# Patient Record
Sex: MALE | Race: WHITE | NOT HISPANIC OR LATINO
[De-identification: names, ages, dates, MRNs, and addresses within clinical notes are randomized per-mention and may not be internally consistent; named-entity substitution may affect disease eponyms.]

---

## 2022-01-01 ENCOUNTER — RESULT REVIEW (OUTPATIENT)
Age: 87
End: 2022-01-01

## 2022-01-01 ENCOUNTER — APPOINTMENT (OUTPATIENT)
Dept: HEMATOLOGY ONCOLOGY | Facility: CLINIC | Age: 87
End: 2022-01-01

## 2022-01-01 ENCOUNTER — OUTPATIENT (OUTPATIENT)
Dept: OUTPATIENT SERVICES | Facility: HOSPITAL | Age: 87
LOS: 1 days | Discharge: HOME | End: 2022-01-01
Payer: MEDICARE

## 2022-01-01 ENCOUNTER — TRANSCRIPTION ENCOUNTER (OUTPATIENT)
Age: 87
End: 2022-01-01

## 2022-01-01 ENCOUNTER — NON-APPOINTMENT (OUTPATIENT)
Age: 87
End: 2022-01-01

## 2022-01-01 ENCOUNTER — APPOINTMENT (OUTPATIENT)
Dept: CARDIOLOGY | Facility: CLINIC | Age: 87
End: 2022-01-01

## 2022-01-01 ENCOUNTER — APPOINTMENT (OUTPATIENT)
Age: 87
End: 2022-01-01
Payer: MEDICARE

## 2022-01-01 ENCOUNTER — APPOINTMENT (OUTPATIENT)
Age: 87
End: 2022-01-01

## 2022-01-01 ENCOUNTER — INPATIENT (INPATIENT)
Facility: HOSPITAL | Age: 87
LOS: 8 days | End: 2022-10-01
Attending: INTERNAL MEDICINE | Admitting: INTERNAL MEDICINE

## 2022-01-01 ENCOUNTER — APPOINTMENT (OUTPATIENT)
Dept: VASCULAR SURGERY | Facility: CLINIC | Age: 87
End: 2022-01-01
Payer: MEDICARE

## 2022-01-01 ENCOUNTER — OUTPATIENT (OUTPATIENT)
Dept: OUTPATIENT SERVICES | Facility: HOSPITAL | Age: 87
LOS: 1 days | Discharge: HOME | End: 2022-01-01

## 2022-01-01 ENCOUNTER — LABORATORY RESULT (OUTPATIENT)
Age: 87
End: 2022-01-01

## 2022-01-01 ENCOUNTER — APPOINTMENT (OUTPATIENT)
Dept: RADIATION ONCOLOGY | Facility: HOSPITAL | Age: 87
End: 2022-01-01

## 2022-01-01 ENCOUNTER — INPATIENT (INPATIENT)
Facility: HOSPITAL | Age: 87
LOS: 4 days | Discharge: HOME HEALTH | End: 2022-05-07
Attending: INTERNAL MEDICINE | Admitting: INTERNAL MEDICINE
Payer: MEDICARE

## 2022-01-01 ENCOUNTER — INPATIENT (INPATIENT)
Facility: HOSPITAL | Age: 87
LOS: 2 days | Discharge: HOME | End: 2022-06-23
Attending: STUDENT IN AN ORGANIZED HEALTH CARE EDUCATION/TRAINING PROGRAM | Admitting: STUDENT IN AN ORGANIZED HEALTH CARE EDUCATION/TRAINING PROGRAM
Payer: MEDICARE

## 2022-01-01 ENCOUNTER — OUTPATIENT (OUTPATIENT)
Dept: OUTPATIENT SERVICES | Facility: HOSPITAL | Age: 87
LOS: 1 days | End: 2022-01-01

## 2022-01-01 ENCOUNTER — EMERGENCY (EMERGENCY)
Facility: HOSPITAL | Age: 87
LOS: 0 days | Discharge: HOME | End: 2022-06-14
Attending: EMERGENCY MEDICINE | Admitting: EMERGENCY MEDICINE
Payer: MEDICARE

## 2022-01-01 VITALS — SYSTOLIC BLOOD PRESSURE: 88 MMHG | DIASTOLIC BLOOD PRESSURE: 54 MMHG

## 2022-01-01 VITALS
DIASTOLIC BLOOD PRESSURE: 57 MMHG | RESPIRATION RATE: 26 BRPM | HEIGHT: 67 IN | HEART RATE: 127 BPM | OXYGEN SATURATION: 97 % | TEMPERATURE: 98 F | SYSTOLIC BLOOD PRESSURE: 101 MMHG

## 2022-01-01 VITALS
RESPIRATION RATE: 16 BRPM | SYSTOLIC BLOOD PRESSURE: 97 MMHG | HEART RATE: 110 BPM | WEIGHT: 161.38 LBS | OXYGEN SATURATION: 94 % | DIASTOLIC BLOOD PRESSURE: 69 MMHG | TEMPERATURE: 97.3 F | BODY MASS INDEX: 25.28 KG/M2

## 2022-01-01 VITALS
HEART RATE: 96 BPM | RESPIRATION RATE: 20 BRPM | OXYGEN SATURATION: 98 % | SYSTOLIC BLOOD PRESSURE: 127 MMHG | DIASTOLIC BLOOD PRESSURE: 53 MMHG

## 2022-01-01 VITALS
HEART RATE: 107 BPM | SYSTOLIC BLOOD PRESSURE: 120 MMHG | WEIGHT: 155.21 LBS | DIASTOLIC BLOOD PRESSURE: 53 MMHG | TEMPERATURE: 96 F

## 2022-01-01 VITALS
RESPIRATION RATE: 14 BRPM | BODY MASS INDEX: 25.11 KG/M2 | SYSTOLIC BLOOD PRESSURE: 116 MMHG | WEIGHT: 160 LBS | HEIGHT: 67 IN | HEART RATE: 64 BPM | DIASTOLIC BLOOD PRESSURE: 80 MMHG | OXYGEN SATURATION: 92 %

## 2022-01-01 VITALS
OXYGEN SATURATION: 97 % | SYSTOLIC BLOOD PRESSURE: 120 MMHG | HEIGHT: 67 IN | HEART RATE: 80 BPM | BODY MASS INDEX: 26.21 KG/M2 | DIASTOLIC BLOOD PRESSURE: 80 MMHG | WEIGHT: 167 LBS | RESPIRATION RATE: 14 BRPM

## 2022-01-01 VITALS
HEART RATE: 85 BPM | WEIGHT: 160.06 LBS | TEMPERATURE: 100 F | RESPIRATION RATE: 20 BRPM | DIASTOLIC BLOOD PRESSURE: 75 MMHG | SYSTOLIC BLOOD PRESSURE: 158 MMHG

## 2022-01-01 VITALS
WEIGHT: 168 LBS | BODY MASS INDEX: 32.98 KG/M2 | HEART RATE: 88 BPM | DIASTOLIC BLOOD PRESSURE: 62 MMHG | SYSTOLIC BLOOD PRESSURE: 102 MMHG | RESPIRATION RATE: 14 BRPM | HEIGHT: 60 IN | OXYGEN SATURATION: 98 %

## 2022-01-01 VITALS
HEART RATE: 72 BPM | BODY MASS INDEX: 27.31 KG/M2 | HEIGHT: 67 IN | SYSTOLIC BLOOD PRESSURE: 118 MMHG | WEIGHT: 174 LBS | TEMPERATURE: 97.6 F | DIASTOLIC BLOOD PRESSURE: 68 MMHG

## 2022-01-01 VITALS
RESPIRATION RATE: 12 BRPM | DIASTOLIC BLOOD PRESSURE: 60 MMHG | SYSTOLIC BLOOD PRESSURE: 100 MMHG | WEIGHT: 170 LBS | OXYGEN SATURATION: 97 % | HEART RATE: 79 BPM

## 2022-01-01 VITALS
DIASTOLIC BLOOD PRESSURE: 67 MMHG | OXYGEN SATURATION: 97 % | TEMPERATURE: 98 F | HEART RATE: 89 BPM | SYSTOLIC BLOOD PRESSURE: 105 MMHG | WEIGHT: 164.91 LBS | HEIGHT: 67 IN | RESPIRATION RATE: 15 BRPM

## 2022-01-01 VITALS
HEART RATE: 107 BPM | WEIGHT: 164 LBS | BODY MASS INDEX: 25.69 KG/M2 | SYSTOLIC BLOOD PRESSURE: 99 MMHG | RESPIRATION RATE: 16 BRPM | OXYGEN SATURATION: 95 % | DIASTOLIC BLOOD PRESSURE: 55 MMHG | TEMPERATURE: 97 F

## 2022-01-01 VITALS
WEIGHT: 161 LBS | HEART RATE: 100 BPM | OXYGEN SATURATION: 95 % | DIASTOLIC BLOOD PRESSURE: 56 MMHG | TEMPERATURE: 96.6 F | RESPIRATION RATE: 16 BRPM | SYSTOLIC BLOOD PRESSURE: 116 MMHG | BODY MASS INDEX: 25.22 KG/M2

## 2022-01-01 VITALS
HEART RATE: 101 BPM | RESPIRATION RATE: 20 BRPM | SYSTOLIC BLOOD PRESSURE: 104 MMHG | DIASTOLIC BLOOD PRESSURE: 63 MMHG | WEIGHT: 160.06 LBS | TEMPERATURE: 97 F | HEIGHT: 67 IN

## 2022-01-01 VITALS
RESPIRATION RATE: 14 BRPM | HEIGHT: 67 IN | WEIGHT: 161 LBS | DIASTOLIC BLOOD PRESSURE: 80 MMHG | HEART RATE: 85 BPM | OXYGEN SATURATION: 90 % | BODY MASS INDEX: 25.27 KG/M2 | SYSTOLIC BLOOD PRESSURE: 130 MMHG

## 2022-01-01 VITALS
RESPIRATION RATE: 20 BRPM | HEART RATE: 78 BPM | SYSTOLIC BLOOD PRESSURE: 104 MMHG | DIASTOLIC BLOOD PRESSURE: 53 MMHG | OXYGEN SATURATION: 98 % | TEMPERATURE: 97 F | HEIGHT: 67 IN

## 2022-01-01 VITALS
TEMPERATURE: 98 F | RESPIRATION RATE: 18 BRPM | HEART RATE: 93 BPM | DIASTOLIC BLOOD PRESSURE: 65 MMHG | SYSTOLIC BLOOD PRESSURE: 146 MMHG | OXYGEN SATURATION: 99 %

## 2022-01-01 VITALS
DIASTOLIC BLOOD PRESSURE: 60 MMHG | BODY MASS INDEX: 27.31 KG/M2 | OXYGEN SATURATION: 95 % | SYSTOLIC BLOOD PRESSURE: 90 MMHG | WEIGHT: 174 LBS | HEART RATE: 78 BPM | TEMPERATURE: 98.2 F | HEIGHT: 67 IN

## 2022-01-01 VITALS — HEART RATE: 115 BPM

## 2022-01-01 VITALS — TEMPERATURE: 98 F

## 2022-01-01 VITALS — OXYGEN SATURATION: 98 %

## 2022-01-01 VITALS — BODY MASS INDEX: 26.68 KG/M2 | WEIGHT: 170 LBS | HEIGHT: 67 IN

## 2022-01-01 VITALS — DIASTOLIC BLOOD PRESSURE: 55 MMHG | SYSTOLIC BLOOD PRESSURE: 82 MMHG

## 2022-01-01 DIAGNOSIS — Z02.9 ENCOUNTER FOR ADMINISTRATIVE EXAMINATIONS, UNSPECIFIED: ICD-10-CM

## 2022-01-01 DIAGNOSIS — I73.9 PERIPHERAL VASCULAR DISEASE, UNSPECIFIED: ICD-10-CM

## 2022-01-01 DIAGNOSIS — Z87.891 PERSONAL HISTORY OF NICOTINE DEPENDENCE: ICD-10-CM

## 2022-01-01 DIAGNOSIS — Z80.1 FAMILY HISTORY OF MALIGNANT NEOPLASM OF TRACHEA, BRONCHUS AND LUNG: ICD-10-CM

## 2022-01-01 DIAGNOSIS — E78.00 PURE HYPERCHOLESTEROLEMIA, UNSPECIFIED: ICD-10-CM

## 2022-01-01 DIAGNOSIS — Z98.890 OTHER SPECIFIED POSTPROCEDURAL STATES: Chronic | ICD-10-CM

## 2022-01-01 DIAGNOSIS — Z98.49 CATARACT EXTRACTION STATUS, UNSPECIFIED EYE: Chronic | ICD-10-CM

## 2022-01-01 DIAGNOSIS — Z09 ENCOUNTER FOR FOLLOW-UP EXAMINATION AFTER COMPLETED TREATMENT FOR CONDITIONS OTHER THAN MALIGNANT NEOPLASM: ICD-10-CM

## 2022-01-01 DIAGNOSIS — R73.03 PREDIABETES: ICD-10-CM

## 2022-01-01 DIAGNOSIS — R91.8 OTHER NONSPECIFIC ABNORMAL FINDING OF LUNG FIELD: ICD-10-CM

## 2022-01-01 DIAGNOSIS — E78.5 HYPERLIPIDEMIA, UNSPECIFIED: ICD-10-CM

## 2022-01-01 DIAGNOSIS — I10 ESSENTIAL (PRIMARY) HYPERTENSION: ICD-10-CM

## 2022-01-01 DIAGNOSIS — R42 DIZZINESS AND GIDDINESS: ICD-10-CM

## 2022-01-01 DIAGNOSIS — I48.91 UNSPECIFIED ATRIAL FIBRILLATION: ICD-10-CM

## 2022-01-01 DIAGNOSIS — I48.92 UNSPECIFIED ATRIAL FLUTTER: ICD-10-CM

## 2022-01-01 DIAGNOSIS — C34.92 MALIGNANT NEOPLASM OF UNSPECIFIED PART OF LEFT BRONCHUS OR LUNG: ICD-10-CM

## 2022-01-01 DIAGNOSIS — J98.11 ATELECTASIS: ICD-10-CM

## 2022-01-01 DIAGNOSIS — Z79.82 LONG TERM (CURRENT) USE OF ASPIRIN: ICD-10-CM

## 2022-01-01 DIAGNOSIS — Z01.818 ENCOUNTER FOR OTHER PREPROCEDURAL EXAMINATION: ICD-10-CM

## 2022-01-01 DIAGNOSIS — J98.4 OTHER DISORDERS OF LUNG: ICD-10-CM

## 2022-01-01 DIAGNOSIS — Z79.01 LONG TERM (CURRENT) USE OF ANTICOAGULANTS: ICD-10-CM

## 2022-01-01 DIAGNOSIS — I48.0 PAROXYSMAL ATRIAL FIBRILLATION: ICD-10-CM

## 2022-01-01 DIAGNOSIS — E44.0 MODERATE PROTEIN-CALORIE MALNUTRITION: ICD-10-CM

## 2022-01-01 DIAGNOSIS — J90 PLEURAL EFFUSION, NOT ELSEWHERE CLASSIFIED: ICD-10-CM

## 2022-01-01 DIAGNOSIS — I65.23 OCCLUSION AND STENOSIS OF BILATERAL CAROTID ARTERIES: ICD-10-CM

## 2022-01-01 DIAGNOSIS — R94.31 ABNORMAL ELECTROCARDIOGRAM [ECG] [EKG]: ICD-10-CM

## 2022-01-01 DIAGNOSIS — J96.01 ACUTE RESPIRATORY FAILURE WITH HYPOXIA: ICD-10-CM

## 2022-01-01 DIAGNOSIS — E87.70 FLUID OVERLOAD, UNSPECIFIED: ICD-10-CM

## 2022-01-01 DIAGNOSIS — Z80.2 FAMILY HISTORY OF MALIGNANT NEOPLASM OF OTHER RESPIRATORY AND INTRATHORACIC ORGANS: ICD-10-CM

## 2022-01-01 DIAGNOSIS — E87.1 HYPO-OSMOLALITY AND HYPONATREMIA: ICD-10-CM

## 2022-01-01 DIAGNOSIS — M79.89 OTHER SPECIFIED SOFT TISSUE DISORDERS: ICD-10-CM

## 2022-01-01 LAB
A1C WITH ESTIMATED AVERAGE GLUCOSE RESULT: 5.6 % — SIGNIFICANT CHANGE UP (ref 4–5.6)
A1C WITH ESTIMATED AVERAGE GLUCOSE RESULT: 5.7 % — HIGH (ref 4–5.6)
ALBUMIN FLD-MCNC: 2 G/DL — SIGNIFICANT CHANGE UP
ALBUMIN SERPL ELPH-MCNC: 2.2 G/DL — LOW (ref 3.5–5.2)
ALBUMIN SERPL ELPH-MCNC: 2.4 G/DL — LOW (ref 3.5–5.2)
ALBUMIN SERPL ELPH-MCNC: 2.5 G/DL — LOW (ref 3.5–5.2)
ALBUMIN SERPL ELPH-MCNC: 2.6 G/DL — LOW (ref 3.5–5.2)
ALBUMIN SERPL ELPH-MCNC: 2.7 G/DL — LOW (ref 3.5–5.2)
ALBUMIN SERPL ELPH-MCNC: 2.9 G/DL — LOW (ref 3.5–5.2)
ALBUMIN SERPL ELPH-MCNC: 3.2 G/DL — LOW (ref 3.5–5.2)
ALBUMIN SERPL ELPH-MCNC: 3.4 G/DL — LOW (ref 3.5–5.2)
ALBUMIN SERPL ELPH-MCNC: 3.6 G/DL — SIGNIFICANT CHANGE UP (ref 3.5–5.2)
ALP SERPL-CCNC: 102 U/L — SIGNIFICANT CHANGE UP (ref 30–115)
ALP SERPL-CCNC: 104 U/L — SIGNIFICANT CHANGE UP (ref 30–115)
ALP SERPL-CCNC: 75 U/L — SIGNIFICANT CHANGE UP (ref 30–115)
ALP SERPL-CCNC: 76 U/L — SIGNIFICANT CHANGE UP (ref 30–115)
ALP SERPL-CCNC: 80 U/L — SIGNIFICANT CHANGE UP (ref 30–115)
ALP SERPL-CCNC: 80 U/L — SIGNIFICANT CHANGE UP (ref 30–115)
ALP SERPL-CCNC: 83 U/L — SIGNIFICANT CHANGE UP (ref 30–115)
ALP SERPL-CCNC: 84 U/L — SIGNIFICANT CHANGE UP (ref 30–115)
ALP SERPL-CCNC: 84 U/L — SIGNIFICANT CHANGE UP (ref 30–115)
ALP SERPL-CCNC: 85 U/L — SIGNIFICANT CHANGE UP (ref 30–115)
ALP SERPL-CCNC: 85 U/L — SIGNIFICANT CHANGE UP (ref 30–115)
ALP SERPL-CCNC: 86 U/L — SIGNIFICANT CHANGE UP (ref 30–115)
ALP SERPL-CCNC: 87 U/L — SIGNIFICANT CHANGE UP (ref 30–115)
ALP SERPL-CCNC: 90 U/L — SIGNIFICANT CHANGE UP (ref 30–115)
ALP SERPL-CCNC: 94 U/L — SIGNIFICANT CHANGE UP (ref 30–115)
ALP SERPL-CCNC: 99 U/L — SIGNIFICANT CHANGE UP (ref 30–115)
ALT FLD-CCNC: 13 U/L — SIGNIFICANT CHANGE UP (ref 0–41)
ALT FLD-CCNC: 15 U/L — SIGNIFICANT CHANGE UP (ref 0–41)
ALT FLD-CCNC: 15 U/L — SIGNIFICANT CHANGE UP (ref 0–41)
ALT FLD-CCNC: 16 U/L — SIGNIFICANT CHANGE UP (ref 0–41)
ALT FLD-CCNC: 17 U/L — SIGNIFICANT CHANGE UP (ref 0–41)
ALT FLD-CCNC: 27 U/L — SIGNIFICANT CHANGE UP (ref 0–41)
ALT FLD-CCNC: 27 U/L — SIGNIFICANT CHANGE UP (ref 0–41)
ALT FLD-CCNC: 29 U/L — SIGNIFICANT CHANGE UP (ref 0–41)
ALT FLD-CCNC: 31 U/L — SIGNIFICANT CHANGE UP (ref 0–41)
ALT FLD-CCNC: 32 U/L — SIGNIFICANT CHANGE UP (ref 0–41)
ALT FLD-CCNC: 33 U/L — SIGNIFICANT CHANGE UP (ref 0–41)
ALT FLD-CCNC: 35 U/L — SIGNIFICANT CHANGE UP (ref 0–41)
ALT FLD-CCNC: 36 U/L — SIGNIFICANT CHANGE UP (ref 0–41)
ALT FLD-CCNC: 52 U/L — HIGH (ref 0–41)
ANION GAP SERPL CALC-SCNC: 10 MMOL/L — SIGNIFICANT CHANGE UP (ref 7–14)
ANION GAP SERPL CALC-SCNC: 11 MMOL/L — SIGNIFICANT CHANGE UP (ref 7–14)
ANION GAP SERPL CALC-SCNC: 12 MMOL/L — SIGNIFICANT CHANGE UP (ref 7–14)
ANION GAP SERPL CALC-SCNC: 14 MMOL/L — SIGNIFICANT CHANGE UP (ref 7–14)
ANION GAP SERPL CALC-SCNC: 15 MMOL/L — HIGH (ref 7–14)
ANION GAP SERPL CALC-SCNC: 18 MMOL/L — HIGH (ref 7–14)
ANION GAP SERPL CALC-SCNC: 2 MMOL/L — LOW (ref 7–14)
ANION GAP SERPL CALC-SCNC: 4 MMOL/L — LOW (ref 7–14)
ANION GAP SERPL CALC-SCNC: 6 MMOL/L — LOW (ref 7–14)
ANION GAP SERPL CALC-SCNC: 6 MMOL/L — LOW (ref 7–14)
ANION GAP SERPL CALC-SCNC: 7 MMOL/L — SIGNIFICANT CHANGE UP (ref 7–14)
ANION GAP SERPL CALC-SCNC: 8 MMOL/L — SIGNIFICANT CHANGE UP (ref 7–14)
ANION GAP SERPL CALC-SCNC: 9 MMOL/L — SIGNIFICANT CHANGE UP (ref 7–14)
ANION GAP SERPL CALC-SCNC: 9 MMOL/L — SIGNIFICANT CHANGE UP (ref 7–14)
APTT BLD: 32.1 SEC — SIGNIFICANT CHANGE UP (ref 27–39.2)
APTT BLD: 44.6 SEC — HIGH (ref 27–39.2)
AST SERPL-CCNC: 16 U/L — SIGNIFICANT CHANGE UP (ref 0–41)
AST SERPL-CCNC: 20 U/L — SIGNIFICANT CHANGE UP (ref 0–41)
AST SERPL-CCNC: 20 U/L — SIGNIFICANT CHANGE UP (ref 0–41)
AST SERPL-CCNC: 21 U/L — SIGNIFICANT CHANGE UP (ref 0–41)
AST SERPL-CCNC: 22 U/L — SIGNIFICANT CHANGE UP (ref 0–41)
AST SERPL-CCNC: 24 U/L — SIGNIFICANT CHANGE UP (ref 0–41)
AST SERPL-CCNC: 33 U/L — SIGNIFICANT CHANGE UP (ref 0–41)
AST SERPL-CCNC: 33 U/L — SIGNIFICANT CHANGE UP (ref 0–41)
AST SERPL-CCNC: 38 U/L — SIGNIFICANT CHANGE UP (ref 0–41)
AST SERPL-CCNC: 38 U/L — SIGNIFICANT CHANGE UP (ref 0–41)
AST SERPL-CCNC: 41 U/L — SIGNIFICANT CHANGE UP (ref 0–41)
AST SERPL-CCNC: 42 U/L — HIGH (ref 0–41)
AST SERPL-CCNC: 43 U/L — HIGH (ref 0–41)
AST SERPL-CCNC: 49 U/L — HIGH (ref 0–41)
AST SERPL-CCNC: 51 U/L — HIGH (ref 0–41)
AST SERPL-CCNC: 56 U/L — HIGH (ref 0–41)
B PERT IGG+IGM PNL SER: ABNORMAL
BASE EXCESS BLDA CALC-SCNC: 24.5 MMOL/L — HIGH (ref -2–3)
BASE EXCESS BLDA CALC-SCNC: 24.9 MMOL/L — HIGH (ref -2–3)
BASE EXCESS BLDV CALC-SCNC: 29.9 MMOL/L — HIGH (ref -2–3)
BASOPHILS # BLD AUTO: 0 K/UL — SIGNIFICANT CHANGE UP (ref 0–0.2)
BASOPHILS # BLD AUTO: 0.01 K/UL — SIGNIFICANT CHANGE UP (ref 0–0.2)
BASOPHILS # BLD AUTO: 0.02 K/UL — SIGNIFICANT CHANGE UP (ref 0–0.2)
BASOPHILS # BLD AUTO: 0.02 K/UL — SIGNIFICANT CHANGE UP (ref 0–0.2)
BASOPHILS # BLD AUTO: 0.03 K/UL — SIGNIFICANT CHANGE UP (ref 0–0.2)
BASOPHILS # BLD AUTO: 0.03 K/UL — SIGNIFICANT CHANGE UP (ref 0–0.2)
BASOPHILS # BLD AUTO: 0.04 K/UL — SIGNIFICANT CHANGE UP (ref 0–0.2)
BASOPHILS # BLD AUTO: 0.06 K/UL — SIGNIFICANT CHANGE UP (ref 0–0.2)
BASOPHILS # BLD AUTO: 0.06 K/UL — SIGNIFICANT CHANGE UP (ref 0–0.2)
BASOPHILS NFR BLD AUTO: 0 % — SIGNIFICANT CHANGE UP (ref 0–1)
BASOPHILS NFR BLD AUTO: 0.1 % — SIGNIFICANT CHANGE UP (ref 0–1)
BASOPHILS NFR BLD AUTO: 0.2 % — SIGNIFICANT CHANGE UP (ref 0–1)
BASOPHILS NFR BLD AUTO: 0.4 % — SIGNIFICANT CHANGE UP (ref 0–1)
BASOPHILS NFR BLD AUTO: 0.4 % — SIGNIFICANT CHANGE UP (ref 0–1)
BASOPHILS NFR BLD AUTO: 0.5 % — SIGNIFICANT CHANGE UP (ref 0–1)
BASOPHILS NFR BLD AUTO: 0.5 % — SIGNIFICANT CHANGE UP (ref 0–1)
BASOPHILS NFR BLD AUTO: 0.6 % — SIGNIFICANT CHANGE UP (ref 0–1)
BASOPHILS NFR BLD AUTO: 0.8 % — SIGNIFICANT CHANGE UP (ref 0–1)
BASOPHILS NFR BLD AUTO: 1 % — SIGNIFICANT CHANGE UP (ref 0–1)
BILIRUB SERPL-MCNC: 0.3 MG/DL — SIGNIFICANT CHANGE UP (ref 0.2–1.2)
BILIRUB SERPL-MCNC: 0.4 MG/DL — SIGNIFICANT CHANGE UP (ref 0.2–1.2)
BILIRUB SERPL-MCNC: 0.5 MG/DL — SIGNIFICANT CHANGE UP (ref 0.2–1.2)
BILIRUB SERPL-MCNC: 0.6 MG/DL — SIGNIFICANT CHANGE UP (ref 0.2–1.2)
BILIRUB SERPL-MCNC: 0.8 MG/DL — SIGNIFICANT CHANGE UP (ref 0.2–1.2)
BLD GP AB SCN SERPL QL: SIGNIFICANT CHANGE UP
BUN SERPL-MCNC: 10 MG/DL — SIGNIFICANT CHANGE UP (ref 10–20)
BUN SERPL-MCNC: 12 MG/DL — SIGNIFICANT CHANGE UP (ref 10–20)
BUN SERPL-MCNC: 12 MG/DL — SIGNIFICANT CHANGE UP (ref 10–20)
BUN SERPL-MCNC: 13 MG/DL — SIGNIFICANT CHANGE UP (ref 10–20)
BUN SERPL-MCNC: 13 MG/DL — SIGNIFICANT CHANGE UP (ref 10–20)
BUN SERPL-MCNC: 14 MG/DL — SIGNIFICANT CHANGE UP (ref 10–20)
BUN SERPL-MCNC: 16 MG/DL — SIGNIFICANT CHANGE UP (ref 10–20)
BUN SERPL-MCNC: 17 MG/DL — SIGNIFICANT CHANGE UP (ref 10–20)
BUN SERPL-MCNC: 19 MG/DL — SIGNIFICANT CHANGE UP (ref 10–20)
BUN SERPL-MCNC: 20 MG/DL — SIGNIFICANT CHANGE UP (ref 10–20)
BUN SERPL-MCNC: 21 MG/DL — HIGH (ref 10–20)
BUN SERPL-MCNC: 23 MG/DL — HIGH (ref 10–20)
BUN SERPL-MCNC: 25 MG/DL — HIGH (ref 10–20)
BUN SERPL-MCNC: 26 MG/DL — HIGH (ref 10–20)
BUN SERPL-MCNC: 35 MG/DL — HIGH (ref 10–20)
BUN SERPL-MCNC: 42 MG/DL — HIGH (ref 10–20)
BUN SERPL-MCNC: 43 MG/DL — HIGH (ref 10–20)
BUN SERPL-MCNC: 44 MG/DL — HIGH (ref 10–20)
BUN SERPL-MCNC: 50 MG/DL — HIGH (ref 10–20)
BUN SERPL-MCNC: 8 MG/DL — LOW (ref 10–20)
BUN SERPL-MCNC: 9 MG/DL — LOW (ref 10–20)
CA-I SERPL-SCNC: 1.09 MMOL/L — LOW (ref 1.15–1.33)
CALCIUM SERPL-MCNC: 8 MG/DL — LOW (ref 8.4–10.5)
CALCIUM SERPL-MCNC: 8.3 MG/DL — LOW (ref 8.4–10.5)
CALCIUM SERPL-MCNC: 8.4 MG/DL — LOW (ref 8.5–10.1)
CALCIUM SERPL-MCNC: 8.4 MG/DL — SIGNIFICANT CHANGE UP (ref 8.4–10.5)
CALCIUM SERPL-MCNC: 8.6 MG/DL — SIGNIFICANT CHANGE UP (ref 8.4–10.5)
CALCIUM SERPL-MCNC: 8.6 MG/DL — SIGNIFICANT CHANGE UP (ref 8.5–10.1)
CALCIUM SERPL-MCNC: 8.7 MG/DL — SIGNIFICANT CHANGE UP (ref 8.4–10.5)
CALCIUM SERPL-MCNC: 8.7 MG/DL — SIGNIFICANT CHANGE UP (ref 8.5–10.1)
CALCIUM SERPL-MCNC: 8.8 MG/DL — SIGNIFICANT CHANGE UP (ref 8.5–10.1)
CALCIUM SERPL-MCNC: 8.9 MG/DL — SIGNIFICANT CHANGE UP (ref 8.5–10.1)
CALCIUM SERPL-MCNC: 8.9 MG/DL — SIGNIFICANT CHANGE UP (ref 8.5–10.1)
CALCIUM SERPL-MCNC: 9.3 MG/DL — SIGNIFICANT CHANGE UP (ref 8.5–10.1)
CALCIUM SERPL-MCNC: 9.4 MG/DL — SIGNIFICANT CHANGE UP (ref 8.5–10.1)
CALCIUM SERPL-MCNC: 9.4 MG/DL — SIGNIFICANT CHANGE UP (ref 8.5–10.1)
CHLORIDE SERPL-SCNC: 100 MMOL/L — SIGNIFICANT CHANGE UP (ref 98–110)
CHLORIDE SERPL-SCNC: 101 MMOL/L — SIGNIFICANT CHANGE UP (ref 98–110)
CHLORIDE SERPL-SCNC: 104 MMOL/L — SIGNIFICANT CHANGE UP (ref 98–110)
CHLORIDE SERPL-SCNC: 82 MMOL/L — LOW (ref 98–110)
CHLORIDE SERPL-SCNC: 85 MMOL/L — LOW (ref 98–110)
CHLORIDE SERPL-SCNC: 86 MMOL/L — LOW (ref 98–110)
CHLORIDE SERPL-SCNC: 86 MMOL/L — LOW (ref 98–110)
CHLORIDE SERPL-SCNC: 87 MMOL/L — LOW (ref 98–110)
CHLORIDE SERPL-SCNC: 88 MMOL/L — LOW (ref 98–110)
CHLORIDE SERPL-SCNC: 90 MMOL/L — LOW (ref 98–110)
CHLORIDE SERPL-SCNC: 90 MMOL/L — LOW (ref 98–110)
CHLORIDE SERPL-SCNC: 91 MMOL/L — LOW (ref 98–110)
CHLORIDE SERPL-SCNC: 94 MMOL/L — LOW (ref 98–110)
CHLORIDE SERPL-SCNC: 96 MMOL/L — LOW (ref 98–110)
CHLORIDE SERPL-SCNC: 97 MMOL/L — LOW (ref 98–110)
CHLORIDE SERPL-SCNC: 98 MMOL/L — SIGNIFICANT CHANGE UP (ref 98–110)
CHLORIDE SERPL-SCNC: 99 MMOL/L — SIGNIFICANT CHANGE UP (ref 98–110)
CHLORIDE SERPL-SCNC: 99 MMOL/L — SIGNIFICANT CHANGE UP (ref 98–110)
CHOLEST SERPL-MCNC: 108 MG/DL — SIGNIFICANT CHANGE UP
CHOLEST SERPL-MCNC: 120 MG/DL — SIGNIFICANT CHANGE UP
CHOLEST SERPL-MCNC: 126 MG/DL — SIGNIFICANT CHANGE UP
CO2 SERPL-SCNC: 18 MMOL/L — SIGNIFICANT CHANGE UP (ref 17–32)
CO2 SERPL-SCNC: 19 MMOL/L — SIGNIFICANT CHANGE UP (ref 17–32)
CO2 SERPL-SCNC: 24 MMOL/L — SIGNIFICANT CHANGE UP (ref 17–32)
CO2 SERPL-SCNC: 25 MMOL/L — SIGNIFICANT CHANGE UP (ref 17–32)
CO2 SERPL-SCNC: 26 MMOL/L — SIGNIFICANT CHANGE UP (ref 17–32)
CO2 SERPL-SCNC: 26 MMOL/L — SIGNIFICANT CHANGE UP (ref 17–32)
CO2 SERPL-SCNC: 27 MMOL/L — SIGNIFICANT CHANGE UP (ref 17–32)
CO2 SERPL-SCNC: 28 MMOL/L — SIGNIFICANT CHANGE UP (ref 17–32)
CO2 SERPL-SCNC: 28 MMOL/L — SIGNIFICANT CHANGE UP (ref 17–32)
CO2 SERPL-SCNC: 37 MMOL/L — HIGH (ref 17–32)
CO2 SERPL-SCNC: 40 MMOL/L — HIGH (ref 17–32)
CO2 SERPL-SCNC: 43 MMOL/L — CRITICAL HIGH (ref 17–32)
CO2 SERPL-SCNC: 44 MMOL/L — CRITICAL HIGH (ref 17–32)
CO2 SERPL-SCNC: 45 MMOL/L — CRITICAL HIGH (ref 17–32)
CO2 SERPL-SCNC: 45 MMOL/L — CRITICAL HIGH (ref 17–32)
CO2 SERPL-SCNC: 47 MMOL/L — CRITICAL HIGH (ref 17–32)
CO2 SERPL-SCNC: 49 MMOL/L — CRITICAL HIGH (ref 17–32)
CO2 SERPL-SCNC: 50 MMOL/L — CRITICAL HIGH (ref 17–32)
COLOR FLD: SIGNIFICANT CHANGE UP
CREAT SERPL-MCNC: 0.6 MG/DL — LOW (ref 0.7–1.5)
CREAT SERPL-MCNC: 0.7 MG/DL — SIGNIFICANT CHANGE UP (ref 0.7–1.5)
CREAT SERPL-MCNC: 0.8 MG/DL — SIGNIFICANT CHANGE UP (ref 0.7–1.5)
CREAT SERPL-MCNC: 0.9 MG/DL — SIGNIFICANT CHANGE UP (ref 0.7–1.5)
CREAT SERPL-MCNC: 1 MG/DL — SIGNIFICANT CHANGE UP (ref 0.7–1.5)
CREAT SERPL-MCNC: 1 MG/DL — SIGNIFICANT CHANGE UP (ref 0.7–1.5)
CRP SERPL-MCNC: 90.8 MG/L — HIGH
CULTURE RESULTS: SIGNIFICANT CHANGE UP
D DIMER BLD IA.RAPID-MCNC: 318 NG/ML DDU — HIGH (ref 0–230)
D DIMER BLD IA.RAPID-MCNC: 452 NG/ML DDU — HIGH (ref 0–230)
D DIMER BLD IA.RAPID-MCNC: 622 NG/ML DDU — HIGH (ref 0–230)
DIGOXIN SERPL-MCNC: 1 NG/ML — SIGNIFICANT CHANGE UP (ref 0.8–2)
EGFR: 73 ML/MIN/1.73M2 — SIGNIFICANT CHANGE UP
EGFR: 73 ML/MIN/1.73M2 — SIGNIFICANT CHANGE UP
EGFR: 83 ML/MIN/1.73M2 — SIGNIFICANT CHANGE UP
EGFR: 86 ML/MIN/1.73M2 — SIGNIFICANT CHANGE UP
EGFR: 89 ML/MIN/1.73M2 — SIGNIFICANT CHANGE UP
EGFR: 90 ML/MIN/1.73M2 — SIGNIFICANT CHANGE UP
EGFR: 93 ML/MIN/1.73M2 — SIGNIFICANT CHANGE UP
EOSINOPHIL # BLD AUTO: 0 K/UL — SIGNIFICANT CHANGE UP (ref 0–0.7)
EOSINOPHIL # BLD AUTO: 0.02 K/UL — SIGNIFICANT CHANGE UP (ref 0–0.7)
EOSINOPHIL # BLD AUTO: 0.05 K/UL — SIGNIFICANT CHANGE UP (ref 0–0.7)
EOSINOPHIL # BLD AUTO: 0.05 K/UL — SIGNIFICANT CHANGE UP (ref 0–0.7)
EOSINOPHIL # BLD AUTO: 0.06 K/UL — SIGNIFICANT CHANGE UP (ref 0–0.7)
EOSINOPHIL # BLD AUTO: 0.08 K/UL — SIGNIFICANT CHANGE UP (ref 0–0.7)
EOSINOPHIL # BLD AUTO: 0.09 K/UL — SIGNIFICANT CHANGE UP (ref 0–0.7)
EOSINOPHIL # BLD AUTO: 0.09 K/UL — SIGNIFICANT CHANGE UP (ref 0–0.7)
EOSINOPHIL # BLD AUTO: 0.12 K/UL — SIGNIFICANT CHANGE UP (ref 0–0.7)
EOSINOPHIL # BLD AUTO: 0.12 K/UL — SIGNIFICANT CHANGE UP (ref 0–0.7)
EOSINOPHIL # BLD AUTO: 0.14 K/UL — SIGNIFICANT CHANGE UP (ref 0–0.7)
EOSINOPHIL NFR BLD AUTO: 0 % — SIGNIFICANT CHANGE UP (ref 0–8)
EOSINOPHIL NFR BLD AUTO: 0.2 % — SIGNIFICANT CHANGE UP (ref 0–8)
EOSINOPHIL NFR BLD AUTO: 0.7 % — SIGNIFICANT CHANGE UP (ref 0–8)
EOSINOPHIL NFR BLD AUTO: 0.8 % — SIGNIFICANT CHANGE UP (ref 0–8)
EOSINOPHIL NFR BLD AUTO: 0.8 % — SIGNIFICANT CHANGE UP (ref 0–8)
EOSINOPHIL NFR BLD AUTO: 1 % — SIGNIFICANT CHANGE UP (ref 0–8)
EOSINOPHIL NFR BLD AUTO: 1.2 % — SIGNIFICANT CHANGE UP (ref 0–8)
EOSINOPHIL NFR BLD AUTO: 1.3 % — SIGNIFICANT CHANGE UP (ref 0–8)
EOSINOPHIL NFR BLD AUTO: 1.8 % — SIGNIFICANT CHANGE UP (ref 0–8)
EOSINOPHIL NFR BLD AUTO: 1.9 % — SIGNIFICANT CHANGE UP (ref 0–8)
EOSINOPHIL NFR BLD AUTO: 2.3 % — SIGNIFICANT CHANGE UP (ref 0–8)
ERYTHROCYTE [SEDIMENTATION RATE] IN BLOOD: 95 MM/HR — HIGH (ref 0–10)
ESTIMATED AVERAGE GLUCOSE: 114 MG/DL — SIGNIFICANT CHANGE UP (ref 68–114)
ESTIMATED AVERAGE GLUCOSE: 117 MG/DL — HIGH (ref 68–114)
FERRITIN SERPL-MCNC: 145 NG/ML — SIGNIFICANT CHANGE UP (ref 30–400)
FERRITIN SERPL-MCNC: 627 NG/ML — HIGH (ref 30–400)
FIBRINOGEN AG PPP IA-MCNC: 690 MG/DL — HIGH (ref 233–496)
FLUAV AG NPH QL: SIGNIFICANT CHANGE UP
FLUBV AG NPH QL: SIGNIFICANT CHANGE UP
FLUID INTAKE SUBSTANCE CLASS: SIGNIFICANT CHANGE UP
GAS PNL BLDA: SIGNIFICANT CHANGE UP
GAS PNL BLDA: SIGNIFICANT CHANGE UP
GAS PNL BLDV: 131 MMOL/L — LOW (ref 136–145)
GAS PNL BLDV: SIGNIFICANT CHANGE UP
GLUCOSE BLDC GLUCOMTR-MCNC: 109 MG/DL — HIGH (ref 70–99)
GLUCOSE BLDC GLUCOMTR-MCNC: 109 MG/DL — HIGH (ref 70–99)
GLUCOSE BLDC GLUCOMTR-MCNC: 112 MG/DL — HIGH (ref 70–99)
GLUCOSE BLDC GLUCOMTR-MCNC: 161 MG/DL — HIGH (ref 70–99)
GLUCOSE BLDC GLUCOMTR-MCNC: 77 MG/DL — SIGNIFICANT CHANGE UP (ref 70–99)
GLUCOSE BLDC GLUCOMTR-MCNC: 95 MG/DL — SIGNIFICANT CHANGE UP (ref 70–99)
GLUCOSE FLD-MCNC: 104 MG/DL — SIGNIFICANT CHANGE UP
GLUCOSE SERPL-MCNC: 100 MG/DL — HIGH (ref 70–99)
GLUCOSE SERPL-MCNC: 100 MG/DL — HIGH (ref 70–99)
GLUCOSE SERPL-MCNC: 102 MG/DL — HIGH (ref 70–99)
GLUCOSE SERPL-MCNC: 102 MG/DL — HIGH (ref 70–99)
GLUCOSE SERPL-MCNC: 103 MG/DL — HIGH (ref 70–99)
GLUCOSE SERPL-MCNC: 105 MG/DL — HIGH (ref 70–99)
GLUCOSE SERPL-MCNC: 112 MG/DL — HIGH (ref 70–99)
GLUCOSE SERPL-MCNC: 113 MG/DL — HIGH (ref 70–99)
GLUCOSE SERPL-MCNC: 116 MG/DL — HIGH (ref 70–99)
GLUCOSE SERPL-MCNC: 133 MG/DL — HIGH (ref 70–99)
GLUCOSE SERPL-MCNC: 139 MG/DL — HIGH (ref 70–99)
GLUCOSE SERPL-MCNC: 142 MG/DL — HIGH (ref 70–99)
GLUCOSE SERPL-MCNC: 143 MG/DL — HIGH (ref 70–99)
GLUCOSE SERPL-MCNC: 92 MG/DL — SIGNIFICANT CHANGE UP (ref 70–99)
GLUCOSE SERPL-MCNC: 94 MG/DL — SIGNIFICANT CHANGE UP (ref 70–99)
GLUCOSE SERPL-MCNC: 95 MG/DL — SIGNIFICANT CHANGE UP (ref 70–99)
GLUCOSE SERPL-MCNC: 95 MG/DL — SIGNIFICANT CHANGE UP (ref 70–99)
GLUCOSE SERPL-MCNC: 96 MG/DL — SIGNIFICANT CHANGE UP (ref 70–99)
GLUCOSE SERPL-MCNC: 97 MG/DL — SIGNIFICANT CHANGE UP (ref 70–99)
GLUCOSE SERPL-MCNC: 97 MG/DL — SIGNIFICANT CHANGE UP (ref 70–99)
GLUCOSE SERPL-MCNC: 99 MG/DL — SIGNIFICANT CHANGE UP (ref 70–99)
GRAM STN FLD: SIGNIFICANT CHANGE UP
HCO3 BLDA-SCNC: 51 MMOL/L — CRITICAL HIGH (ref 21–28)
HCO3 BLDA-SCNC: 52 MMOL/L — CRITICAL HIGH (ref 21–28)
HCO3 BLDV-SCNC: 60 MMOL/L — CRITICAL HIGH (ref 22–29)
HCT VFR BLD CALC: 28.7 % — LOW (ref 42–52)
HCT VFR BLD CALC: 29.6 % — LOW (ref 42–52)
HCT VFR BLD CALC: 30.1 % — LOW (ref 42–52)
HCT VFR BLD CALC: 32.1 % — LOW (ref 42–52)
HCT VFR BLD CALC: 32.6 % — LOW (ref 42–52)
HCT VFR BLD CALC: 32.8 % — LOW (ref 42–52)
HCT VFR BLD CALC: 33.1 % — LOW (ref 42–52)
HCT VFR BLD CALC: 33.9 % — LOW (ref 42–52)
HCT VFR BLD CALC: 34.3 % — LOW (ref 42–52)
HCT VFR BLD CALC: 34.4 % — LOW (ref 42–52)
HCT VFR BLD CALC: 34.5 % — LOW (ref 42–52)
HCT VFR BLD CALC: 34.9 % — LOW (ref 42–52)
HCT VFR BLD CALC: 35.2 % — LOW (ref 42–52)
HCT VFR BLD CALC: 35.4 % — LOW (ref 42–52)
HCT VFR BLD CALC: 35.4 % — LOW (ref 42–52)
HCT VFR BLD CALC: 35.6 % — LOW (ref 42–52)
HCT VFR BLD CALC: 35.9 % — LOW (ref 42–52)
HCT VFR BLD CALC: 36 % — LOW (ref 42–52)
HCT VFR BLD CALC: 36.1 % — LOW (ref 42–52)
HCT VFR BLD CALC: 37.8 % — LOW (ref 42–52)
HCT VFR BLD CALC: 38.3 % — LOW (ref 42–52)
HCT VFR BLD CALC: 40.6 % — LOW (ref 42–52)
HCT VFR BLDA CALC: 36 % — LOW (ref 39–51)
HDLC SERPL-MCNC: 52 MG/DL — SIGNIFICANT CHANGE UP
HDLC SERPL-MCNC: 55 MG/DL — SIGNIFICANT CHANGE UP
HDLC SERPL-MCNC: 70 MG/DL — SIGNIFICANT CHANGE UP
HGB BLD CALC-MCNC: 12 G/DL — LOW (ref 12.6–17.4)
HGB BLD-MCNC: 10.2 G/DL — LOW (ref 14–18)
HGB BLD-MCNC: 10.2 G/DL — LOW (ref 14–18)
HGB BLD-MCNC: 10.3 G/DL — LOW (ref 14–18)
HGB BLD-MCNC: 10.5 G/DL — LOW (ref 14–18)
HGB BLD-MCNC: 11.2 G/DL — LOW (ref 14–18)
HGB BLD-MCNC: 11.4 G/DL — LOW (ref 14–18)
HGB BLD-MCNC: 11.4 G/DL — LOW (ref 14–18)
HGB BLD-MCNC: 11.7 G/DL — LOW (ref 14–18)
HGB BLD-MCNC: 11.8 G/DL — LOW (ref 14–18)
HGB BLD-MCNC: 11.9 G/DL — LOW (ref 14–18)
HGB BLD-MCNC: 12 G/DL — LOW (ref 14–18)
HGB BLD-MCNC: 12 G/DL — LOW (ref 14–18)
HGB BLD-MCNC: 12.1 G/DL — LOW (ref 14–18)
HGB BLD-MCNC: 12.1 G/DL — LOW (ref 14–18)
HGB BLD-MCNC: 12.5 G/DL — LOW (ref 14–18)
HGB BLD-MCNC: 12.8 G/DL — LOW (ref 14–18)
HGB BLD-MCNC: 13 G/DL — LOW (ref 14–18)
HGB BLD-MCNC: 9.2 G/DL — LOW (ref 14–18)
HGB BLD-MCNC: 9.6 G/DL — LOW (ref 14–18)
HGB BLD-MCNC: 9.6 G/DL — LOW (ref 14–18)
IMM GRANULOCYTES NFR BLD AUTO: 0.3 % — SIGNIFICANT CHANGE UP (ref 0.1–0.3)
IMM GRANULOCYTES NFR BLD AUTO: 0.4 % — HIGH (ref 0.1–0.3)
IMM GRANULOCYTES NFR BLD AUTO: 0.4 % — HIGH (ref 0.1–0.3)
IMM GRANULOCYTES NFR BLD AUTO: 0.5 % — HIGH (ref 0.1–0.3)
IMM GRANULOCYTES NFR BLD AUTO: 0.6 % — HIGH (ref 0.1–0.3)
IMM GRANULOCYTES NFR BLD AUTO: 0.7 % — HIGH (ref 0.1–0.3)
INR BLD: 0.95 RATIO — SIGNIFICANT CHANGE UP (ref 0.65–1.3)
INR BLD: 1.07 RATIO — SIGNIFICANT CHANGE UP (ref 0.65–1.3)
INR BLD: 1.11 RATIO — SIGNIFICANT CHANGE UP (ref 0.65–1.3)
LACTATE BLDV-MCNC: 1.9 MMOL/L — SIGNIFICANT CHANGE UP (ref 0.5–2)
LACTATE SERPL-SCNC: 2.1 MMOL/L — HIGH (ref 0.7–2)
LDH SERPL L TO P-CCNC: 115 U/L — SIGNIFICANT CHANGE UP
LDH SERPL L TO P-CCNC: 214 U/L — SIGNIFICANT CHANGE UP (ref 50–242)
LIPID PNL WITH DIRECT LDL SERPL: 39 MG/DL — SIGNIFICANT CHANGE UP
LIPID PNL WITH DIRECT LDL SERPL: 44 MG/DL — SIGNIFICANT CHANGE UP
LIPID PNL WITH DIRECT LDL SERPL: 53 MG/DL — SIGNIFICANT CHANGE UP
LYMPHOCYTES # BLD AUTO: 0.27 K/UL — LOW (ref 1.2–3.4)
LYMPHOCYTES # BLD AUTO: 0.29 K/UL — LOW (ref 1.2–3.4)
LYMPHOCYTES # BLD AUTO: 0.31 K/UL — LOW (ref 1.2–3.4)
LYMPHOCYTES # BLD AUTO: 0.33 K/UL — LOW (ref 1.2–3.4)
LYMPHOCYTES # BLD AUTO: 0.54 K/UL — LOW (ref 1.2–3.4)
LYMPHOCYTES # BLD AUTO: 0.57 K/UL — LOW (ref 1.2–3.4)
LYMPHOCYTES # BLD AUTO: 0.92 K/UL — LOW (ref 1.2–3.4)
LYMPHOCYTES # BLD AUTO: 1.2 K/UL — SIGNIFICANT CHANGE UP (ref 1.2–3.4)
LYMPHOCYTES # BLD AUTO: 1.25 K/UL — SIGNIFICANT CHANGE UP (ref 1.2–3.4)
LYMPHOCYTES # BLD AUTO: 1.25 K/UL — SIGNIFICANT CHANGE UP (ref 1.2–3.4)
LYMPHOCYTES # BLD AUTO: 1.27 K/UL — SIGNIFICANT CHANGE UP (ref 1.2–3.4)
LYMPHOCYTES # BLD AUTO: 1.27 K/UL — SIGNIFICANT CHANGE UP (ref 1.2–3.4)
LYMPHOCYTES # BLD AUTO: 1.47 K/UL — SIGNIFICANT CHANGE UP (ref 1.2–3.4)
LYMPHOCYTES # BLD AUTO: 1.5 K/UL — SIGNIFICANT CHANGE UP (ref 1.2–3.4)
LYMPHOCYTES # BLD AUTO: 1.52 K/UL — SIGNIFICANT CHANGE UP (ref 1.2–3.4)
LYMPHOCYTES # BLD AUTO: 1.97 K/UL — SIGNIFICANT CHANGE UP (ref 1.2–3.4)
LYMPHOCYTES # BLD AUTO: 14.4 % — LOW (ref 20.5–51.1)
LYMPHOCYTES # BLD AUTO: 17.8 % — LOW (ref 20.5–51.1)
LYMPHOCYTES # BLD AUTO: 18.4 % — LOW (ref 20.5–51.1)
LYMPHOCYTES # BLD AUTO: 18.5 % — LOW (ref 20.5–51.1)
LYMPHOCYTES # BLD AUTO: 19.9 % — LOW (ref 20.5–51.1)
LYMPHOCYTES # BLD AUTO: 2.3 % — LOW (ref 20.5–51.1)
LYMPHOCYTES # BLD AUTO: 2.4 % — LOW (ref 20.5–51.1)
LYMPHOCYTES # BLD AUTO: 2.4 % — LOW (ref 20.5–51.1)
LYMPHOCYTES # BLD AUTO: 22.1 % — SIGNIFICANT CHANGE UP (ref 20.5–51.1)
LYMPHOCYTES # BLD AUTO: 23.2 % — SIGNIFICANT CHANGE UP (ref 20.5–51.1)
LYMPHOCYTES # BLD AUTO: 23.9 % — SIGNIFICANT CHANGE UP (ref 20.5–51.1)
LYMPHOCYTES # BLD AUTO: 25.4 % — SIGNIFICANT CHANGE UP (ref 20.5–51.1)
LYMPHOCYTES # BLD AUTO: 3.7 % — LOW (ref 20.5–51.1)
LYMPHOCYTES # BLD AUTO: 3.9 % — LOW (ref 20.5–51.1)
LYMPHOCYTES # BLD AUTO: 5.6 % — LOW (ref 20.5–51.1)
LYMPHOCYTES # BLD AUTO: 5.8 % — LOW (ref 20.5–51.1)
LYMPHOCYTES # FLD: 81 — SIGNIFICANT CHANGE UP
MAGNESIUM SERPL-MCNC: 1.8 MG/DL — SIGNIFICANT CHANGE UP (ref 1.8–2.4)
MAGNESIUM SERPL-MCNC: 1.9 MG/DL — SIGNIFICANT CHANGE UP (ref 1.8–2.4)
MAGNESIUM SERPL-MCNC: 2 MG/DL — SIGNIFICANT CHANGE UP (ref 1.8–2.4)
MAGNESIUM SERPL-MCNC: 2.1 MG/DL — SIGNIFICANT CHANGE UP (ref 1.8–2.4)
MAGNESIUM SERPL-MCNC: 2.1 MG/DL — SIGNIFICANT CHANGE UP (ref 1.8–2.4)
MAGNESIUM SERPL-MCNC: 2.3 MG/DL — SIGNIFICANT CHANGE UP (ref 1.8–2.4)
MCHC RBC-ENTMCNC: 30 PG — SIGNIFICANT CHANGE UP (ref 27–31)
MCHC RBC-ENTMCNC: 30.1 PG — SIGNIFICANT CHANGE UP (ref 27–31)
MCHC RBC-ENTMCNC: 30.2 PG — SIGNIFICANT CHANGE UP (ref 27–31)
MCHC RBC-ENTMCNC: 30.2 PG — SIGNIFICANT CHANGE UP (ref 27–31)
MCHC RBC-ENTMCNC: 30.3 PG — SIGNIFICANT CHANGE UP (ref 27–31)
MCHC RBC-ENTMCNC: 30.4 PG — SIGNIFICANT CHANGE UP (ref 27–31)
MCHC RBC-ENTMCNC: 30.5 PG — SIGNIFICANT CHANGE UP (ref 27–31)
MCHC RBC-ENTMCNC: 30.5 PG — SIGNIFICANT CHANGE UP (ref 27–31)
MCHC RBC-ENTMCNC: 30.7 PG — SIGNIFICANT CHANGE UP (ref 27–31)
MCHC RBC-ENTMCNC: 30.7 PG — SIGNIFICANT CHANGE UP (ref 27–31)
MCHC RBC-ENTMCNC: 30.8 PG — SIGNIFICANT CHANGE UP (ref 27–31)
MCHC RBC-ENTMCNC: 30.8 PG — SIGNIFICANT CHANGE UP (ref 27–31)
MCHC RBC-ENTMCNC: 30.9 PG — SIGNIFICANT CHANGE UP (ref 27–31)
MCHC RBC-ENTMCNC: 30.9 PG — SIGNIFICANT CHANGE UP (ref 27–31)
MCHC RBC-ENTMCNC: 31.1 G/DL — LOW (ref 32–37)
MCHC RBC-ENTMCNC: 31.1 PG — HIGH (ref 27–31)
MCHC RBC-ENTMCNC: 31.2 PG — HIGH (ref 27–31)
MCHC RBC-ENTMCNC: 31.2 PG — HIGH (ref 27–31)
MCHC RBC-ENTMCNC: 31.4 PG — HIGH (ref 27–31)
MCHC RBC-ENTMCNC: 31.5 G/DL — LOW (ref 32–37)
MCHC RBC-ENTMCNC: 31.5 G/DL — LOW (ref 32–37)
MCHC RBC-ENTMCNC: 31.5 PG — HIGH (ref 27–31)
MCHC RBC-ENTMCNC: 31.6 G/DL — LOW (ref 32–37)
MCHC RBC-ENTMCNC: 31.6 PG — HIGH (ref 27–31)
MCHC RBC-ENTMCNC: 31.7 G/DL — LOW (ref 32–37)
MCHC RBC-ENTMCNC: 31.8 G/DL — LOW (ref 32–37)
MCHC RBC-ENTMCNC: 31.9 G/DL — LOW (ref 32–37)
MCHC RBC-ENTMCNC: 32.1 G/DL — SIGNIFICANT CHANGE UP (ref 32–37)
MCHC RBC-ENTMCNC: 32.1 G/DL — SIGNIFICANT CHANGE UP (ref 32–37)
MCHC RBC-ENTMCNC: 32.4 G/DL — SIGNIFICANT CHANGE UP (ref 32–37)
MCHC RBC-ENTMCNC: 32.5 G/DL — SIGNIFICANT CHANGE UP (ref 32–37)
MCHC RBC-ENTMCNC: 33.1 G/DL — SIGNIFICANT CHANGE UP (ref 32–37)
MCHC RBC-ENTMCNC: 33.2 G/DL — SIGNIFICANT CHANGE UP (ref 32–37)
MCHC RBC-ENTMCNC: 33.6 G/DL — SIGNIFICANT CHANGE UP (ref 32–37)
MCHC RBC-ENTMCNC: 33.7 G/DL — SIGNIFICANT CHANGE UP (ref 32–37)
MCHC RBC-ENTMCNC: 33.9 G/DL — SIGNIFICANT CHANGE UP (ref 32–37)
MCHC RBC-ENTMCNC: 33.9 G/DL — SIGNIFICANT CHANGE UP (ref 32–37)
MCHC RBC-ENTMCNC: 34.3 G/DL — SIGNIFICANT CHANGE UP (ref 32–37)
MCV RBC AUTO: 91.7 FL — SIGNIFICANT CHANGE UP (ref 80–94)
MCV RBC AUTO: 91.8 FL — SIGNIFICANT CHANGE UP (ref 80–94)
MCV RBC AUTO: 91.9 FL — SIGNIFICANT CHANGE UP (ref 80–94)
MCV RBC AUTO: 92.2 FL — SIGNIFICANT CHANGE UP (ref 80–94)
MCV RBC AUTO: 92.5 FL — SIGNIFICANT CHANGE UP (ref 80–94)
MCV RBC AUTO: 92.6 FL — SIGNIFICANT CHANGE UP (ref 80–94)
MCV RBC AUTO: 92.7 FL — SIGNIFICANT CHANGE UP (ref 80–94)
MCV RBC AUTO: 92.9 FL — SIGNIFICANT CHANGE UP (ref 80–94)
MCV RBC AUTO: 93.7 FL — SIGNIFICANT CHANGE UP (ref 80–94)
MCV RBC AUTO: 94.1 FL — HIGH (ref 80–94)
MCV RBC AUTO: 94.4 FL — HIGH (ref 80–94)
MCV RBC AUTO: 94.5 FL — HIGH (ref 80–94)
MCV RBC AUTO: 94.5 FL — HIGH (ref 80–94)
MCV RBC AUTO: 94.8 FL — HIGH (ref 80–94)
MCV RBC AUTO: 94.9 FL — HIGH (ref 80–94)
MCV RBC AUTO: 95 FL — HIGH (ref 80–94)
MCV RBC AUTO: 95.6 FL — HIGH (ref 80–94)
MCV RBC AUTO: 95.9 FL — HIGH (ref 80–94)
MCV RBC AUTO: 96.5 FL — HIGH (ref 80–94)
MCV RBC AUTO: 97 FL — HIGH (ref 80–94)
MCV RBC AUTO: 97.3 FL — HIGH (ref 80–94)
MCV RBC AUTO: 97.4 FL — HIGH (ref 80–94)
MESOTHL CELL # FLD: 1 % — SIGNIFICANT CHANGE UP
MONOCYTES # BLD AUTO: 0.34 K/UL — SIGNIFICANT CHANGE UP (ref 0.1–0.6)
MONOCYTES # BLD AUTO: 0.39 K/UL — SIGNIFICANT CHANGE UP (ref 0.1–0.6)
MONOCYTES # BLD AUTO: 0.56 K/UL — SIGNIFICANT CHANGE UP (ref 0.1–0.6)
MONOCYTES # BLD AUTO: 0.56 K/UL — SIGNIFICANT CHANGE UP (ref 0.1–0.6)
MONOCYTES # BLD AUTO: 0.58 K/UL — SIGNIFICANT CHANGE UP (ref 0.1–0.6)
MONOCYTES # BLD AUTO: 0.58 K/UL — SIGNIFICANT CHANGE UP (ref 0.1–0.6)
MONOCYTES # BLD AUTO: 0.61 K/UL — HIGH (ref 0.1–0.6)
MONOCYTES # BLD AUTO: 0.61 K/UL — HIGH (ref 0.1–0.6)
MONOCYTES # BLD AUTO: 0.62 K/UL — HIGH (ref 0.1–0.6)
MONOCYTES # BLD AUTO: 0.63 K/UL — HIGH (ref 0.1–0.6)
MONOCYTES # BLD AUTO: 0.7 K/UL — HIGH (ref 0.1–0.6)
MONOCYTES # BLD AUTO: 0.73 K/UL — HIGH (ref 0.1–0.6)
MONOCYTES # BLD AUTO: 0.76 K/UL — HIGH (ref 0.1–0.6)
MONOCYTES # BLD AUTO: 0.9 K/UL — HIGH (ref 0.1–0.6)
MONOCYTES # BLD AUTO: 0.95 K/UL — HIGH (ref 0.1–0.6)
MONOCYTES # BLD AUTO: 1.16 K/UL — HIGH (ref 0.1–0.6)
MONOCYTES NFR BLD AUTO: 10.2 % — HIGH (ref 1.7–9.3)
MONOCYTES NFR BLD AUTO: 11.6 % — HIGH (ref 1.7–9.3)
MONOCYTES NFR BLD AUTO: 11.7 % — HIGH (ref 1.7–9.3)
MONOCYTES NFR BLD AUTO: 3.5 % — SIGNIFICANT CHANGE UP (ref 1.7–9.3)
MONOCYTES NFR BLD AUTO: 3.8 % — SIGNIFICANT CHANGE UP (ref 1.7–9.3)
MONOCYTES NFR BLD AUTO: 4.2 % — SIGNIFICANT CHANGE UP (ref 1.7–9.3)
MONOCYTES NFR BLD AUTO: 4.7 % — SIGNIFICANT CHANGE UP (ref 1.7–9.3)
MONOCYTES NFR BLD AUTO: 6.6 % — SIGNIFICANT CHANGE UP (ref 1.7–9.3)
MONOCYTES NFR BLD AUTO: 6.6 % — SIGNIFICANT CHANGE UP (ref 1.7–9.3)
MONOCYTES NFR BLD AUTO: 7.3 % — SIGNIFICANT CHANGE UP (ref 1.7–9.3)
MONOCYTES NFR BLD AUTO: 7.6 % — SIGNIFICANT CHANGE UP (ref 1.7–9.3)
MONOCYTES NFR BLD AUTO: 8.5 % — SIGNIFICANT CHANGE UP (ref 1.7–9.3)
MONOCYTES NFR BLD AUTO: 8.9 % — SIGNIFICANT CHANGE UP (ref 1.7–9.3)
MONOCYTES NFR BLD AUTO: 9.5 % — HIGH (ref 1.7–9.3)
MONOCYTES NFR BLD AUTO: 9.7 % — HIGH (ref 1.7–9.3)
MONOCYTES NFR BLD AUTO: 9.8 % — HIGH (ref 1.7–9.3)
MONOS+MACROS # FLD: 17 % — SIGNIFICANT CHANGE UP
MRSA PCR RESULT.: NEGATIVE — SIGNIFICANT CHANGE UP
NEUTROPHILS # BLD AUTO: 10.49 K/UL — HIGH (ref 1.4–6.5)
NEUTROPHILS # BLD AUTO: 11.1 K/UL — HIGH (ref 1.4–6.5)
NEUTROPHILS # BLD AUTO: 12.37 K/UL — HIGH (ref 1.4–6.5)
NEUTROPHILS # BLD AUTO: 12.86 K/UL — HIGH (ref 1.4–6.5)
NEUTROPHILS # BLD AUTO: 13.71 K/UL — HIGH (ref 1.4–6.5)
NEUTROPHILS # BLD AUTO: 3.85 K/UL — SIGNIFICANT CHANGE UP (ref 1.4–6.5)
NEUTROPHILS # BLD AUTO: 4.09 K/UL — SIGNIFICANT CHANGE UP (ref 1.4–6.5)
NEUTROPHILS # BLD AUTO: 4.19 K/UL — SIGNIFICANT CHANGE UP (ref 1.4–6.5)
NEUTROPHILS # BLD AUTO: 4.52 K/UL — SIGNIFICANT CHANGE UP (ref 1.4–6.5)
NEUTROPHILS # BLD AUTO: 4.6 K/UL — SIGNIFICANT CHANGE UP (ref 1.4–6.5)
NEUTROPHILS # BLD AUTO: 4.73 K/UL — SIGNIFICANT CHANGE UP (ref 1.4–6.5)
NEUTROPHILS # BLD AUTO: 4.81 K/UL — SIGNIFICANT CHANGE UP (ref 1.4–6.5)
NEUTROPHILS # BLD AUTO: 5.06 K/UL — SIGNIFICANT CHANGE UP (ref 1.4–6.5)
NEUTROPHILS # BLD AUTO: 6.77 K/UL — HIGH (ref 1.4–6.5)
NEUTROPHILS # BLD AUTO: 8.1 K/UL — HIGH (ref 1.4–6.5)
NEUTROPHILS # BLD AUTO: 8.24 K/UL — HIGH (ref 1.4–6.5)
NEUTROPHILS NFR BLD AUTO: 60.9 % — SIGNIFICANT CHANGE UP (ref 42.2–75.2)
NEUTROPHILS NFR BLD AUTO: 62.6 % — SIGNIFICANT CHANGE UP (ref 42.2–75.2)
NEUTROPHILS NFR BLD AUTO: 63.2 % — SIGNIFICANT CHANGE UP (ref 42.2–75.2)
NEUTROPHILS NFR BLD AUTO: 66.8 % — SIGNIFICANT CHANGE UP (ref 42.2–75.2)
NEUTROPHILS NFR BLD AUTO: 66.9 % — SIGNIFICANT CHANGE UP (ref 42.2–75.2)
NEUTROPHILS NFR BLD AUTO: 69.6 % — SIGNIFICANT CHANGE UP (ref 42.2–75.2)
NEUTROPHILS NFR BLD AUTO: 70.6 % — SIGNIFICANT CHANGE UP (ref 42.2–75.2)
NEUTROPHILS NFR BLD AUTO: 70.7 % — SIGNIFICANT CHANGE UP (ref 42.2–75.2)
NEUTROPHILS NFR BLD AUTO: 77 % — HIGH (ref 42.2–75.2)
NEUTROPHILS NFR BLD AUTO: 85.9 % — HIGH (ref 42.2–75.2)
NEUTROPHILS NFR BLD AUTO: 86.2 % — HIGH (ref 42.2–75.2)
NEUTROPHILS NFR BLD AUTO: 88.7 % — HIGH (ref 42.2–75.2)
NEUTROPHILS NFR BLD AUTO: 91.7 % — HIGH (ref 42.2–75.2)
NEUTROPHILS NFR BLD AUTO: 92.3 % — HIGH (ref 42.2–75.2)
NEUTROPHILS NFR BLD AUTO: 92.9 % — HIGH (ref 42.2–75.2)
NEUTROPHILS NFR BLD AUTO: 93.7 % — HIGH (ref 42.2–75.2)
NEUTROPHILS-BODY FLUID: 1 % — SIGNIFICANT CHANGE UP
NON HDL CHOLESTEROL: 56 MG/DL — SIGNIFICANT CHANGE UP
NON HDL CHOLESTEROL: 56 MG/DL — SIGNIFICANT CHANGE UP
NON HDL CHOLESTEROL: 65 MG/DL — SIGNIFICANT CHANGE UP
NON-GYNECOLOGICAL CYTOLOGY STUDY: SIGNIFICANT CHANGE UP
NON-GYNECOLOGICAL CYTOLOGY STUDY: SIGNIFICANT CHANGE UP
NRBC # BLD: 0 /100 WBCS — SIGNIFICANT CHANGE UP (ref 0–0)
NT-PROBNP SERPL-SCNC: 2981 PG/ML — HIGH (ref 0–300)
NT-PROBNP SERPL-SCNC: 344 PG/ML — HIGH (ref 0–300)
NT-PROBNP SERPL-SCNC: 4131 PG/ML — HIGH (ref 0–300)
PCO2 BLDA: 60 MMHG — HIGH (ref 35–48)
PCO2 BLDA: 64 MMHG — HIGH (ref 35–48)
PCO2 BLDV: 93 MMHG — HIGH (ref 42–55)
PH BLDA: 7.52 — HIGH (ref 7.35–7.45)
PH BLDA: 7.54 — HIGH (ref 7.35–7.45)
PH BLDV: 7.42 — SIGNIFICANT CHANGE UP (ref 7.32–7.43)
PH FLD: 8.2 — SIGNIFICANT CHANGE UP
PHOSPHATE SERPL-MCNC: 2.5 MG/DL — SIGNIFICANT CHANGE UP (ref 2.1–4.9)
PHOSPHATE SERPL-MCNC: 3.5 MG/DL — SIGNIFICANT CHANGE UP (ref 2.1–4.9)
PLATELET # BLD AUTO: 189 K/UL — SIGNIFICANT CHANGE UP (ref 130–400)
PLATELET # BLD AUTO: 198 K/UL — SIGNIFICANT CHANGE UP (ref 130–400)
PLATELET # BLD AUTO: 201 K/UL — SIGNIFICANT CHANGE UP (ref 130–400)
PLATELET # BLD AUTO: 201 K/UL — SIGNIFICANT CHANGE UP (ref 130–400)
PLATELET # BLD AUTO: 207 K/UL — SIGNIFICANT CHANGE UP (ref 130–400)
PLATELET # BLD AUTO: 215 K/UL — SIGNIFICANT CHANGE UP (ref 130–400)
PLATELET # BLD AUTO: 215 K/UL — SIGNIFICANT CHANGE UP (ref 130–400)
PLATELET # BLD AUTO: 229 K/UL — SIGNIFICANT CHANGE UP (ref 130–400)
PLATELET # BLD AUTO: 233 K/UL — SIGNIFICANT CHANGE UP (ref 130–400)
PLATELET # BLD AUTO: 233 K/UL — SIGNIFICANT CHANGE UP (ref 130–400)
PLATELET # BLD AUTO: 234 K/UL — SIGNIFICANT CHANGE UP (ref 130–400)
PLATELET # BLD AUTO: 238 K/UL — SIGNIFICANT CHANGE UP (ref 130–400)
PLATELET # BLD AUTO: 242 K/UL — SIGNIFICANT CHANGE UP (ref 130–400)
PLATELET # BLD AUTO: 243 K/UL — SIGNIFICANT CHANGE UP (ref 130–400)
PLATELET # BLD AUTO: 246 K/UL — SIGNIFICANT CHANGE UP (ref 130–400)
PLATELET # BLD AUTO: 254 K/UL — SIGNIFICANT CHANGE UP (ref 130–400)
PLATELET # BLD AUTO: 275 K/UL — SIGNIFICANT CHANGE UP (ref 130–400)
PLATELET # BLD AUTO: 277 K/UL — SIGNIFICANT CHANGE UP (ref 130–400)
PLATELET # BLD AUTO: 289 K/UL — SIGNIFICANT CHANGE UP (ref 130–400)
PLATELET # BLD AUTO: 295 K/UL — SIGNIFICANT CHANGE UP (ref 130–400)
PLATELET # BLD AUTO: 298 K/UL — SIGNIFICANT CHANGE UP (ref 130–400)
PLATELET # BLD AUTO: 309 K/UL — SIGNIFICANT CHANGE UP (ref 130–400)
PO2 BLDA: 178 MMHG — HIGH (ref 83–108)
PO2 BLDA: 86 MMHG — SIGNIFICANT CHANGE UP (ref 83–108)
PO2 BLDV: 21 MMHG — SIGNIFICANT CHANGE UP
POTASSIUM BLDV-SCNC: 3.7 MMOL/L — SIGNIFICANT CHANGE UP (ref 3.5–5.1)
POTASSIUM SERPL-MCNC: 3.8 MMOL/L — SIGNIFICANT CHANGE UP (ref 3.5–5)
POTASSIUM SERPL-MCNC: 3.9 MMOL/L — SIGNIFICANT CHANGE UP (ref 3.5–5)
POTASSIUM SERPL-MCNC: 4.2 MMOL/L — SIGNIFICANT CHANGE UP (ref 3.5–5)
POTASSIUM SERPL-MCNC: 4.2 MMOL/L — SIGNIFICANT CHANGE UP (ref 3.5–5)
POTASSIUM SERPL-MCNC: 4.3 MMOL/L — SIGNIFICANT CHANGE UP (ref 3.5–5)
POTASSIUM SERPL-MCNC: 4.4 MMOL/L — SIGNIFICANT CHANGE UP (ref 3.5–5)
POTASSIUM SERPL-MCNC: 4.5 MMOL/L — SIGNIFICANT CHANGE UP (ref 3.5–5)
POTASSIUM SERPL-MCNC: 4.5 MMOL/L — SIGNIFICANT CHANGE UP (ref 3.5–5)
POTASSIUM SERPL-MCNC: 4.6 MMOL/L — SIGNIFICANT CHANGE UP (ref 3.5–5)
POTASSIUM SERPL-MCNC: 4.7 MMOL/L — SIGNIFICANT CHANGE UP (ref 3.5–5)
POTASSIUM SERPL-MCNC: 4.8 MMOL/L — SIGNIFICANT CHANGE UP (ref 3.5–5)
POTASSIUM SERPL-MCNC: 4.9 MMOL/L — SIGNIFICANT CHANGE UP (ref 3.5–5)
POTASSIUM SERPL-MCNC: 5.4 MMOL/L — HIGH (ref 3.5–5)
POTASSIUM SERPL-SCNC: 3.8 MMOL/L — SIGNIFICANT CHANGE UP (ref 3.5–5)
POTASSIUM SERPL-SCNC: 3.9 MMOL/L — SIGNIFICANT CHANGE UP (ref 3.5–5)
POTASSIUM SERPL-SCNC: 4.2 MMOL/L — SIGNIFICANT CHANGE UP (ref 3.5–5)
POTASSIUM SERPL-SCNC: 4.2 MMOL/L — SIGNIFICANT CHANGE UP (ref 3.5–5)
POTASSIUM SERPL-SCNC: 4.3 MMOL/L — SIGNIFICANT CHANGE UP (ref 3.5–5)
POTASSIUM SERPL-SCNC: 4.4 MMOL/L — SIGNIFICANT CHANGE UP (ref 3.5–5)
POTASSIUM SERPL-SCNC: 4.5 MMOL/L — SIGNIFICANT CHANGE UP (ref 3.5–5)
POTASSIUM SERPL-SCNC: 4.5 MMOL/L — SIGNIFICANT CHANGE UP (ref 3.5–5)
POTASSIUM SERPL-SCNC: 4.6 MMOL/L — SIGNIFICANT CHANGE UP (ref 3.5–5)
POTASSIUM SERPL-SCNC: 4.7 MMOL/L — SIGNIFICANT CHANGE UP (ref 3.5–5)
POTASSIUM SERPL-SCNC: 4.8 MMOL/L — SIGNIFICANT CHANGE UP (ref 3.5–5)
POTASSIUM SERPL-SCNC: 4.9 MMOL/L — SIGNIFICANT CHANGE UP (ref 3.5–5)
POTASSIUM SERPL-SCNC: 5.4 MMOL/L — HIGH (ref 3.5–5)
PROCALCITONIN SERPL-MCNC: 0.06 NG/ML — SIGNIFICANT CHANGE UP (ref 0.02–0.1)
PROT FLD-MCNC: 3.7 G/DL — SIGNIFICANT CHANGE UP
PROT SERPL-MCNC: 5 G/DL — LOW (ref 6–8)
PROT SERPL-MCNC: 5.4 G/DL — LOW (ref 6–8)
PROT SERPL-MCNC: 5.5 G/DL — LOW (ref 6–8)
PROT SERPL-MCNC: 5.5 G/DL — LOW (ref 6–8)
PROT SERPL-MCNC: 5.6 G/DL — LOW (ref 6–8)
PROT SERPL-MCNC: 5.6 G/DL — LOW (ref 6–8)
PROT SERPL-MCNC: 5.8 G/DL — LOW (ref 6–8)
PROT SERPL-MCNC: 5.9 G/DL — LOW (ref 6–8)
PROT SERPL-MCNC: 6 G/DL — SIGNIFICANT CHANGE UP (ref 6–8)
PROT SERPL-MCNC: 6.3 G/DL — SIGNIFICANT CHANGE UP (ref 6–8)
PROT SERPL-MCNC: 6.4 G/DL — SIGNIFICANT CHANGE UP (ref 6–8)
PROT SERPL-MCNC: 6.6 G/DL — SIGNIFICANT CHANGE UP (ref 6–8)
PROT SERPL-MCNC: 6.6 G/DL — SIGNIFICANT CHANGE UP (ref 6–8)
PROT SERPL-MCNC: 7 G/DL — SIGNIFICANT CHANGE UP (ref 6–8)
PROTHROM AB SERPL-ACNC: 10.9 SEC — SIGNIFICANT CHANGE UP (ref 9.95–12.87)
PROTHROM AB SERPL-ACNC: 12.3 SEC — SIGNIFICANT CHANGE UP (ref 9.95–12.87)
PROTHROM AB SERPL-ACNC: 12.7 SEC — SIGNIFICANT CHANGE UP (ref 9.95–12.87)
RAPID RVP RESULT: SIGNIFICANT CHANGE UP
RBC # BLD: 3.02 M/UL — LOW (ref 4.7–6.1)
RBC # BLD: 3.12 M/UL — LOW (ref 4.7–6.1)
RBC # BLD: 3.19 M/UL — LOW (ref 4.7–6.1)
RBC # BLD: 3.3 M/UL — LOW (ref 4.7–6.1)
RBC # BLD: 3.4 M/UL — LOW (ref 4.7–6.1)
RBC # BLD: 3.41 M/UL — LOW (ref 4.7–6.1)
RBC # BLD: 3.45 M/UL — LOW (ref 4.7–6.1)
RBC # BLD: 3.63 M/UL — LOW (ref 4.7–6.1)
RBC # BLD: 3.65 M/UL — LOW (ref 4.7–6.1)
RBC # BLD: 3.65 M/UL — LOW (ref 4.7–6.1)
RBC # BLD: 3.67 M/UL — LOW (ref 4.7–6.1)
RBC # BLD: 3.71 M/UL — LOW (ref 4.7–6.1)
RBC # BLD: 3.73 M/UL — LOW (ref 4.7–6.1)
RBC # BLD: 3.78 M/UL — LOW (ref 4.7–6.1)
RBC # BLD: 3.8 M/UL — LOW (ref 4.7–6.1)
RBC # BLD: 3.81 M/UL — LOW (ref 4.7–6.1)
RBC # BLD: 3.85 M/UL — LOW (ref 4.7–6.1)
RBC # BLD: 3.89 M/UL — LOW (ref 4.7–6.1)
RBC # BLD: 3.91 M/UL — LOW (ref 4.7–6.1)
RBC # BLD: 4.12 M/UL — LOW (ref 4.7–6.1)
RBC # BLD: 4.13 M/UL — LOW (ref 4.7–6.1)
RBC # BLD: 4.17 M/UL — LOW (ref 4.7–6.1)
RBC # FLD: 14.1 % — SIGNIFICANT CHANGE UP (ref 11.5–14.5)
RBC # FLD: 14.2 % — SIGNIFICANT CHANGE UP (ref 11.5–14.5)
RBC # FLD: 14.4 % — SIGNIFICANT CHANGE UP (ref 11.5–14.5)
RBC # FLD: 14.5 % — SIGNIFICANT CHANGE UP (ref 11.5–14.5)
RBC # FLD: 14.6 % — HIGH (ref 11.5–14.5)
RBC # FLD: 16.3 % — HIGH (ref 11.5–14.5)
RBC # FLD: 16.4 % — HIGH (ref 11.5–14.5)
RBC # FLD: 16.5 % — HIGH (ref 11.5–14.5)
RBC # FLD: 16.6 % — HIGH (ref 11.5–14.5)
RBC # FLD: 16.6 % — HIGH (ref 11.5–14.5)
RBC # FLD: 16.7 % — HIGH (ref 11.5–14.5)
RBC # FLD: 16.7 % — HIGH (ref 11.5–14.5)
RBC # FLD: 16.8 % — HIGH (ref 11.5–14.5)
RBC # FLD: 16.9 % — HIGH (ref 11.5–14.5)
RBC # FLD: 16.9 % — HIGH (ref 11.5–14.5)
RCV VOL RI: HIGH /UL (ref 0–0)
RSV RNA NPH QL NAA+NON-PROBE: SIGNIFICANT CHANGE UP
SAO2 % BLDA: 98 % — SIGNIFICANT CHANGE UP (ref 94–98)
SAO2 % BLDA: 99.8 % — HIGH (ref 94–98)
SAO2 % BLDV: 24.4 % — SIGNIFICANT CHANGE UP
SARS-COV-2 RNA SPEC QL NAA+PROBE: SIGNIFICANT CHANGE UP
SODIUM SERPL-SCNC: 131 MMOL/L — LOW (ref 135–146)
SODIUM SERPL-SCNC: 132 MMOL/L — LOW (ref 135–146)
SODIUM SERPL-SCNC: 133 MMOL/L — LOW (ref 135–146)
SODIUM SERPL-SCNC: 135 MMOL/L — SIGNIFICANT CHANGE UP (ref 135–146)
SODIUM SERPL-SCNC: 136 MMOL/L — SIGNIFICANT CHANGE UP (ref 135–146)
SODIUM SERPL-SCNC: 136 MMOL/L — SIGNIFICANT CHANGE UP (ref 135–146)
SODIUM SERPL-SCNC: 137 MMOL/L — SIGNIFICANT CHANGE UP (ref 135–146)
SODIUM SERPL-SCNC: 138 MMOL/L — SIGNIFICANT CHANGE UP (ref 135–146)
SODIUM SERPL-SCNC: 139 MMOL/L — SIGNIFICANT CHANGE UP (ref 135–146)
SODIUM SERPL-SCNC: 140 MMOL/L — SIGNIFICANT CHANGE UP (ref 135–146)
SPECIMEN SOURCE: SIGNIFICANT CHANGE UP
T3 SERPL-MCNC: 88 NG/DL — SIGNIFICANT CHANGE UP (ref 80–200)
T4 FREE SERPL-MCNC: 1.2 NG/DL — SIGNIFICANT CHANGE UP (ref 0.9–1.8)
TOTAL NUCLEATED CELL COUNT, BODY FLUID: 496 /UL — SIGNIFICANT CHANGE UP
TRIGL SERPL-MCNC: 60 MG/DL — SIGNIFICANT CHANGE UP
TRIGL SERPL-MCNC: 65 MG/DL — SIGNIFICANT CHANGE UP
TRIGL SERPL-MCNC: 86 MG/DL — SIGNIFICANT CHANGE UP
TROPONIN T SERPL-MCNC: 0.01 NG/ML — SIGNIFICANT CHANGE UP
TROPONIN T SERPL-MCNC: 0.02 NG/ML — HIGH
TROPONIN T SERPL-MCNC: 0.03 NG/ML — CRITICAL HIGH
TROPONIN T SERPL-MCNC: 0.03 NG/ML — CRITICAL HIGH
TROPONIN T SERPL-MCNC: <0.01 NG/ML — SIGNIFICANT CHANGE UP
TSH SERPL-MCNC: 3.67 UIU/ML — SIGNIFICANT CHANGE UP (ref 0.27–4.2)
TSH SERPL-MCNC: 4.45 UIU/ML — HIGH (ref 0.27–4.2)
TSH SERPL-MCNC: 5.01 UIU/ML — HIGH (ref 0.27–4.2)
TUBE TYPE: SIGNIFICANT CHANGE UP
WBC # BLD: 10.22 K/UL — SIGNIFICANT CHANGE UP (ref 4.8–10.8)
WBC # BLD: 11.1 K/UL — HIGH (ref 4.8–10.8)
WBC # BLD: 11.19 K/UL — HIGH (ref 4.8–10.8)
WBC # BLD: 12.02 K/UL — HIGH (ref 4.8–10.8)
WBC # BLD: 13.31 K/UL — HIGH (ref 4.8–10.8)
WBC # BLD: 14.49 K/UL — HIGH (ref 4.8–10.8)
WBC # BLD: 15.91 K/UL — HIGH (ref 4.8–10.8)
WBC # BLD: 6.15 K/UL — SIGNIFICANT CHANGE UP (ref 4.8–10.8)
WBC # BLD: 6.27 K/UL — SIGNIFICANT CHANGE UP (ref 4.8–10.8)
WBC # BLD: 6.47 K/UL — SIGNIFICANT CHANGE UP (ref 4.8–10.8)
WBC # BLD: 6.5 K/UL — SIGNIFICANT CHANGE UP (ref 4.8–10.8)
WBC # BLD: 6.81 K/UL — SIGNIFICANT CHANGE UP (ref 4.8–10.8)
WBC # BLD: 6.88 K/UL — SIGNIFICANT CHANGE UP (ref 4.8–10.8)
WBC # BLD: 7.12 K/UL — SIGNIFICANT CHANGE UP (ref 4.8–10.8)
WBC # BLD: 7.15 K/UL — SIGNIFICANT CHANGE UP (ref 4.8–10.8)
WBC # BLD: 7.76 K/UL — SIGNIFICANT CHANGE UP (ref 4.8–10.8)
WBC # BLD: 8.14 K/UL — SIGNIFICANT CHANGE UP (ref 4.8–10.8)
WBC # BLD: 8.41 K/UL — SIGNIFICANT CHANGE UP (ref 4.8–10.8)
WBC # BLD: 8.57 K/UL — SIGNIFICANT CHANGE UP (ref 4.8–10.8)
WBC # BLD: 8.79 K/UL — SIGNIFICANT CHANGE UP (ref 4.8–10.8)
WBC # BLD: 8.84 K/UL — SIGNIFICANT CHANGE UP (ref 4.8–10.8)
WBC # BLD: 9.6 K/UL — SIGNIFICANT CHANGE UP (ref 4.8–10.8)
WBC # FLD AUTO: 10.22 K/UL — SIGNIFICANT CHANGE UP (ref 4.8–10.8)
WBC # FLD AUTO: 11.1 K/UL — HIGH (ref 4.8–10.8)
WBC # FLD AUTO: 11.19 K/UL — HIGH (ref 4.8–10.8)
WBC # FLD AUTO: 12.02 K/UL — HIGH (ref 4.8–10.8)
WBC # FLD AUTO: 13.31 K/UL — HIGH (ref 4.8–10.8)
WBC # FLD AUTO: 14.49 K/UL — HIGH (ref 4.8–10.8)
WBC # FLD AUTO: 15.91 K/UL — HIGH (ref 4.8–10.8)
WBC # FLD AUTO: 6.15 K/UL — SIGNIFICANT CHANGE UP (ref 4.8–10.8)
WBC # FLD AUTO: 6.27 K/UL — SIGNIFICANT CHANGE UP (ref 4.8–10.8)
WBC # FLD AUTO: 6.47 K/UL — SIGNIFICANT CHANGE UP (ref 4.8–10.8)
WBC # FLD AUTO: 6.5 K/UL — SIGNIFICANT CHANGE UP (ref 4.8–10.8)
WBC # FLD AUTO: 6.81 K/UL — SIGNIFICANT CHANGE UP (ref 4.8–10.8)
WBC # FLD AUTO: 6.88 K/UL — SIGNIFICANT CHANGE UP (ref 4.8–10.8)
WBC # FLD AUTO: 7.12 K/UL — SIGNIFICANT CHANGE UP (ref 4.8–10.8)
WBC # FLD AUTO: 7.15 K/UL — SIGNIFICANT CHANGE UP (ref 4.8–10.8)
WBC # FLD AUTO: 7.76 K/UL — SIGNIFICANT CHANGE UP (ref 4.8–10.8)
WBC # FLD AUTO: 8.14 K/UL — SIGNIFICANT CHANGE UP (ref 4.8–10.8)
WBC # FLD AUTO: 8.41 K/UL — SIGNIFICANT CHANGE UP (ref 4.8–10.8)
WBC # FLD AUTO: 8.57 K/UL — SIGNIFICANT CHANGE UP (ref 4.8–10.8)
WBC # FLD AUTO: 8.79 K/UL — SIGNIFICANT CHANGE UP (ref 4.8–10.8)
WBC # FLD AUTO: 8.84 K/UL — SIGNIFICANT CHANGE UP (ref 4.8–10.8)
WBC # FLD AUTO: 9.6 K/UL — SIGNIFICANT CHANGE UP (ref 4.8–10.8)

## 2022-01-01 PROCEDURE — 99239 HOSP IP/OBS DSCHRG MGMT >30: CPT

## 2022-01-01 PROCEDURE — 93970 EXTREMITY STUDY: CPT | Mod: 26

## 2022-01-01 PROCEDURE — 71045 X-RAY EXAM CHEST 1 VIEW: CPT | Mod: 26

## 2022-01-01 PROCEDURE — 93010 ELECTROCARDIOGRAM REPORT: CPT

## 2022-01-01 PROCEDURE — 99205 OFFICE O/P NEW HI 60 MIN: CPT

## 2022-01-01 PROCEDURE — 93000 ELECTROCARDIOGRAM COMPLETE: CPT

## 2022-01-01 PROCEDURE — 88172 CYTP DX EVAL FNA 1ST EA SITE: CPT | Mod: 26

## 2022-01-01 PROCEDURE — 70498 CT ANGIOGRAPHY NECK: CPT | Mod: 26

## 2022-01-01 PROCEDURE — 93306 TTE W/DOPPLER COMPLETE: CPT | Mod: 26

## 2022-01-01 PROCEDURE — 93010 ELECTROCARDIOGRAM REPORT: CPT | Mod: 76

## 2022-01-01 PROCEDURE — 88112 CYTOPATH CELL ENHANCE TECH: CPT | Mod: 26

## 2022-01-01 PROCEDURE — 99223 1ST HOSP IP/OBS HIGH 75: CPT

## 2022-01-01 PROCEDURE — 99232 SBSQ HOSP IP/OBS MODERATE 35: CPT

## 2022-01-01 PROCEDURE — 99291 CRITICAL CARE FIRST HOUR: CPT | Mod: 25

## 2022-01-01 PROCEDURE — 99233 SBSQ HOSP IP/OBS HIGH 50: CPT

## 2022-01-01 PROCEDURE — 99213 OFFICE O/P EST LOW 20 MIN: CPT

## 2022-01-01 PROCEDURE — 99285 EMERGENCY DEPT VISIT HI MDM: CPT | Mod: FS

## 2022-01-01 PROCEDURE — 88342 IMHCHEM/IMCYTCHM 1ST ANTB: CPT | Mod: 26

## 2022-01-01 PROCEDURE — 99221 1ST HOSP IP/OBS SF/LOW 40: CPT

## 2022-01-01 PROCEDURE — 71275 CT ANGIOGRAPHY CHEST: CPT | Mod: 26,MA

## 2022-01-01 PROCEDURE — 32408 CORE NDL BX LNG/MED PERQ: CPT

## 2022-01-01 PROCEDURE — 70450 CT HEAD/BRAIN W/O DYE: CPT | Mod: 26

## 2022-01-01 PROCEDURE — 93010 ELECTROCARDIOGRAM REPORT: CPT | Mod: 77

## 2022-01-01 PROCEDURE — 77263 THER RADIOLOGY TX PLNG CPLX: CPT

## 2022-01-01 PROCEDURE — 71260 CT THORAX DX C+: CPT | Mod: 26

## 2022-01-01 PROCEDURE — 77334 RADIATION TREATMENT AID(S): CPT | Mod: 26

## 2022-01-01 PROCEDURE — 99497 ADVNCD CARE PLAN 30 MIN: CPT | Mod: 25

## 2022-01-01 PROCEDURE — 99222 1ST HOSP IP/OBS MODERATE 55: CPT

## 2022-01-01 PROCEDURE — 99285 EMERGENCY DEPT VISIT HI MDM: CPT | Mod: FS,CS

## 2022-01-01 PROCEDURE — 32554 ASPIRATE PLEURA W/O IMAGING: CPT

## 2022-01-01 PROCEDURE — 88305 TISSUE EXAM BY PATHOLOGIST: CPT | Mod: 26,59

## 2022-01-01 PROCEDURE — 31652 BRONCH EBUS SAMPLNG 1/2 NODE: CPT

## 2022-01-01 PROCEDURE — 88173 CYTOPATH EVAL FNA REPORT: CPT | Mod: 26

## 2022-01-01 PROCEDURE — 99448 NTRPROF PH1/NTRNET/EHR 21-30: CPT

## 2022-01-01 PROCEDURE — 71045 X-RAY EXAM CHEST 1 VIEW: CPT | Mod: 26,77

## 2022-01-01 PROCEDURE — 99291 CRITICAL CARE FIRST HOUR: CPT | Mod: FT,25,GC

## 2022-01-01 PROCEDURE — 99291 CRITICAL CARE FIRST HOUR: CPT

## 2022-01-01 PROCEDURE — 77427 RADIATION TX MANAGEMENT X5: CPT

## 2022-01-01 PROCEDURE — 99214 OFFICE O/P EST MOD 30 MIN: CPT

## 2022-01-01 PROCEDURE — 88305 TISSUE EXAM BY PATHOLOGIST: CPT | Mod: 26

## 2022-01-01 PROCEDURE — 99214 OFFICE O/P EST MOD 30 MIN: CPT | Mod: 25

## 2022-01-01 PROCEDURE — 77295 3-D RADIOTHERAPY PLAN: CPT | Mod: 26

## 2022-01-01 PROCEDURE — 93880 EXTRACRANIAL BILAT STUDY: CPT | Mod: 26

## 2022-01-01 PROCEDURE — 78815 PET IMAGE W/CT SKULL-THIGH: CPT | Mod: 26,PI,MH

## 2022-01-01 PROCEDURE — 77300 RADIATION THERAPY DOSE PLAN: CPT | Mod: 26

## 2022-01-01 PROCEDURE — 93308 TTE F-UP OR LMTD: CPT | Mod: 26,GC

## 2022-01-01 PROCEDURE — 70553 MRI BRAIN STEM W/O & W/DYE: CPT | Mod: 26

## 2022-01-01 PROCEDURE — 88173 CYTOPATH EVAL FNA REPORT: CPT | Mod: 26,59

## 2022-01-01 PROCEDURE — 70450 CT HEAD/BRAIN W/O DYE: CPT | Mod: 26,MA

## 2022-01-01 PROCEDURE — 99205 OFFICE O/P NEW HI 60 MIN: CPT | Mod: 25

## 2022-01-01 PROCEDURE — 88341 IMHCHEM/IMCYTCHM EA ADD ANTB: CPT | Mod: 26

## 2022-01-01 PROCEDURE — 32550 INSERT PLEURAL CATH: CPT

## 2022-01-01 PROCEDURE — 88312 SPECIAL STAINS GROUP 1: CPT | Mod: 26

## 2022-01-01 PROCEDURE — 71046 X-RAY EXAM CHEST 2 VIEWS: CPT

## 2022-01-01 PROCEDURE — 77280 THER RAD SIMULAJ FIELD SMPL: CPT | Mod: 26

## 2022-01-01 RX ORDER — DILTIAZEM HCL 120 MG
10 CAPSULE, EXT RELEASE 24 HR ORAL ONCE
Refills: 0 | Status: COMPLETED | OUTPATIENT
Start: 2022-01-01 | End: 2022-01-01

## 2022-01-01 RX ORDER — AZITHROMYCIN 500 MG/1
500 TABLET, FILM COATED ORAL EVERY 24 HOURS
Refills: 0 | Status: DISCONTINUED | OUTPATIENT
Start: 2022-01-01 | End: 2022-01-01

## 2022-01-01 RX ORDER — METOPROLOL TARTRATE 50 MG
100 TABLET ORAL
Refills: 0 | Status: DISCONTINUED | OUTPATIENT
Start: 2022-01-01 | End: 2022-01-01

## 2022-01-01 RX ORDER — RAMIPRIL 5 MG
1 CAPSULE ORAL
Qty: 0 | Refills: 0 | DISCHARGE

## 2022-01-01 RX ORDER — LISINOPRIL 2.5 MG/1
10 TABLET ORAL ONCE
Refills: 0 | Status: COMPLETED | OUTPATIENT
Start: 2022-01-01 | End: 2022-01-01

## 2022-01-01 RX ORDER — LANOLIN ALCOHOL/MO/W.PET/CERES
3 CREAM (GRAM) TOPICAL AT BEDTIME
Refills: 0 | Status: DISCONTINUED | OUTPATIENT
Start: 2022-01-01 | End: 2022-01-01

## 2022-01-01 RX ORDER — ENOXAPARIN SODIUM 100 MG/ML
70 INJECTION SUBCUTANEOUS EVERY 12 HOURS
Refills: 0 | Status: DISCONTINUED | OUTPATIENT
Start: 2022-01-01 | End: 2022-01-01

## 2022-01-01 RX ORDER — METOPROLOL TARTRATE 50 MG
100 TABLET ORAL DAILY
Refills: 0 | Status: DISCONTINUED | OUTPATIENT
Start: 2022-01-01 | End: 2022-01-01

## 2022-01-01 RX ORDER — METOPROLOL TARTRATE 50 MG
5 TABLET ORAL ONCE
Refills: 0 | Status: COMPLETED | OUTPATIENT
Start: 2022-01-01 | End: 2022-01-01

## 2022-01-01 RX ORDER — LISINOPRIL 2.5 MG/1
10 TABLET ORAL DAILY
Refills: 0 | Status: DISCONTINUED | OUTPATIENT
Start: 2022-01-01 | End: 2022-01-01

## 2022-01-01 RX ORDER — FUROSEMIDE 40 MG/1
40 TABLET ORAL DAILY
Qty: 30 | Refills: 2 | Status: ACTIVE | COMMUNITY
Start: 1900-01-01 | End: 1900-01-01

## 2022-01-01 RX ORDER — ATORVASTATIN CALCIUM 80 MG/1
1 TABLET, FILM COATED ORAL
Qty: 0 | Refills: 0 | DISCHARGE

## 2022-01-01 RX ORDER — MECLIZINE HCL 12.5 MG
50 TABLET ORAL ONCE
Refills: 0 | Status: COMPLETED | OUTPATIENT
Start: 2022-01-01 | End: 2022-01-01

## 2022-01-01 RX ORDER — CHLORHEXIDINE GLUCONATE 213 G/1000ML
1 SOLUTION TOPICAL
Refills: 0 | Status: DISCONTINUED | OUTPATIENT
Start: 2022-01-01 | End: 2022-01-01

## 2022-01-01 RX ORDER — ACETAMINOPHEN 500 MG
2 TABLET ORAL
Qty: 0 | Refills: 0 | DISCHARGE
Start: 2022-01-01

## 2022-01-01 RX ORDER — AMIODARONE HYDROCHLORIDE 400 MG/1
1 TABLET ORAL
Qty: 30 | Refills: 0
Start: 2022-01-01 | End: 2022-10-29

## 2022-01-01 RX ORDER — AMIODARONE HYDROCHLORIDE 400 MG/1
0.5 TABLET ORAL
Qty: 900 | Refills: 0 | Status: DISCONTINUED | OUTPATIENT
Start: 2022-01-01 | End: 2022-01-01

## 2022-01-01 RX ORDER — ACETAMINOPHEN 500 MG
650 TABLET ORAL EVERY 6 HOURS
Refills: 0 | Status: DISCONTINUED | OUTPATIENT
Start: 2022-01-01 | End: 2022-01-01

## 2022-01-01 RX ORDER — ENOXAPARIN SODIUM 100 MG/ML
60 INJECTION SUBCUTANEOUS EVERY 12 HOURS
Refills: 0 | Status: DISCONTINUED | OUTPATIENT
Start: 2022-01-01 | End: 2022-01-01

## 2022-01-01 RX ORDER — METOPROLOL TARTRATE 50 MG
1 TABLET ORAL
Qty: 30 | Refills: 0
Start: 2022-01-01 | End: 2022-01-01

## 2022-01-01 RX ORDER — ATORVASTATIN CALCIUM 80 MG/1
80 TABLET, FILM COATED ORAL DAILY
Refills: 0 | Status: DISCONTINUED | OUTPATIENT
Start: 2022-01-01 | End: 2022-01-01

## 2022-01-01 RX ORDER — FUROSEMIDE 40 MG
40 TABLET ORAL ONCE
Refills: 0 | Status: COMPLETED | OUTPATIENT
Start: 2022-01-01 | End: 2022-01-01

## 2022-01-01 RX ORDER — METOPROLOL TARTRATE 50 MG
50 TABLET ORAL ONCE
Refills: 0 | Status: COMPLETED | OUTPATIENT
Start: 2022-01-01 | End: 2022-01-01

## 2022-01-01 RX ORDER — DILTIAZEM HCL 120 MG
5 CAPSULE, EXT RELEASE 24 HR ORAL
Qty: 125 | Refills: 0 | Status: DISCONTINUED | OUTPATIENT
Start: 2022-01-01 | End: 2022-01-01

## 2022-01-01 RX ORDER — DILTIAZEM HCL 120 MG
30 CAPSULE, EXT RELEASE 24 HR ORAL EVERY 6 HOURS
Refills: 0 | Status: DISCONTINUED | OUTPATIENT
Start: 2022-01-01 | End: 2022-01-01

## 2022-01-01 RX ORDER — ATORVASTATIN CALCIUM 80 MG/1
80 TABLET, FILM COATED ORAL AT BEDTIME
Refills: 0 | Status: DISCONTINUED | OUTPATIENT
Start: 2022-01-01 | End: 2022-01-01

## 2022-01-01 RX ORDER — ALPRAZOLAM 0.25 MG
0.25 TABLET ORAL ONCE
Refills: 0 | Status: DISCONTINUED | OUTPATIENT
Start: 2022-01-01 | End: 2022-01-01

## 2022-01-01 RX ORDER — FUROSEMIDE 40 MG
1 TABLET ORAL
Qty: 0 | Refills: 0 | DISCHARGE

## 2022-01-01 RX ORDER — AMIODARONE HYDROCHLORIDE 400 MG/1
400 TABLET ORAL EVERY 12 HOURS
Refills: 0 | Status: DISCONTINUED | OUTPATIENT
Start: 2022-01-01 | End: 2022-01-01

## 2022-01-01 RX ORDER — DIGOXIN 250 MCG
1 TABLET ORAL
Qty: 30 | Refills: 0
Start: 2022-01-01 | End: 2022-01-01

## 2022-01-01 RX ORDER — SODIUM CHLORIDE 9 MG/ML
4 INJECTION INTRAMUSCULAR; INTRAVENOUS; SUBCUTANEOUS EVERY 12 HOURS
Refills: 0 | Status: DISCONTINUED | OUTPATIENT
Start: 2022-01-01 | End: 2022-01-01

## 2022-01-01 RX ORDER — ACETYLCYSTEINE 200 MG/ML
4 VIAL (ML) MISCELLANEOUS DAILY
Refills: 0 | Status: DISCONTINUED | OUTPATIENT
Start: 2022-01-01 | End: 2022-01-01

## 2022-01-01 RX ORDER — ATORVASTATIN CALCIUM 80 MG/1
80 TABLET, FILM COATED ORAL
Refills: 0 | Status: ACTIVE | COMMUNITY

## 2022-01-01 RX ORDER — IPRATROPIUM/ALBUTEROL SULFATE 18-103MCG
3 AEROSOL WITH ADAPTER (GRAM) INHALATION ONCE
Refills: 0 | Status: DISCONTINUED | OUTPATIENT
Start: 2022-01-01 | End: 2022-01-01

## 2022-01-01 RX ORDER — METOPROLOL TARTRATE 50 MG
50 TABLET ORAL EVERY 6 HOURS
Refills: 0 | Status: DISCONTINUED | OUTPATIENT
Start: 2022-01-01 | End: 2022-01-01

## 2022-01-01 RX ORDER — ALBUTEROL 90 UG/1
2.5 AEROSOL, METERED ORAL EVERY 6 HOURS
Refills: 0 | Status: DISCONTINUED | OUTPATIENT
Start: 2022-01-01 | End: 2022-01-01

## 2022-01-01 RX ORDER — SODIUM CHLORIDE 9 MG/ML
1000 INJECTION, SOLUTION INTRAVENOUS ONCE
Refills: 0 | Status: COMPLETED | OUTPATIENT
Start: 2022-01-01 | End: 2022-01-01

## 2022-01-01 RX ORDER — DILTIAZEM HCL 120 MG
120 CAPSULE, EXT RELEASE 24 HR ORAL ONCE
Refills: 0 | Status: COMPLETED | OUTPATIENT
Start: 2022-01-01 | End: 2022-01-01

## 2022-01-01 RX ORDER — RAMIPRIL 2.5 MG/1
2.5 CAPSULE ORAL
Refills: 0 | Status: DISCONTINUED | COMMUNITY
End: 2022-01-01

## 2022-01-01 RX ORDER — METOPROLOL TARTRATE 50 MG
1 TABLET ORAL
Qty: 0 | Refills: 0 | DISCHARGE
Start: 2022-01-01

## 2022-01-01 RX ORDER — ENOXAPARIN SODIUM 100 MG/ML
40 INJECTION SUBCUTANEOUS EVERY 24 HOURS
Refills: 0 | Status: DISCONTINUED | OUTPATIENT
Start: 2022-01-01 | End: 2022-01-01

## 2022-01-01 RX ORDER — ASPIRIN ENTERIC COATED TABLETS 81 MG 81 MG/1
81 TABLET, DELAYED RELEASE ORAL
Refills: 0 | Status: ACTIVE | COMMUNITY

## 2022-01-01 RX ORDER — METOPROLOL TARTRATE 50 MG
50 TABLET ORAL DAILY
Refills: 0 | Status: DISCONTINUED | OUTPATIENT
Start: 2022-01-01 | End: 2022-01-01

## 2022-01-01 RX ORDER — APIXABAN 2.5 MG/1
5 TABLET, FILM COATED ORAL EVERY 12 HOURS
Refills: 0 | Status: DISCONTINUED | OUTPATIENT
Start: 2022-01-01 | End: 2022-01-01

## 2022-01-01 RX ORDER — IPRATROPIUM/ALBUTEROL SULFATE 18-103MCG
3 AEROSOL WITH ADAPTER (GRAM) INHALATION EVERY 6 HOURS
Refills: 0 | Status: DISCONTINUED | OUTPATIENT
Start: 2022-01-01 | End: 2022-01-01

## 2022-01-01 RX ORDER — DIGOXIN 250 MCG
250 TABLET ORAL ONCE
Refills: 0 | Status: COMPLETED | OUTPATIENT
Start: 2022-01-01 | End: 2022-01-01

## 2022-01-01 RX ORDER — APIXABAN 2.5 MG/1
1 TABLET, FILM COATED ORAL
Qty: 30 | Refills: 0
Start: 2022-01-01 | End: 2022-01-01

## 2022-01-01 RX ORDER — DIGOXIN 250 MCG
125 TABLET ORAL DAILY
Refills: 0 | Status: DISCONTINUED | OUTPATIENT
Start: 2022-01-01 | End: 2022-01-01

## 2022-01-01 RX ORDER — SODIUM CHLORIDE 9 MG/ML
250 INJECTION INTRAMUSCULAR; INTRAVENOUS; SUBCUTANEOUS ONCE
Refills: 0 | Status: COMPLETED | OUTPATIENT
Start: 2022-01-01 | End: 2022-01-01

## 2022-01-01 RX ORDER — AMIODARONE HYDROCHLORIDE 400 MG/1
1 TABLET ORAL
Qty: 900 | Refills: 0 | Status: DISCONTINUED | OUTPATIENT
Start: 2022-01-01 | End: 2022-01-01

## 2022-01-01 RX ORDER — SODIUM CHLORIDE 9 MG/ML
500 INJECTION, SOLUTION INTRAVENOUS ONCE
Refills: 0 | Status: COMPLETED | OUTPATIENT
Start: 2022-01-01 | End: 2022-01-01

## 2022-01-01 RX ORDER — METOPROLOL TARTRATE 50 MG
100 TABLET ORAL ONCE
Refills: 0 | Status: DISCONTINUED | OUTPATIENT
Start: 2022-01-01 | End: 2022-01-01

## 2022-01-01 RX ORDER — ASPIRIN/CALCIUM CARB/MAGNESIUM 324 MG
1 TABLET ORAL
Qty: 30 | Refills: 0
Start: 2022-01-01 | End: 2022-01-01

## 2022-01-01 RX ORDER — SODIUM CHLORIDE 9 MG/ML
1000 INJECTION, SOLUTION INTRAVENOUS
Refills: 0 | Status: DISCONTINUED | OUTPATIENT
Start: 2022-01-01 | End: 2022-01-01

## 2022-01-01 RX ORDER — FUROSEMIDE 20 MG/1
20 TABLET ORAL DAILY
Qty: 90 | Refills: 3 | Status: DISCONTINUED | COMMUNITY
Start: 2022-01-01 | End: 2022-01-01

## 2022-01-01 RX ORDER — PANTOPRAZOLE SODIUM 20 MG/1
40 TABLET, DELAYED RELEASE ORAL
Refills: 0 | Status: DISCONTINUED | OUTPATIENT
Start: 2022-01-01 | End: 2022-01-01

## 2022-01-01 RX ORDER — FUROSEMIDE 40 MG
40 TABLET ORAL DAILY
Refills: 0 | Status: DISCONTINUED | OUTPATIENT
Start: 2022-01-01 | End: 2022-01-01

## 2022-01-01 RX ORDER — MECLIZINE HCL 12.5 MG
25 TABLET ORAL THREE TIMES A DAY
Refills: 0 | Status: DISCONTINUED | OUTPATIENT
Start: 2022-01-01 | End: 2022-01-01

## 2022-01-01 RX ORDER — AMIODARONE HYDROCHLORIDE 400 MG/1
150 TABLET ORAL ONCE
Refills: 0 | Status: COMPLETED | OUTPATIENT
Start: 2022-01-01 | End: 2022-01-01

## 2022-01-01 RX ORDER — CEFTRIAXONE 500 MG/1
1000 INJECTION, POWDER, FOR SOLUTION INTRAMUSCULAR; INTRAVENOUS EVERY 24 HOURS
Refills: 0 | Status: DISCONTINUED | OUTPATIENT
Start: 2022-01-01 | End: 2022-01-01

## 2022-01-01 RX ORDER — NOREPINEPHRINE BITARTRATE/D5W 8 MG/250ML
0.03 PLASTIC BAG, INJECTION (ML) INTRAVENOUS
Qty: 8 | Refills: 0 | Status: DISCONTINUED | OUTPATIENT
Start: 2022-01-01 | End: 2022-01-01

## 2022-01-01 RX ORDER — DIGOXIN 125 UG/1
125 TABLET ORAL DAILY
Qty: 90 | Refills: 3 | Status: ACTIVE | COMMUNITY
Start: 2022-01-01 | End: 1900-01-01

## 2022-01-01 RX ORDER — CEFTRIAXONE 500 MG/1
1000 INJECTION, POWDER, FOR SOLUTION INTRAMUSCULAR; INTRAVENOUS ONCE
Refills: 0 | Status: COMPLETED | OUTPATIENT
Start: 2022-01-01 | End: 2022-01-01

## 2022-01-01 RX ORDER — APIXABAN 5 MG/1
5 TABLET, FILM COATED ORAL
Qty: 180 | Refills: 3 | Status: ACTIVE | COMMUNITY
Start: 2022-01-01 | End: 1900-01-01

## 2022-01-01 RX ORDER — POTASSIUM CHLORIDE 20 MEQ
20 PACKET (EA) ORAL ONCE
Refills: 0 | Status: COMPLETED | OUTPATIENT
Start: 2022-01-01 | End: 2022-01-01

## 2022-01-01 RX ORDER — MECLIZINE HCL 12.5 MG
25 TABLET ORAL EVERY 8 HOURS
Refills: 0 | Status: DISCONTINUED | OUTPATIENT
Start: 2022-01-01 | End: 2022-01-01

## 2022-01-01 RX ORDER — AMIODARONE HYDROCHLORIDE 400 MG/1
200 TABLET ORAL DAILY
Refills: 0 | Status: DISCONTINUED | OUTPATIENT
Start: 2022-01-01 | End: 2022-01-01

## 2022-01-01 RX ORDER — ALBUTEROL 90 UG/1
2 AEROSOL, METERED ORAL EVERY 6 HOURS
Refills: 0 | Status: DISCONTINUED | OUTPATIENT
Start: 2022-01-01 | End: 2022-01-01

## 2022-01-01 RX ORDER — METOPROLOL SUCCINATE 50 MG/1
50 TABLET, EXTENDED RELEASE ORAL DAILY
Qty: 90 | Refills: 3 | Status: ACTIVE | COMMUNITY
Start: 2022-01-01 | End: 1900-01-01

## 2022-01-01 RX ORDER — ACETAMINOPHEN 500 MG
650 TABLET ORAL ONCE
Refills: 0 | Status: COMPLETED | OUTPATIENT
Start: 2022-01-01 | End: 2022-01-01

## 2022-01-01 RX ORDER — AZITHROMYCIN 500 MG/1
500 TABLET, FILM COATED ORAL ONCE
Refills: 0 | Status: COMPLETED | OUTPATIENT
Start: 2022-01-01 | End: 2022-01-01

## 2022-01-01 RX ORDER — DILTIAZEM HCL 120 MG
60 CAPSULE, EXT RELEASE 24 HR ORAL ONCE
Refills: 0 | Status: COMPLETED | OUTPATIENT
Start: 2022-01-01 | End: 2022-01-01

## 2022-01-01 RX ORDER — METOPROLOL TARTRATE 50 MG
50 TABLET ORAL
Refills: 0 | Status: DISCONTINUED | OUTPATIENT
Start: 2022-01-01 | End: 2022-01-01

## 2022-01-01 RX ORDER — ASPIRIN/CALCIUM CARB/MAGNESIUM 324 MG
81 TABLET ORAL DAILY
Refills: 0 | Status: DISCONTINUED | OUTPATIENT
Start: 2022-01-01 | End: 2022-01-01

## 2022-01-01 RX ORDER — PANTOPRAZOLE SODIUM 20 MG/1
1 TABLET, DELAYED RELEASE ORAL
Qty: 14 | Refills: 0
Start: 2022-01-01 | End: 2022-10-13

## 2022-01-01 RX ORDER — MECLIZINE HCL 12.5 MG
25 TABLET ORAL DAILY
Refills: 0 | Status: DISCONTINUED | OUTPATIENT
Start: 2022-01-01 | End: 2022-01-01

## 2022-01-01 RX ORDER — MECLIZINE HCL 12.5 MG
1 TABLET ORAL
Qty: 0 | Refills: 0 | DISCHARGE

## 2022-01-01 RX ORDER — DILTIAZEM HCL 120 MG
5 CAPSULE, EXT RELEASE 24 HR ORAL ONCE
Refills: 0 | Status: COMPLETED | OUTPATIENT
Start: 2022-01-01 | End: 2022-01-01

## 2022-01-01 RX ORDER — METOPROLOL TARTRATE 50 MG
1 TABLET ORAL
Qty: 60 | Refills: 0
Start: 2022-01-01 | End: 2022-10-29

## 2022-01-01 RX ADMIN — Medication 60 MILLIGRAM(S): at 15:15

## 2022-01-01 RX ADMIN — SODIUM CHLORIDE 500 MILLILITER(S): 9 INJECTION, SOLUTION INTRAVENOUS at 11:00

## 2022-01-01 RX ADMIN — PANTOPRAZOLE SODIUM 40 MILLIGRAM(S): 20 TABLET, DELAYED RELEASE ORAL at 05:37

## 2022-01-01 RX ADMIN — LISINOPRIL 10 MILLIGRAM(S): 2.5 TABLET ORAL at 05:47

## 2022-01-01 RX ADMIN — Medication 650 MILLIGRAM(S): at 22:45

## 2022-01-01 RX ADMIN — Medication 50 MILLIGRAM(S): at 13:39

## 2022-01-01 RX ADMIN — Medication 60 MILLIGRAM(S): at 18:00

## 2022-01-01 RX ADMIN — Medication 3 MILLILITER(S): at 13:32

## 2022-01-01 RX ADMIN — CEFTRIAXONE 100 MILLIGRAM(S): 500 INJECTION, POWDER, FOR SOLUTION INTRAMUSCULAR; INTRAVENOUS at 19:21

## 2022-01-01 RX ADMIN — LISINOPRIL 10 MILLIGRAM(S): 2.5 TABLET ORAL at 05:23

## 2022-01-01 RX ADMIN — Medication 5 MG/HR: at 13:18

## 2022-01-01 RX ADMIN — Medication 100 MILLIGRAM(S): at 19:48

## 2022-01-01 RX ADMIN — Medication 60 MILLIGRAM(S): at 17:02

## 2022-01-01 RX ADMIN — AMIODARONE HYDROCHLORIDE 400 MILLIGRAM(S): 400 TABLET ORAL at 17:40

## 2022-01-01 RX ADMIN — CEFTRIAXONE 100 MILLIGRAM(S): 500 INJECTION, POWDER, FOR SOLUTION INTRAMUSCULAR; INTRAVENOUS at 05:20

## 2022-01-01 RX ADMIN — Medication 100 MILLIGRAM(S): at 06:40

## 2022-01-01 RX ADMIN — APIXABAN 5 MILLIGRAM(S): 2.5 TABLET, FILM COATED ORAL at 21:16

## 2022-01-01 RX ADMIN — Medication 40 MILLIGRAM(S): at 16:16

## 2022-01-01 RX ADMIN — Medication 81 MILLIGRAM(S): at 12:02

## 2022-01-01 RX ADMIN — Medication 60 MILLIGRAM(S): at 13:01

## 2022-01-01 RX ADMIN — Medication 60 MILLIGRAM(S): at 05:39

## 2022-01-01 RX ADMIN — Medication 40 MILLIGRAM(S): at 05:51

## 2022-01-01 RX ADMIN — Medication 81 MILLIGRAM(S): at 12:34

## 2022-01-01 RX ADMIN — Medication 4 MILLILITER(S): at 14:59

## 2022-01-01 RX ADMIN — AMIODARONE HYDROCHLORIDE 400 MILLIGRAM(S): 400 TABLET ORAL at 05:38

## 2022-01-01 RX ADMIN — ENOXAPARIN SODIUM 70 MILLIGRAM(S): 100 INJECTION SUBCUTANEOUS at 18:52

## 2022-01-01 RX ADMIN — Medication 650 MILLIGRAM(S): at 19:25

## 2022-01-01 RX ADMIN — APIXABAN 5 MILLIGRAM(S): 2.5 TABLET, FILM COATED ORAL at 09:45

## 2022-01-01 RX ADMIN — ATORVASTATIN CALCIUM 80 MILLIGRAM(S): 80 TABLET, FILM COATED ORAL at 12:26

## 2022-01-01 RX ADMIN — ENOXAPARIN SODIUM 40 MILLIGRAM(S): 100 INJECTION SUBCUTANEOUS at 22:02

## 2022-01-01 RX ADMIN — ALBUTEROL 2.5 MILLIGRAM(S): 90 AEROSOL, METERED ORAL at 20:00

## 2022-01-01 RX ADMIN — AZITHROMYCIN 255 MILLIGRAM(S): 500 TABLET, FILM COATED ORAL at 05:03

## 2022-01-01 RX ADMIN — Medication 600 MILLIGRAM(S): at 05:31

## 2022-01-01 RX ADMIN — Medication 50 MILLIGRAM(S): at 18:14

## 2022-01-01 RX ADMIN — Medication 30 MILLIGRAM(S): at 00:19

## 2022-01-01 RX ADMIN — Medication 50 MILLIGRAM(S): at 05:35

## 2022-01-01 RX ADMIN — ATORVASTATIN CALCIUM 80 MILLIGRAM(S): 80 TABLET, FILM COATED ORAL at 12:05

## 2022-01-01 RX ADMIN — Medication 3 MILLILITER(S): at 09:34

## 2022-01-01 RX ADMIN — ATORVASTATIN CALCIUM 80 MILLIGRAM(S): 80 TABLET, FILM COATED ORAL at 21:49

## 2022-01-01 RX ADMIN — Medication 50 MILLIGRAM(S): at 05:27

## 2022-01-01 RX ADMIN — Medication 60 MILLIGRAM(S): at 12:18

## 2022-01-01 RX ADMIN — Medication 60 MILLIGRAM(S): at 06:40

## 2022-01-01 RX ADMIN — Medication 40 MILLIGRAM(S): at 17:00

## 2022-01-01 RX ADMIN — Medication 100 MILLIGRAM(S): at 18:13

## 2022-01-01 RX ADMIN — Medication 60 MILLIGRAM(S): at 13:39

## 2022-01-01 RX ADMIN — Medication 5 MG/HR: at 03:25

## 2022-01-01 RX ADMIN — ATORVASTATIN CALCIUM 80 MILLIGRAM(S): 80 TABLET, FILM COATED ORAL at 11:23

## 2022-01-01 RX ADMIN — PANTOPRAZOLE SODIUM 40 MILLIGRAM(S): 20 TABLET, DELAYED RELEASE ORAL at 05:39

## 2022-01-01 RX ADMIN — ATORVASTATIN CALCIUM 80 MILLIGRAM(S): 80 TABLET, FILM COATED ORAL at 22:35

## 2022-01-01 RX ADMIN — Medication 0.25 MILLIGRAM(S): at 22:02

## 2022-01-01 RX ADMIN — AZITHROMYCIN 255 MILLIGRAM(S): 500 TABLET, FILM COATED ORAL at 05:35

## 2022-01-01 RX ADMIN — Medication 3 MILLILITER(S): at 13:29

## 2022-01-01 RX ADMIN — Medication 120 MILLIGRAM(S): at 09:46

## 2022-01-01 RX ADMIN — Medication 3 MILLILITER(S): at 09:04

## 2022-01-01 RX ADMIN — AZITHROMYCIN 255 MILLIGRAM(S): 500 TABLET, FILM COATED ORAL at 05:20

## 2022-01-01 RX ADMIN — Medication 20 MILLIEQUIVALENT(S): at 09:40

## 2022-01-01 RX ADMIN — APIXABAN 5 MILLIGRAM(S): 2.5 TABLET, FILM COATED ORAL at 22:40

## 2022-01-01 RX ADMIN — Medication 5 MILLIGRAM(S): at 01:27

## 2022-01-01 RX ADMIN — AMIODARONE HYDROCHLORIDE 600 MILLIGRAM(S): 400 TABLET ORAL at 23:46

## 2022-01-01 RX ADMIN — SODIUM CHLORIDE 1000 MILLILITER(S): 9 INJECTION, SOLUTION INTRAVENOUS at 18:30

## 2022-01-01 RX ADMIN — Medication 60 MILLIGRAM(S): at 05:27

## 2022-01-01 RX ADMIN — Medication 125 MICROGRAM(S): at 05:27

## 2022-01-01 RX ADMIN — Medication 50 MILLIGRAM(S): at 11:26

## 2022-01-01 RX ADMIN — CHLORHEXIDINE GLUCONATE 1 APPLICATION(S): 213 SOLUTION TOPICAL at 05:39

## 2022-01-01 RX ADMIN — ATORVASTATIN CALCIUM 80 MILLIGRAM(S): 80 TABLET, FILM COATED ORAL at 22:40

## 2022-01-01 RX ADMIN — Medication 10 MILLIGRAM(S): at 03:24

## 2022-01-01 RX ADMIN — CEFTRIAXONE 100 MILLIGRAM(S): 500 INJECTION, POWDER, FOR SOLUTION INTRAMUSCULAR; INTRAVENOUS at 04:45

## 2022-01-01 RX ADMIN — CEFTRIAXONE 100 MILLIGRAM(S): 500 INJECTION, POWDER, FOR SOLUTION INTRAMUSCULAR; INTRAVENOUS at 05:39

## 2022-01-01 RX ADMIN — ENOXAPARIN SODIUM 70 MILLIGRAM(S): 100 INJECTION SUBCUTANEOUS at 05:29

## 2022-01-01 RX ADMIN — Medication 0.5 MILLIGRAM(S): at 17:57

## 2022-01-01 RX ADMIN — Medication 100 MILLIGRAM(S): at 07:00

## 2022-01-01 RX ADMIN — Medication 650 MILLIGRAM(S): at 06:31

## 2022-01-01 RX ADMIN — AMIODARONE HYDROCHLORIDE 200 MILLIGRAM(S): 400 TABLET ORAL at 06:04

## 2022-01-01 RX ADMIN — CHLORHEXIDINE GLUCONATE 1 APPLICATION(S): 213 SOLUTION TOPICAL at 06:58

## 2022-01-01 RX ADMIN — APIXABAN 5 MILLIGRAM(S): 2.5 TABLET, FILM COATED ORAL at 09:37

## 2022-01-01 RX ADMIN — Medication 60 MILLIGRAM(S): at 05:21

## 2022-01-01 RX ADMIN — ATORVASTATIN CALCIUM 80 MILLIGRAM(S): 80 TABLET, FILM COATED ORAL at 21:16

## 2022-01-01 RX ADMIN — Medication 10 MILLIGRAM(S): at 01:20

## 2022-01-01 RX ADMIN — Medication 100 MILLIGRAM(S): at 17:40

## 2022-01-01 RX ADMIN — Medication 650 MILLIGRAM(S): at 18:30

## 2022-01-01 RX ADMIN — Medication 30 MILLIGRAM(S): at 05:51

## 2022-01-01 RX ADMIN — PANTOPRAZOLE SODIUM 40 MILLIGRAM(S): 20 TABLET, DELAYED RELEASE ORAL at 06:40

## 2022-01-01 RX ADMIN — Medication 5 MILLIGRAM(S): at 15:45

## 2022-01-01 RX ADMIN — Medication 50 MILLIGRAM(S): at 09:07

## 2022-01-01 RX ADMIN — APIXABAN 5 MILLIGRAM(S): 2.5 TABLET, FILM COATED ORAL at 09:08

## 2022-01-01 RX ADMIN — ENOXAPARIN SODIUM 40 MILLIGRAM(S): 100 INJECTION SUBCUTANEOUS at 21:24

## 2022-01-01 RX ADMIN — Medication 60 MILLIGRAM(S): at 21:42

## 2022-01-01 RX ADMIN — Medication 81 MILLIGRAM(S): at 12:55

## 2022-01-01 RX ADMIN — Medication 60 MILLIGRAM(S): at 07:00

## 2022-01-01 RX ADMIN — PANTOPRAZOLE SODIUM 40 MILLIGRAM(S): 20 TABLET, DELAYED RELEASE ORAL at 07:00

## 2022-01-01 RX ADMIN — CHLORHEXIDINE GLUCONATE 1 APPLICATION(S): 213 SOLUTION TOPICAL at 06:20

## 2022-01-01 RX ADMIN — PANTOPRAZOLE SODIUM 40 MILLIGRAM(S): 20 TABLET, DELAYED RELEASE ORAL at 05:22

## 2022-01-01 RX ADMIN — Medication 100 MILLIGRAM(S): at 09:38

## 2022-01-01 RX ADMIN — Medication 5 MG/HR: at 18:26

## 2022-01-01 RX ADMIN — Medication 81 MILLIGRAM(S): at 13:36

## 2022-01-01 RX ADMIN — ALBUTEROL 2.5 MILLIGRAM(S): 90 AEROSOL, METERED ORAL at 09:16

## 2022-01-01 RX ADMIN — ENOXAPARIN SODIUM 70 MILLIGRAM(S): 100 INJECTION SUBCUTANEOUS at 05:03

## 2022-01-01 RX ADMIN — PANTOPRAZOLE SODIUM 40 MILLIGRAM(S): 20 TABLET, DELAYED RELEASE ORAL at 06:04

## 2022-01-01 RX ADMIN — ATORVASTATIN CALCIUM 80 MILLIGRAM(S): 80 TABLET, FILM COATED ORAL at 22:06

## 2022-01-01 RX ADMIN — ATORVASTATIN CALCIUM 80 MILLIGRAM(S): 80 TABLET, FILM COATED ORAL at 21:20

## 2022-01-01 RX ADMIN — Medication 0.25 MILLIGRAM(S): at 17:46

## 2022-01-01 RX ADMIN — Medication 100 MILLIGRAM(S): at 06:03

## 2022-01-01 RX ADMIN — CEFTRIAXONE 100 MILLIGRAM(S): 500 INJECTION, POWDER, FOR SOLUTION INTRAMUSCULAR; INTRAVENOUS at 06:11

## 2022-01-01 RX ADMIN — ATORVASTATIN CALCIUM 80 MILLIGRAM(S): 80 TABLET, FILM COATED ORAL at 12:02

## 2022-01-01 RX ADMIN — ALBUTEROL 2.5 MILLIGRAM(S): 90 AEROSOL, METERED ORAL at 08:50

## 2022-01-01 RX ADMIN — Medication 3 MILLILITER(S): at 21:05

## 2022-01-01 RX ADMIN — Medication 4 MILLILITER(S): at 08:51

## 2022-01-01 RX ADMIN — ALBUTEROL 2.5 MILLIGRAM(S): 90 AEROSOL, METERED ORAL at 08:45

## 2022-01-01 RX ADMIN — ENOXAPARIN SODIUM 40 MILLIGRAM(S): 100 INJECTION SUBCUTANEOUS at 19:50

## 2022-01-01 RX ADMIN — AMIODARONE HYDROCHLORIDE 16.7 MG/MIN: 400 TABLET ORAL at 06:15

## 2022-01-01 RX ADMIN — AZITHROMYCIN 255 MILLIGRAM(S): 500 TABLET, FILM COATED ORAL at 19:21

## 2022-01-01 RX ADMIN — ENOXAPARIN SODIUM 70 MILLIGRAM(S): 100 INJECTION SUBCUTANEOUS at 06:40

## 2022-01-01 RX ADMIN — Medication 60 MILLIGRAM(S): at 13:18

## 2022-01-01 RX ADMIN — LISINOPRIL 10 MILLIGRAM(S): 2.5 TABLET ORAL at 05:26

## 2022-01-01 RX ADMIN — ALBUTEROL 2.5 MILLIGRAM(S): 90 AEROSOL, METERED ORAL at 15:58

## 2022-01-01 RX ADMIN — SODIUM CHLORIDE 250 MILLILITER(S): 9 INJECTION INTRAMUSCULAR; INTRAVENOUS; SUBCUTANEOUS at 17:12

## 2022-01-01 RX ADMIN — Medication 650 MILLIGRAM(S): at 21:30

## 2022-01-01 RX ADMIN — SODIUM CHLORIDE 50 MILLILITER(S): 9 INJECTION, SOLUTION INTRAVENOUS at 05:32

## 2022-01-01 RX ADMIN — Medication 10 MILLIGRAM(S): at 08:53

## 2022-01-01 RX ADMIN — CHLORHEXIDINE GLUCONATE 1 APPLICATION(S): 213 SOLUTION TOPICAL at 06:09

## 2022-01-01 RX ADMIN — Medication 50 MILLIGRAM(S): at 18:52

## 2022-01-01 RX ADMIN — Medication 40 MILLIGRAM(S): at 05:20

## 2022-01-01 RX ADMIN — ENOXAPARIN SODIUM 40 MILLIGRAM(S): 100 INJECTION SUBCUTANEOUS at 21:32

## 2022-01-01 RX ADMIN — APIXABAN 5 MILLIGRAM(S): 2.5 TABLET, FILM COATED ORAL at 06:04

## 2022-01-01 RX ADMIN — ATORVASTATIN CALCIUM 80 MILLIGRAM(S): 80 TABLET, FILM COATED ORAL at 22:17

## 2022-01-01 RX ADMIN — ENOXAPARIN SODIUM 70 MILLIGRAM(S): 100 INJECTION SUBCUTANEOUS at 17:22

## 2022-01-01 RX ADMIN — Medication 3 MILLILITER(S): at 13:42

## 2022-01-01 RX ADMIN — LISINOPRIL 10 MILLIGRAM(S): 2.5 TABLET ORAL at 05:31

## 2022-01-01 RX ADMIN — Medication 5 MILLIGRAM(S): at 22:29

## 2022-01-01 RX ADMIN — AMIODARONE HYDROCHLORIDE 33.3 MG/MIN: 400 TABLET ORAL at 00:06

## 2022-01-01 RX ADMIN — Medication 60 MILLIGRAM(S): at 17:18

## 2022-01-01 RX ADMIN — APIXABAN 5 MILLIGRAM(S): 2.5 TABLET, FILM COATED ORAL at 22:17

## 2022-01-01 RX ADMIN — CHLORHEXIDINE GLUCONATE 1 APPLICATION(S): 213 SOLUTION TOPICAL at 05:08

## 2022-01-01 RX ADMIN — Medication 600 MILLIGRAM(S): at 06:03

## 2022-01-01 RX ADMIN — CHLORHEXIDINE GLUCONATE 1 APPLICATION(S): 213 SOLUTION TOPICAL at 05:30

## 2022-01-01 RX ADMIN — Medication 50 MILLIGRAM(S): at 23:16

## 2022-01-01 RX ADMIN — CHLORHEXIDINE GLUCONATE 1 APPLICATION(S): 213 SOLUTION TOPICAL at 06:59

## 2022-01-01 RX ADMIN — Medication 25 MILLIGRAM(S): at 06:03

## 2022-01-01 RX ADMIN — ENOXAPARIN SODIUM 70 MILLIGRAM(S): 100 INJECTION SUBCUTANEOUS at 18:13

## 2022-01-01 RX ADMIN — ATORVASTATIN CALCIUM 80 MILLIGRAM(S): 80 TABLET, FILM COATED ORAL at 21:42

## 2022-01-01 RX ADMIN — LISINOPRIL 10 MILLIGRAM(S): 2.5 TABLET ORAL at 17:14

## 2022-01-01 RX ADMIN — ATORVASTATIN CALCIUM 80 MILLIGRAM(S): 80 TABLET, FILM COATED ORAL at 21:27

## 2022-01-01 RX ADMIN — Medication 0.25 MILLIGRAM(S): at 17:14

## 2022-01-01 RX ADMIN — Medication 600 MILLIGRAM(S): at 19:26

## 2022-01-01 RX ADMIN — Medication 250 MICROGRAM(S): at 14:06

## 2022-01-01 RX ADMIN — SODIUM CHLORIDE 500 MILLILITER(S): 9 INJECTION, SOLUTION INTRAVENOUS at 15:45

## 2022-01-01 RX ADMIN — ATORVASTATIN CALCIUM 80 MILLIGRAM(S): 80 TABLET, FILM COATED ORAL at 23:02

## 2022-01-01 RX ADMIN — Medication 30 MILLIGRAM(S): at 18:02

## 2022-01-01 RX ADMIN — ATORVASTATIN CALCIUM 80 MILLIGRAM(S): 80 TABLET, FILM COATED ORAL at 12:55

## 2022-01-01 RX ADMIN — Medication 3 MILLILITER(S): at 07:36

## 2022-01-01 RX ADMIN — ENOXAPARIN SODIUM 40 MILLIGRAM(S): 100 INJECTION SUBCUTANEOUS at 17:13

## 2022-01-01 RX ADMIN — LISINOPRIL 10 MILLIGRAM(S): 2.5 TABLET ORAL at 05:27

## 2022-01-01 RX ADMIN — Medication 4 MILLILITER(S): at 08:00

## 2022-01-01 RX ADMIN — Medication 50 MILLIGRAM(S): at 14:43

## 2022-01-01 RX ADMIN — ALBUTEROL 2.5 MILLIGRAM(S): 90 AEROSOL, METERED ORAL at 13:47

## 2022-01-01 RX ADMIN — Medication 25 MILLIGRAM(S): at 12:52

## 2022-01-01 RX ADMIN — Medication 10 MILLIGRAM(S): at 01:15

## 2022-01-01 RX ADMIN — AMIODARONE HYDROCHLORIDE 400 MILLIGRAM(S): 400 TABLET ORAL at 05:22

## 2022-01-01 RX ADMIN — ENOXAPARIN SODIUM 70 MILLIGRAM(S): 100 INJECTION SUBCUTANEOUS at 06:59

## 2022-01-01 RX ADMIN — AMIODARONE HYDROCHLORIDE 400 MILLIGRAM(S): 400 TABLET ORAL at 18:00

## 2022-01-01 RX ADMIN — AMIODARONE HYDROCHLORIDE 400 MILLIGRAM(S): 400 TABLET ORAL at 07:00

## 2022-01-01 RX ADMIN — ALBUTEROL 2.5 MILLIGRAM(S): 90 AEROSOL, METERED ORAL at 19:27

## 2022-01-01 RX ADMIN — Medication 650 MILLIGRAM(S): at 05:51

## 2022-01-01 RX ADMIN — Medication 60 MILLIGRAM(S): at 05:04

## 2022-01-01 RX ADMIN — Medication 100 MILLIGRAM(S): at 17:20

## 2022-01-01 RX ADMIN — Medication 5 MILLIGRAM(S): at 13:11

## 2022-01-01 RX ADMIN — AZITHROMYCIN 255 MILLIGRAM(S): 500 TABLET, FILM COATED ORAL at 06:21

## 2022-01-01 RX ADMIN — Medication 100 MILLIGRAM(S): at 05:22

## 2022-01-01 RX ADMIN — Medication 5 MILLIGRAM(S): at 07:34

## 2022-01-01 RX ADMIN — Medication 60 MILLIGRAM(S): at 21:19

## 2022-05-02 NOTE — H&P ADULT - HISTORY OF PRESENT ILLNESS
Mr. Escobedo is a 85yo b/l [place] M w/ PMHx of HTN, HLD, Chronic Vertigo, Pre-DM and Carotid artery stenosis s/p L Carotid endarterectomy BIBA alerted by son for Veritgo and feeling unwell. As per son Pt was feeling dizzy yesterday afternoon w/ associated weakness. Symptoms was resolved after taking his Meclizine. This morning symptoms return associated w/ chills, nausea and coughing up tick clear phlegm. Pt told his son he was unwell and need to go to the hospital. Pt expressed that this is the first time that he felt like this along with his dizziness. He has been experiencing dizziness about 2 years ago and was followed at the [facility]. Pt is unsure of any vertigo work up other than being prescribed meclizine. He reported that he would experience dizziness at anytime while sitting. He described his dizziness as the room spinning. He reported no episode of aggravationa dn symptoms ususally relieved with meclizine. He denies h/o head trauma, HA/Migraine, seizure, visual/auditory changes, h/o fall, chest pain/palpitation, recent illness, cough, GI/ changes.     Son reported that he recently saw his PMD and was dx w/ Pre-DM.   Pt also lost his youngest son last week to cardiac infarction

## 2022-05-02 NOTE — H&P ADULT - NSHPLABSRESULTS_GEN_ALL_CORE
LABS:                        11.8   6.50  )-----------( 234      ( 02 May 2022 04:50 )             34.4     05-02    132<L>  |  98  |  10  ----------------------------<  112<H>  4.2   |  26  |  0.7    Ca    8.9      02 May 2022 04:50    TPro  6.3  /  Alb  3.4<L>  /  TBili  0.6  /  DBili  x   /  AST  20  /  ALT  15  /  AlkPhos  84  05-02        < from: Xray Chest 1 View- PORTABLE-Urgent (05.02.22 @ 05:29) >    Cardiac/mediastinum/hilum: There is aleft hilar masslike opacity    Lung parenchyma/Pleura: Left interstitial opacities    Skeleton/soft tissues: Degenerative change    Impression:    Left hilar masslike opacity    Further evaluation with a postcontrast chest CT is recommended to   evaluate for the possibility of neoplasm    < end of copied text >    < from: CT Head No Cont (05.02.22 @ 05:10) >    No evidence of intracranial hemorrhage, territorial infarct, or mass   effect.    < end of copied text >    < from: US Duplex Carotid Arteries Complete, Bilateral (04.30.22 @ 10:31) >    IMPRESSION:    Extensive bilateral common carotid arterial atherosclerotic plaque with a   markedly elevated velocities near complete occlusion of left carotid bulb.    Decreased velocities involving bilateral internal carotid arteries.    Measurement of carotid stenosis is based on velocity parameters that   correlate the residual internal carotid diameter with that of the more   distal vessel in accordance with a method such as the [place] American   Symptomatic Carotid Endarterectomy Trial (NASCET).    < end of copied text >

## 2022-05-02 NOTE — ED PROVIDER NOTE - CLINICAL SUMMARY MEDICAL DECISION MAKING FREE TEXT BOX
87 yo M, hx of HTN, HLD, vertigo here for assessment of persistent room spinning dizziness not resolved with meclizine at home. Denied fever, chills, vomiting, vision change, gait abnormalities, CP, cough however in ED found to be febrile with L sided rales.     Labs unremarkable, CT head without acute pathology, CXR consistent with L sided PNA.    Fever, PNA likely contributing to vertiginous sx, was treated with ceftriaxone and azithro, will admit.

## 2022-05-02 NOTE — H&P ADULT - NSICDXPASTSURGICALHX_GEN_ALL_CORE_FT
PAST SURGICAL HISTORY:  H/O umbilical hernia repair     History of CEA (carotid endarterectomy)     S/P cataract surgery

## 2022-05-02 NOTE — ED PROVIDER NOTE - PHYSICAL EXAMINATION
Constitutional: Well developed, well nourished. NAD  Head: Normocephalic, atraumatic.  Eyes: PERRL, EOMI.  ENT: No nasal discharge. Mucous membranes dry.  Neck: Supple. Painless ROM.  Cardiovascular:  Regular rate and rhythm.    Pulmonary:   + bibasilar rales;   Abdominal: Soft. Nondistended. No rebound, guarding, rigidity.  Extremities. Pelvis stable. + edema bilat   Skin: No rashes, cyanosis.  Neuro: AAOx3. No focal neurological deficits.  Psych: Normal mood. Normal affect.

## 2022-05-02 NOTE — ED ADULT NURSE NOTE - OBJECTIVE STATEMENT
pt is 85 y/o male presenting to ED for c/o dizziness. pt states he has a history of vertigo. pt denies headache, one sided numbness, tingling, weakness, slurred speech; pt  denies chest, back or abdominal pain;

## 2022-05-02 NOTE — ED ADULT NURSE NOTE - BEFAST EYES
1. Dr. Benja Miller in [facility] per Dr. Kaylee Nye recommendation, at your discretion. 2.  Fasting labs, to address your concerns about weight gain, progressive numbness in your foot, concerns about your estrogen dose. Update thyroid cholesterol sugar kidney function  3. Due for mammogram  4. EMG nerve conduction study to test the nerves in the left arm and right foot.   5.  Estrogen was refilled April 6, I would wait until you are back on the estrogen for 7 to 10 days before doing the blood test.  6.  Prednisone 40 mg for 5 days
No

## 2022-05-02 NOTE — ED PROVIDER NOTE - OBJECTIVE STATEMENT
86 yold male to ED Pmhx Vertigo, Htn, Hld, L Cea c/o n/v, dizziness described as room spinning started yesterday; pt attempted his meclizine without improvement; sx similar to prior episodes of dizziness but more persietnet;  pt denies headache, one sided numbness, tingling, weakness, slurred speech; pt  denies chest, back or abdominal pain;

## 2022-05-02 NOTE — H&P ADULT - ASSESSMENT
Mr. Escobedo is a 85yo b/l [place] M w/ PMHx of HTN, HLD, Chronic Vertigo, Pre-DM and Carotid artery stenosis s/p L Carotid endarterectomy BIBA alerted by son for Veritgo and feeling unwell.      #Chronic Vertigo  - currently on Meclizine ---  cont 25mg Q8 PRN  - Needs Outpt [facility] to r/o causes of vertigo  - Needs outpt Neurology f/u   - PT    #Incidental Lung pathology on CT  - Need to discuss findings w/ pt for possible w/u  - no signs of PNA or indication for Abx  - Pulmonology Consult    DVT ppx: Lovenox 40mg SC QD  GI ppx: N/A  Diet: DASH/TLC  Activity: IAT  Dispo: Home if pt does not want to w/u lung findings     Mr. Escobedo is a 85yo b/l [place] M w/ PMHx of HTN, HLD, Chronic Vertigo, Pre-DM and Carotid artery stenosis s/p L Carotid endarterectomy BIBA alerted by son for Veritgo and feeling unwell.    #Pt sons  is this Wednesday and he would like to attend      #Chronic Vertigo  - currently on Meclizine ---  cont 25mg Q8 PRN  - Needs Outpt [facility] to r/o causes of vertigo  - Needs outpt Neurology f/u   - PT    #Incidental Lung pathology on CT  - Need to discuss findings w/ pt for possible w/u  - no signs of PNA or indication for Abx  - Pulmonology Consult    #Normocytic Anemia --- will cont to monitor    #Hyponatremia  - poor appetite and oral intake  - encourage oral intake   - RD consult    DVT ppx: Lovenox 40mg SC QD  GI ppx: N/A  Diet: DASH/TLC  Activity: IAT  Dispo: Home if pt does not want to w/u lung findings     Mr. Escobedo is a 85yo b/l [place] M w/ PMHx of HTN, HLD, Chronic Vertigo, Pre-DM and Carotid artery stenosis s/p L Carotid endarterectomy BIBA alerted by son for Veritgo and feeling unwell.    #Pt sons  is this Wednesday and he would like to attend      #Chronic Vertigo  - currently on Meclizine ---  cont 25mg Q8 PRN  - Needs Outpt [facility] to r/o causes of vertigo  - Needs outpt Neurology f/u   - PT    #Incidental Lung pathology on CT  - Need to discuss findings w/ pt for possible w/u  - no signs of PNA or indication for Abx  - Pulmonology Consult    #Cardiac Murmur --- more pronounced un Pulmonic region  #h/o Peripheral edema  - mildly elevated pBNP  - was taking furosemide up until 2 weeks ago and was encouraged ANTOINETTE stockings by cardio  - may need and TTE    #Normocytic Anemia --- will cont to monitor    #Hyponatremia  - poor appetite and oral intake  - encourage oral intake   - RD consult    DVT ppx: Lovenox 40mg SC QD  GI ppx: N/A  Diet: DASH/TLC  Activity: IAT  Dispo: Home if pt does not want to w/u lung findings

## 2022-05-02 NOTE — H&P ADULT - NSHPPHYSICALEXAM_GEN_ALL_CORE
LOS:     VITALS:   T(C): 36.1 (05-02-22 @ 08:00), Max: 37.9 (05-02-22 @ 04:17)  HR: 72 (05-02-22 @ 08:00) (72 - 85)  BP: 160/71 (05-02-22 @ 08:00) (158/75 - 160/71)  RR: 20 (05-02-22 @ 08:00) (18 - 20)  SpO2: 100% (05-02-22 @ 08:00) (99% - 100%)    GENERAL: NAD, sitting up in bed comfortably  HEAD:  ERYTHEMATOUS Atraumatic, Normocephalic  EYES: EOMI W/OUT NYSTAGMUS, PERRLA, conjunctiva and sclera clear  ENT: Moist mucous membranes  NECK: Supple, No JVD  CHEST/LUNG: Poor LL Auscultatory sound w/ dullness to percussion. RL Clear to auscultation; No rales, rhonchi, wheezing, or rubs. Unlabored respirations  HEART: Murmur more pronounced in pulmonic region. Regular rate and rhythm;   ABDOMEN: BSx4; Soft, nontender, nondistended  EXTREMITIES:  cold distal LE b/l. 1+ pitting edema b/l LE foot to mid shin. 2+ Peripheral Pulses, brisk capillary refill. No clubbing, cyanosis   NERVOUS SYSTEM:  A&Ox3, no focal deficits   SKIN: warm, rubor of the R distal calf, onychomycosis of all UE and LE digits.

## 2022-05-02 NOTE — ED ADULT TRIAGE NOTE - WEIGHT IN LBS
Attempted to contact patient for phone follow up regarding VGO start on 12/11/20. Left patient a message with contact number. Noted patient is currently admitted to hospital at this time.    
160

## 2022-05-02 NOTE — H&P ADULT - ATTENDING COMMENTS
86 yr old male with hx of HTN, HLD, Chronic Vertigo, Pre-DM and Carotid artery stenosis s/p L Carotid endarterectomy BIBA alerted by son for Veritgo and feeling unwell.    # Vertigo   # Carotid artery stenosis s/p L Carotid endarterectomy  # Incidental Left Hilar Mass  - XR chest: Left hilar masslike opacity  - CT Head No Cont (05.02.22): No evidence of intracranial hemorrhage, territorial infarct, or mass effect.  - US Duplex Carotid Arteries Complete, Bilateral (04.30.22): Extensive bilateral common carotid arterial atherosclerotic plaque with a markedly elevated velocities near complete occlusion of left carotid bulb.  - c/w meclizine  - check TSH, AIC  - check orthostatics   - check echo  - left hilar mass --> outpt workup   - vascular consult   - neurology consult     DVT ppx  - start Lovenox 40mg SC QD    # Full code    **Pt seen on 05/02/22 GENERAL: NAD, well-developed, Non-toxic, stated age   HEAD:  Atraumatic, Normocephalic  EYES: EOMI, Sclera White   NECK: Supple, No JVD  CHEST/LUNG: clear b/l breath sounds; No wheezing, rhonchi, or crackles  HEART: Regular rate and rhythm; s1, s2, systolic murmur  ABDOMEN: Soft, Nontender, Nondistended; Bowel sounds present, No rebound or guarding noted   EXTREMITIES:  No lower extremity edema or calf tenderness to palpation.  No clubbing or cyanosis  PSYCH: AAOx3, pleasant, cooperative, not anxious  NEUROLOGY: CN intact, 5/5 strength in all extremities, Sensation grossly intact.   SKIN: No rashes or lesions      86 yr old male with hx of HTN, HLD, Chronic Vertigo, Pre-DM and Carotid artery stenosis s/p L Carotid endarterectomy admitted for generalized weakness and vertigo. Patient states having vertigo for years and take meclizine for relief. Patient woke up at 3AM with dizziness and felt very weak. So son call ems.     # Vertigo   # Carotid artery stenosis s/p L Carotid endarterectomy  # Incidental Left Hilar Mass  - NIHSS: 0 at encounter   - CT Head No Cont (05.02.22): No evidence of intracranial hemorrhage, territorial infarct, or mass effect.  - XR chest: Left hilar masslike opacity  - US Duplex Carotid Arteries Complete, Bilateral (04.30.22): Extensive bilateral common carotid arterial atherosclerotic plaque with a markedly elevated velocities near complete occlusion of left carotid bulb.  - c/w meclizine  - check TSH, AIC  - check orthostatics   - check echo  - left hilar mass --> outpt workup   - vascular consult   - neurology consult     # DVT ppx  - start Lovenox    # Full code    **Pt seen on 05/02/22

## 2022-05-02 NOTE — ED PROVIDER NOTE - NS ED ATTENDING STATEMENT MOD
111 I have seen and examined this patient and fully participated in the care of this patient as the teaching attending.  The service was shared with the DAMIAN.  I reviewed and verified the documentation and independently performed the documented:

## 2022-05-02 NOTE — H&P ADULT - NSICDXFAMILYHX_GEN_ALL_CORE_FT
FAMILY HISTORY:  Father  Still living? No  FH: lung cancer, Age at diagnosis: Age Unknown    Mother  Still living? No  FH: HTN (hypertension), Age at diagnosis: Age Unknown    Child  Still living? No  FHx: myocardial infarction, Age at diagnosis: Age Unknown

## 2022-05-02 NOTE — H&P ADULT - NSHPREVIEWOFSYSTEMS_GEN_ALL_CORE
REVIEW OF SYSTEMS:    CONSTITUTIONAL: CHILLS. No weakness, fevers   EYES/ENT: VERTIGO. No visual changes;  throat pain   NECK: No pain or stiffness  RESPIRATORY: No cough, wheezing, hemoptysis; No shortness of breath  CARDIOVASCULAR: No chest pain or palpitations  GASTROINTESTINAL: NAUSEA. No abdominal or epigastric pain. No vomiting, or hematemesis; No diarrhea or constipation. No melena or hematochezia.  GENITOURINARY: No dysuria, frequency or hematuria  NEUROLOGICAL: No numbness or weakness  SKIN: No itching, rashes

## 2022-05-02 NOTE — H&P ADULT - NSHPSOCIALHISTORY_GEN_ALL_CORE
Retired transit  of 23yrs  Smoking cessation 40yrs ago  Social EtOH user -- last drink was last week   Lives in [place] and recently staying w/ son on [place]

## 2022-05-03 NOTE — PHYSICAL THERAPY INITIAL EVALUATION ADULT - PERTINENT HX OF CURRENT PROBLEM, REHAB EVAL
Mr. Escobedo is a 87yo b/l [place] M w/ PMHx of HTN, HLD, Chronic Vertigo, Pre-DM and Carotid artery stenosis s/p L Carotid endarterectomy BIBA alerted by son for Veritgo and feeling unwell

## 2022-05-03 NOTE — CONSULT NOTE ADULT - ASSESSMENT
IMPRESSION:      PLAN: IMPRESSION:    MARILEE Mass with possible Lymphangitic spread  Mass Effect on MARILEE and LLL with Atelectasis   Mediastinal Adenopathy   HO CAD    PLAN:    Will discuss need for Bronchoscopy with Transbronchial Biopsy and/or stenting   Pulmonary toilet  Aspiration precautions  HOB 45  Incentive spirometry   IMPRESSION:    MARILEE Mass with possible Lymphangitic spread  Mass Effect on MARILEE and LLL with Atelectasis   Mediastinal Adenopathy   HO CAD    PLAN:    Will discuss need for Bronchoscopy with Transbronchial Biopsy and/or stenting   Consider CT Abdomen/Pelvis   Pulmonary toilet  Aspiration precautions  HOB 45  Incentive spirometry   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  Can be done as OP   Aspiration precautions  HOB 45  Incentive spirometry  DVT prophylaxis  Dimer   Can DC home from pulmonary stand point

## 2022-05-03 NOTE — PHYSICAL THERAPY INITIAL EVALUATION ADULT - DISCHARGE DISPOSITION, PT EVAL
pt care assumed at 0230, no apparent distress noted at this time. pt received in yellow gown, ALOOB, Alert and Oriented to person, place, situation and time laying in bed tearful. pt c/o anxiety that got worsened today. pt states "she has a lot of stuff going on her in life" that is causing her anxiety. pt states she drinks alcohol to help calm her. HR is regular, lung sounds are clear b/l, abd is soft and nontender with positive bowel sounds in all four quadrants, skin is warm, dry and appropriate for age and race. plan of care explained, will continue to monitor. home w/ home PT

## 2022-05-03 NOTE — PATIENT PROFILE ADULT - FALL HARM RISK - HARM RISK INTERVENTIONS
Assistance with ambulation/Assistance OOB with selected safe patient handling equipment/Communicate Risk of Fall with Harm to all staff/Discuss with provider need for PT consult/Monitor gait and stability/Provide patient with walking aids - walker, cane, crutches/Reinforce activity limits and safety measures with patient and family/Sit up slowly, dangle for a short time, stand at bedside before walking/Tailored Fall Risk Interventions/Visual Cue: Yellow wristband and red socks/Bed in lowest position, wheels locked, appropriate side rails in place/Call bell, personal items and telephone in reach/Instruct patient to call for assistance before getting out of bed or chair/Non-slip footwear when patient is out of bed/[place] to call system/Physically safe environment - no spills, clutter or unnecessary equipment/Purposeful Proactive Rounding/Room/bathroom lighting operational, light cord in reach

## 2022-05-03 NOTE — PROGRESS NOTE ADULT - SUBJECTIVE AND OBJECTIVE BOX
KEIKO SALDAÑA MRN#: [id number]  CODE STATUS: FULL CODE     SUBJECTIVE   Chief complaint: Worsening Veritgo x 1 day    Currently admitted to medicine with the primary diagnosis of PNEUMONIA VERTIGO      Today is hospital day 1d,   INTERVAL HPI/OVERNIGHT EVENTS: No acute events overnight    This morning, he is laying in bed comfortably. Denies chest pain, shortness of breath, abdominal pain, nausea, vomiting or changes in bowel habits. He has no complaints today.     Urinating and stooling appropriately.    Present Today:           Zaman Catheter (x)No/ ()Yes?   Indication:             Central Line (x)No/ ()Yes?  Indication:          IV Fluids (x)No/ ()Yes?  Indication:     OBJECTIVE  PAST MEDICAL & SURGICAL HISTORY  Vertigo    HTN (hypertension)    HLD (hyperlipidemia)    History of CEA (carotid endarterectomy)    S/P cataract surgery    H/O umbilical hernia repair      ALLERGIES:  No Known Allergies    HOME MEDICATIONS:  Home Medications:  atorvastatin 80 mg oral tablet: 1 tab(s) orally once a day (02 May 2022 15:59)  meclizine 25 mg oral tablet, chewable: 1 tab(s) orally 3 times a day, As Needed (02 May 2022 15:59)  ramipril 2.5 mg oral capsule: 1 cap(s) orally once a day (02 May 2022 15:59)    MEDICATIONS:  STANDING MEDICATIONS  MEDICATIONS  (STANDING):  atorvastatin 80 milliGRAM(s) Oral daily  enoxaparin Injectable 40 milliGRAM(s) SubCutaneous every 24 hours  lisinopril 10 milliGRAM(s) Oral daily    PRN MEDICATIONS  MEDICATIONS  (PRN):  guaiFENesin  milliGRAM(s) Oral every 12 hours PRN Cough  meclizine 25 milliGRAM(s) Oral three times a day PRN Dizziness      VITAL SIGNS  T(F): 97.5 (05-03 @ 06:07), Max: 97.6 (05-02 @ 13:17)  HR: 62 (05-03 @ 06:07) (62 - 90)  BP: 116/64 (05-03 @ 06:07) (116/64 - 183/79)  RR: 19 (05-03 @ 06:07) (18 - 20)  SpO2: 96% (05-03 @ 03:49) (96% - 97%)    LABS:                        11.2   6.27  )-----------( 246      ( 03 May 2022 07:40 )             34.5     05-03    133<L>  |  98  |  8<L>  ----------------------------<  92  4.3   |  27  |  0.8    Ca    8.9      03 May 2022 07:40  Phos  3.5     05-03  Mg     1.9     05-03    TPro  6.3  /  Alb  3.4<L>  /  TBili  0.6  /  DBili  x   /  AST  20  /  ALT  15  /  AlkPhos  84  05-02          CARDIAC MARKERS ( 02 May 2022 04:50 )  x     / <0.01 ng/mL / x     / x     / x            RADIOLOGY:   Reviewed    < from: US Duplex Carotid Arteries Complete, Bilateral (04.30.22 @ 10:31) >  Extensive bilateral common carotid arterial atherosclerotic plaque with a   markedly elevated velocities near complete occlusion of left carotid bulb.    Decreased velocities involving bilateral internal carotid arteries.    < end of copied text >    < from: CT Head No Cont (05.02.22 @ 05:10) >  No evidence of intracranial hemorrhage, territorial infarct, or mass   effect.    < end of copied text >    < from: CT Chest w/ IV Cont (05.02.22 @ 13:57) >  Ill-defined masslike structure within the left upper lung contiguous with   the mediastinum and left hilum. Mass effect upon the left upper lobe and   left lower lobe bronchi with areas of atelectasis. Mass extends to the   posterior aspect of the left chest wall. Atelectasis within the right   lower lung. Subcentimeter mediastinal adenopathy.    < end of copied text >      PHYSICAL EXAM:  General: Comfortably laying on the hospital bed. NAD. AAOx3. [place]  HEENT: Atraumatic. Normocephalic. EOMI. Conjunctiva and sclera clear.  Cardiac: Normal S1, S2. RRR. No murmurs, rubs, or gallops.  Pulm: CTA b/l no wheezes, rhonchi, or rales.  GI: Soft, nontender, nondistended.  Ext: 2+ pulses. Moving all 4 extremities. No edema, cyanosis.  Neuro: CN II-XII grossly intact except [place]  Skin: No lesions or rashes   KEIKO SALDAÑA MRN#: [id number]  CODE STATUS: FULL CODE     SUBJECTIVE   Chief complaint: Worsening Veritgo x 1 day    Currently admitted to medicine with the primary diagnosis of PNEUMONIA VERTIGO      Today is hospital day 1d,   INTERVAL HPI/OVERNIGHT EVENTS: No acute events overnight    This morning, he is laying in bed comfortably. Reports improvement in his vertigo this morning, currently asymptomatic. Ambulated with PT. Tolerating PO.    Urinating and stooling appropriately.    Present Today:           Zaman Catheter (x)No/ ()Yes?   Indication:             Central Line (x)No/ ()Yes?  Indication:          IV Fluids (x)No/ ()Yes?  Indication:     OBJECTIVE  PAST MEDICAL & SURGICAL HISTORY  Vertigo    HTN (hypertension)    HLD (hyperlipidemia)    History of CEA (carotid endarterectomy)    S/P cataract surgery    H/O umbilical hernia repair      ALLERGIES:  No Known Allergies    HOME MEDICATIONS:  Home Medications:  atorvastatin 80 mg oral tablet: 1 tab(s) orally once a day (02 May 2022 15:59)  meclizine 25 mg oral tablet, chewable: 1 tab(s) orally 3 times a day, As Needed (02 May 2022 15:59)  ramipril 2.5 mg oral capsule: 1 cap(s) orally once a day (02 May 2022 15:59)    MEDICATIONS:  STANDING MEDICATIONS  MEDICATIONS  (STANDING):  atorvastatin 80 milliGRAM(s) Oral daily  enoxaparin Injectable 40 milliGRAM(s) SubCutaneous every 24 hours  lisinopril 10 milliGRAM(s) Oral daily    PRN MEDICATIONS  MEDICATIONS  (PRN):  guaiFENesin  milliGRAM(s) Oral every 12 hours PRN Cough  meclizine 25 milliGRAM(s) Oral three times a day PRN Dizziness      VITAL SIGNS  T(F): 97.5 (05-03 @ 06:07), Max: 97.6 (05-02 @ 13:17)  HR: 62 (05-03 @ 06:07) (62 - 90)  BP: 116/64 (05-03 @ 06:07) (116/64 - 183/79)  RR: 19 (05-03 @ 06:07) (18 - 20)  SpO2: 96% (05-03 @ 03:49) (96% - 97%)    LABS:                        11.2   6.27  )-----------( 246      ( 03 May 2022 07:40 )             34.5     05-03    133<L>  |  98  |  8<L>  ----------------------------<  92  4.3   |  27  |  0.8    Ca    8.9      03 May 2022 07:40  Phos  3.5     05-03  Mg     1.9     05-03    TPro  6.3  /  Alb  3.4<L>  /  TBili  0.6  /  DBili  x   /  AST  20  /  ALT  15  /  AlkPhos  84  05-02          CARDIAC MARKERS ( 02 May 2022 04:50 )  x     / <0.01 ng/mL / x     / x     / x            RADIOLOGY:   Reviewed    < from: US Duplex Carotid Arteries Complete, Bilateral (04.30.22 @ 10:31) >  Extensive bilateral common carotid arterial atherosclerotic plaque with a   markedly elevated velocities near complete occlusion of left carotid bulb.    Decreased velocities involving bilateral internal carotid arteries.    < end of copied text >    < from: CT Head No Cont (05.02.22 @ 05:10) >  No evidence of intracranial hemorrhage, territorial infarct, or mass   effect.    < end of copied text >    < from: CT Chest w/ IV Cont (05.02.22 @ 13:57) >  Ill-defined masslike structure within the left upper lung contiguous with   the mediastinum and left hilum. Mass effect upon the left upper lobe and   left lower lobe bronchi with areas of atelectasis. Mass extends to the   posterior aspect of the left chest wall. Atelectasis within the right   lower lung. Subcentimeter mediastinal adenopathy.    < end of copied text >      PHYSICAL EXAM:  General: Comfortably laying on the hospital bed. NAD. AAOx3. [place]  HEENT: Atraumatic. Normocephalic. EOMI. Conjunctiva and sclera clear.  Cardiac: Normal S1, S2. RRR. No murmurs, rubs, or gallops.  Pulm: CTA b/l no wheezes, rhonchi, or rales.  GI: Soft, nontender, nondistended.  Ext: 2+ pulses. Moving all 4 extremities. No edema, cyanosis.  Neuro: CN II-XII grossly intact except [place]  Skin: No lesions or rashes

## 2022-05-03 NOTE — PHYSICAL THERAPY INITIAL EVALUATION ADULT - GENERAL OBSERVATIONS, REHAB EVAL
PT IE [id number]. Chart reviewed. Pt encountered semi-reclined in bed. In NAD. pt is emotional secondary to recent passing of his son.

## 2022-05-03 NOTE — CHART NOTE - NSCHARTNOTEFT_GEN_A_CORE
Discussion with patient and patient's son, Jack, present by bedside, regarding bronchoscopy with transbronchial biopsy for MARILEE mass held, patient and son in agreement to proceed with procedure while inpatient. Patient placed on schedule for 5/4/22 at 07:30AM. Discussed with Medical Team.

## 2022-05-03 NOTE — CONSULT NOTE ADULT - SUBJECTIVE AND OBJECTIVE BOX
Patient is a 86y old  Male who presents with a chief complaint of worsening dizziness (02 May 2022 13:03)      HPI: 87 yo M with PMHx of Hard of hearing, HTN, HLD, Chronic Vertigo, Pre-DM and Carotid artery stenosis s/p L Carotid endarterectomy BIBA alerted by son for dizziness and feeling unwell. As per son Pt was feeling dizzy yesterday afternoon with associated weakness. Symptoms was resolved after taking his Meclizine. This morning symptoms return associated w/ chills, nausea and coughing up thick clear phlegm. Pt told his son he was unwell and need to go to the hospital. Pt expressed that this is the first time that he felt like this along with his dizziness. He has been experiencing dizziness about 2 years ago and was followed at the [facility]. Pt is unsure of any vertigo work up other than being prescribed meclizine. He reported that he would experience dizziness at anytime while sitting. He described his dizziness as the room spinning. He denies h/o head trauma, HA/Migraine, seizure, visual/auditory changes, h/o fall, chest pain/palpitation, recent illness, cough, GI/ changes.     Pt also lost his youngest son last week to cardiac infarction (02 May 2022 13:03)      PAST MEDICAL & SURGICAL HISTORY:  Vertigo    HTN (hypertension)    HLD (hyperlipidemia)    History of CEA (carotid endarterectomy)    S/P cataract surgery    H/O umbilical hernia repair        SOCIAL HX:   Smoking   exsmoker, quit 40 years ago                      ETOH   social                         Other  Retired transit  of 23yrs  Lives in [place] and recently staying w/ son on [place]    FAMILY HISTORY:  FH: HTN (hypertension) (Mother)    FH: lung cancer (Father)    FHx: myocardial infarction (Child)    .  No cardiovascular or pulmonary family history     REVIEW OF SYSTEMS:    All ROS are negative except per HPI       Allergies    No Known Allergies    Intolerances          PHYSICAL EXAM  Vital Signs Last 24 Hrs  T(C): 36.4 (03 May 2022 06:07), Max: 36.4 (02 May 2022 13:17)  T(F): 97.5 (03 May 2022 06:07), Max: 97.6 (02 May 2022 13:17)  HR: 62 (03 May 2022 06:07) (62 - 90)  BP: 116/64 (03 May 2022 06:07) (116/64 - 183/79)  BP(mean): 82 (03 May 2022 06:07) (82 - 88)  RR: 19 (03 May 2022 06:07) (18 - 20)  SpO2: 96% (03 May 2022 03:49) (96% - 100%)    CONSTITUTIONAL:  NAD    ENT:   Airway patent,   No thrush    EYES:   Clear bilaterally,   pupils equal,   round and reactive to light.    CARDIAC:   Normal rate,   regular rhythm.    no edema      RESPIRATORY:   No wheezing   Normal chest expansion  Not tachypneic,  No use of accessory muscles    GASTROINTESTINAL:  Abdomen soft, non-tender,   No guarding,   Positive BS    MUSCULOSKELETAL:   Range of motion is not limited,  No clubbing, cyanosis    NEUROLOGICAL:   Alert and oriented   No motor deficits.    SKIN:   Skin normal color for race,   No evidence of rash.              LABS:                          11.8   6.50  )-----------( 234      ( 02 May 2022 04:50 )             34.4                                               05-02    132<L>  |  98  |  10  ----------------------------<  112<H>  4.2   |  26  |  0.7    Ca    8.9      02 May 2022 04:50    TPro  6.3  /  Alb  3.4<L>  /  TBili  0.6  /  DBili  x   /  AST  20  /  ALT  15  /  AlkPhos  84  05-02                                                 CARDIAC MARKERS ( 02 May 2022 04:50 )  x     / <0.01 ng/mL / x     / x     / x                                                LIVER FUNCTIONS - ( 02 May 2022 04:50 )  Alb: 3.4 g/dL / Pro: 6.3 g/dL / ALK PHOS: 84 U/L / ALT: 15 U/L / AST: 20 U/L / GGT: x                                                                                                MEDICATIONS  (STANDING):  atorvastatin 80 milliGRAM(s) Oral daily  enoxaparin Injectable 40 milliGRAM(s) SubCutaneous every 24 hours  lisinopril 10 milliGRAM(s) Oral daily    MEDICATIONS  (PRN):  guaiFENesin  milliGRAM(s) Oral every 12 hours PRN Cough  meclizine 25 milliGRAM(s) Oral three times a day PRN Dizziness        CT CHEST W/ CON:      Central airways/ Lung parenchyma/ pleura :There is ill-defined left upper   lobe mass contiguous with the left hilum. There is no clear fat plane   between this masslike opacity in the left pulmonary artery. Largest   portion of this mass measures 6.6 cm anterior to posterior by greater   than 5 cm in transverse diameter. There are adjacent areas of   interlobular septal thickening which may reflect lymphangitic spread.   There is mass effect upon the left upper lobe and left lower lobe bronchi   by this mass with resultant areas of atelectasis throughout the left   lung. Proximal trachea is patent. Mucous secretion is seen posteriorly   within the trachea at the level of the loan. Atelectatic changes are   seen within the right lower lobe.  Pulmonary Nodules:  Masslike process within the left upper lobe as described.    Chest wall/axilla:    Superior sixpence of the pleural surface and posterior aspect of the left   chest wall.    Mediastinum/Great vessels:Several subcentimeter mediastinal lymph nodes   are identified. No large right hilar lymph node.      Upper abdomen:No adrenal masses.    Osseous structures:Multilevel degenerative changes. There is suggestion   of healed left rib fractures posteriorly.      Impression:    Ill-defined masslike structure within the left upper lung contiguous with   the mediastinum and left hilum. Mass effect upon the left upper lobe and   left lower lobe bronchi with areas of atelectasis. Mass extends to the   posterior aspect of the left chest wall. Atelectasis within the right   lower lung. Subcentimeter mediastinal adenopathy.     Patient is a 86y old  Male who presents with a chief complaint of worsening dizziness (02 May 2022 13:03)      HPI: 87 yo M with PMHx of Hard of hearing, HTN, HLD, Chronic Vertigo, Pre-DM and Carotid artery stenosis SP Left Carotid endarterectomy BIBA alerted by son for dizziness and feeling unwell. As per son Pt was feeling dizzy yesterday afternoon with associated weakness. Symptoms was resolved after taking his Meclizine. This morning symptoms return associated w/ chills, nausea and coughing up thick clear phlegm. Pt told his son he was unwell and need to go to the hospital. Pt expressed that this is the first time that he felt like this along with his dizziness. He has been experiencing dizziness about 2 years ago and was followed at the [facility]. Pt is unsure of any vertigo work up other than being prescribed meclizine. He reported that he would experience dizziness at anytime while sitting. He described his dizziness as the room spinning. He denies h/o head trauma, HA/Migraine, seizure, visual/auditory changes, h/o fall, chest pain/palpitation, recent illness, cough, GI/ changes.     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  No cardiovascular or pulmonary family history     REVIEW OF SYSTEMS:    All ROS are negative except per HPI       Allergies    No Known Allergies    Intolerances          PHYSICAL EXAM  Vital Signs Last 24 Hrs  T(C): 36.4 (03 May 2022 06:07), Max: 36.4 (02 May 2022 13:17)  T(F): 97.5 (03 May 2022 06:07), Max: 97.6 (02 May 2022 13:17)  HR: 62 (03 May 2022 06:07) (62 - 90)  BP: 116/64 (03 May 2022 06:07) (116/64 - 183/79)  BP(mean): 82 (03 May 2022 06:07) (82 - 88)  RR: 19 (03 May 2022 06:07) (18 - 20)  SpO2: 96% (03 May 2022 03:49) (96% - 100%)    CONSTITUTIONAL:  NAD    ENT:   Airway patent,   No thrush    EYES:   Clear bilaterally,   pupils equal,   round and reactive to light.    CARDIAC:   Normal rate,   regular rhythm.    no edema      RESPIRATORY:   No wheezing   Normal chest expansion  Not tachypneic,  No use of accessory muscles    GASTROINTESTINAL:  Abdomen soft, non-tender,   No guarding,   Positive BS    MUSCULOSKELETAL:   Range of motion is not limited,  No clubbing, cyanosis    NEUROLOGICAL:   Alert and oriented   No motor deficits.    SKIN:   Skin normal color for race,   No evidence of rash.              LABS:                          11.8   6.50  )-----------( 234      ( 02 May 2022 04:50 )             34.4                                               05-02    132<L>  |  98  |  10  ----------------------------<  112<H>  4.2   |  26  |  0.7    Ca    8.9      02 May 2022 04:50    TPro  6.3  /  Alb  3.4<L>  /  TBili  0.6  /  DBili  x   /  AST  20  /  ALT  15  /  AlkPhos  84  05-02                                                 CARDIAC MARKERS ( 02 May 2022 04:50 )  x     / <0.01 ng/mL / x     / x     / x                                                LIVER FUNCTIONS - ( 02 May 2022 04:50 )  Alb: 3.4 g/dL / Pro: 6.3 g/dL / ALK PHOS: 84 U/L / ALT: 15 U/L / AST: 20 U/L / GGT: x                                                                                                MEDICATIONS  (STANDING):  atorvastatin 80 milliGRAM(s) Oral daily  enoxaparin Injectable 40 milliGRAM(s) SubCutaneous every 24 hours  lisinopril 10 milliGRAM(s) Oral daily    MEDICATIONS  (PRN):  guaiFENesin  milliGRAM(s) Oral every 12 hours PRN Cough  meclizine 25 milliGRAM(s) Oral three times a day PRN Dizziness        CT CHEST W/ CON:      Central airways/ Lung parenchyma/ pleura :There is ill-defined left upper   lobe mass contiguous with the left hilum. There is no clear fat plane   between this masslike opacity in the left pulmonary artery. Largest   portion of this mass measures 6.6 cm anterior to posterior by greater   than 5 cm in transverse diameter. There are adjacent areas of   interlobular septal thickening which may reflect lymphangitic spread.   There is mass effect upon the left upper lobe and left lower lobe bronchi   by this mass with resultant areas of atelectasis throughout the left   lung. Proximal trachea is patent. Mucous secretion is seen posteriorly   within the trachea at the level of the loan. Atelectatic changes are   seen within the right lower lobe.  Pulmonary Nodules:  Masslike process within the left upper lobe as described.    Chest wall/axilla:    Superior sixpence of the pleural surface and posterior aspect of the left   chest wall.    Mediastinum/Great vessels:Several subcentimeter mediastinal lymph nodes   are identified. No large right hilar lymph node.      Upper abdomen:No adrenal masses.      Impression:    Ill-defined masslike structure within the left upper lung contiguous with   the mediastinum and left hilum. Mass effect upon the left upper lobe and   left lower lobe bronchi with areas of atelectasis. Mass extends to the   posterior aspect of the left chest wall. Atelectasis within the right   lower lung. Subcentimeter mediastinal adenopathy.     Patient is a 86y old  Male who presents with a chief complaint of worsening dizziness (02 May 2022 13:03)      HPI: 85 yo M with PMHx of Hard of hearing, HTN, HLD, Chronic Vertigo, Pre-DM and Carotid artery stenosis SP Left Carotid endarterectomy BIBA alerted by son for dizziness and feeling unwell. As per son Pt was feeling dizzy yesterday afternoon with associated weakness. Symptoms was resolved after taking his Meclizine. This morning symptoms return associated w/ chills, nausea and coughing up thick clear phlegm. Pt told his son he was unwell and need to go to the hospital. Pt expressed that this is the first time that he felt like this along with his dizziness. He has been experiencing dizziness about 2 years ago and was followed at the [facility]. Pt is unsure of any vertigo work up other than being prescribed meclizine. He reported that he would experience dizziness at anytime while sitting. He described his dizziness as the room spinning. He denies h/o head trauma, HA/Migraine, seizure, visual/auditory changes, h/o fall, chest pain/palpitation, recent illness, cough, GI/ changes.     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  No cardiovascular or pulmonary family history     REVIEW OF SYSTEMS:    All ROS are negative except per HPI       Allergies    No Known Allergies    Intolerances          PHYSICAL EXAM  Vital Signs Last 24 Hrs  T(C): 36.4 (03 May 2022 06:07), Max: 36.4 (02 May 2022 13:17)  T(F): 97.5 (03 May 2022 06:07), Max: 97.6 (02 May 2022 13:17)  HR: 62 (03 May 2022 06:07) (62 - 90)  BP: 116/64 (03 May 2022 06:07) (116/64 - 183/79)  BP(mean): 82 (03 May 2022 06:07) (82 - 88)  RR: 19 (03 May 2022 06:07) (18 - 20)  SpO2: 96% (03 May 2022 03:49) (96% - 100%)    CONSTITUTIONAL:  NAD    ENT:   Airway patent,   No thrush    EYES:   Clear bilaterally,   pupils equal,   round and reactive to light.    CARDIAC:   Normal rate,   regular rhythm.    no edema      RESPIRATORY:   No wheezing   Normal chest expansion  Not tachypneic,  No use of accessory muscles    GASTROINTESTINAL:  Abdomen soft, non-tender,   No guarding,   Positive BS    MUSCULOSKELETAL:   Range of motion is not limited,  No clubbing, cyanosis    NEUROLOGICAL:   Alert and oriented   No motor deficits.    SKIN:   Skin normal color for race,   No evidence of rash.              LABS:                          11.8   6.50  )-----------( 234      ( 02 May 2022 04:50 )             34.4                                               05-02    132<L>  |  98  |  10  ----------------------------<  112<H>  4.2   |  26  |  0.7    Ca    8.9      02 May 2022 04:50    TPro  6.3  /  Alb  3.4<L>  /  TBili  0.6  /  DBili  x   /  AST  20  /  ALT  15  /  AlkPhos  84  05-02                                                 CARDIAC MARKERS ( 02 May 2022 04:50 )  x     / <0.01 ng/mL / x     / x     / x                                                LIVER FUNCTIONS - ( 02 May 2022 04:50 )  Alb: 3.4 g/dL / Pro: 6.3 g/dL / ALK PHOS: 84 U/L / ALT: 15 U/L / AST: 20 U/L / GGT: x                                                                                                MEDICATIONS  (STANDING):  atorvastatin 80 milliGRAM(s) Oral daily  enoxaparin Injectable 40 milliGRAM(s) SubCutaneous every 24 hours  lisinopril 10 milliGRAM(s) Oral daily    MEDICATIONS  (PRN):  guaiFENesin  milliGRAM(s) Oral every 12 hours PRN Cough  meclizine 25 milliGRAM(s) Oral three times a day PRN Dizziness        CT CHEST W/ CON:      Central airways/ Lung parenchyma/ pleura :There is ill-defined left upper   lobe mass contiguous with the left hilum. There is no clear fat plane   between this masslike opacity in the left pulmonary artery. Largest   portion of this mass measures 6.6 cm anterior to posterior by greater   than 5 cm in transverse diameter. There are adjacent areas of   interlobular septal thickening which may reflect lymphangitic spread.   There is mass effect upon the left upper lobe and left lower lobe bronchi   by this mass with resultant areas of atelectasis throughout the left   lung. Proximal trachea is patent. Mucous secretion is seen posteriorly   within the trachea at the level of the loan. Atelectatic changes are   seen within the right lower lobe.  Pulmonary Nodules:  Masslike process within the left upper lobe as described.    Chest wall/axilla:    Superior sixpence of the pleural surface and posterior aspect of the left   chest wall.    Mediastinum/Great vessels:Several subcentimeter mediastinal lymph nodes   are identified. No large right hilar lymph node.      Upper abdomen:No adrenal masses.      Impression:    Ill-defined masslike structure within the left upper lung contiguous with   the mediastinum and left hilum. Mass effect upon the left upper lobe and   left lower lobe bronchi with areas of atelectasis. Mass extends to the   posterior aspect of the left chest wall. Atelectasis within the right   lower lung. Subcentimeter mediastinal adenopathy.

## 2022-05-03 NOTE — CONSULT NOTE ADULT - SUBJECTIVE AND OBJECTIVE BOX
VASCULAR SURGERY CONSULT NOTE      HPI:  Mr. Escobedo is a 85yo b/l Jamul M w/ PMHx of HTN, HLD, Chronic Vertigo, Pre-DM and Carotid artery stenosis s/p Right Carotid endarterectomy 8 years ago (in [place]), BIBA alerted by son for [place]itgo and feeling unwell. As per son Pt was feeling dizzy yesterday afternoon w/ associated weakness. Symptoms was resolved after taking his Meclizine. This morning symptoms return associated w/ chills, nausea and coughing up tick clear phlegm. Pt told his son he was unwell and need to go to the hospital. Pt expressed that this is the first time that he felt like this along with his dizziness. He has been experiencing dizziness about 2 years ago and was followed at the [facility]. Pt is unsure of any vertigo work up other than being prescribed meclizine. He reported that he would experience dizziness at anytime while sitting. He described his dizziness as the room spinning. He reported no episode of aggravationa dn symptoms ususally relieved with meclizine. He denies h/o head trauma, HA/Migraine, seizure, visual/auditory changes, h/o fall, chest pain/palpitation, recent illness, cough, GI/ changes.     Son reported that he recently saw his PMD and was dx w/ Pre-DM.   Pt also lost his youngest son last week to cardiac infarction (02 May 2022 13:03)        PAST MEDICAL & SURGICAL HISTORY:  Vertigo    HTN (hypertension)    HLD (hyperlipidemia)    History of CEA (carotid endarterectomy)    S/P cataract surgery    H/O umbilical hernia repair      No Known Allergies    Home Medications:  atorvastatin 80 mg oral tablet: 1 tab(s) orally once a day (02 May 2022 15:59)  meclizine 25 mg oral tablet, chewable: 1 tab(s) orally 3 times a day, As Needed (02 May 2022 15:59)  ramipril 2.5 mg oral capsule: 1 cap(s) orally once a day (02 May 2022 15:59)    No permtinent family history of PVD    REVIEW OF SYSTEMS:  GENERAL:                                         negative  SKIN:                                                 negative  OPTHALMOLOGIC:                          negative  ENMT:                                               negative  RESPIRATORY AND THORAX:        negative  CARDIOVASCULAR:                         see HPI  GASTROINTESTINAL:                       negative  NEPHROLOGY:                                  negative  MUSCULOSKELETAL:                       negative  NEUROLOGIC:                                   negative  PSYCHIATRIC:                                    negative  HEMATOLOGY/LYMPHATICS:         negative  ENDOCRINE:                                     negative  ALLERGIC/IMMUNOLOGIC:            negative    12 point ROS otherwise normal except as stated in HPI  FHx: none  SHX:  [ ]  smoking     [ ] alcohol use    PHYSICAL EXAM  Vital Signs Last 24 Hrs  T(C): 36.4 (03 May 2022 06:07), Max: 36.4 (02 May 2022 13:17)  T(F): 97.5 (03 May 2022 06:07), Max: 97.6 (02 May 2022 13:17)  HR: 62 (03 May 2022 06:07) (62 - 90)  BP: 116/64 (03 May 2022 06:07) (116/64 - 183/79)  BP(mean): 82 (03 May 2022 06:07) (82 - 88)  RR: 19 (03 May 2022 06:07) (18 - 20)  SpO2: 96% (03 May 2022 03:49) (96% - 97%)    Appearance: Normal	  HEENT:   Normal oral mucosa, PERRL, EOMI	  Neck: Supple, - JVD;   Cardiovascular: Normal S1 S2, No JVD, No murmurs,   Respiratory: Lungs clear to auscultation, No Rales, Rhonchi, Wheezing	  Gastrointestinal:  Soft, Non-tender, positive BS	  Skin: No rashes, No ecchymoses, No cyanosis  Extremities: Normal range of motion, No clubbing, cyanosis or edema  Vascular: Peripheral pulses palpable 2+ bilaterally  Neurologic: Non-focal  Psychiatry: A & O x 3, Mood & affect appropriate          MEDICATIONS:   MEDICATIONS  (STANDING):  atorvastatin 80 milliGRAM(s) Oral daily  enoxaparin Injectable 40 milliGRAM(s) SubCutaneous every 24 hours  lisinopril 10 milliGRAM(s) Oral daily    MEDICATIONS  (PRN):  guaiFENesin  milliGRAM(s) Oral every 12 hours PRN Cough  meclizine 25 milliGRAM(s) Oral three times a day PRN Dizziness      LAB/STUDIES:                        11.2   6.27  )-----------( 246      ( 03 May 2022 07:40 )             34.5     05-03    133<L>  |  98  |  8<L>  ----------------------------<  92  4.3   |  27  |  0.8    Ca    8.9      03 May 2022 07:40  Phos  3.5     05-03  Mg     1.9     05-03    TPro  6.3  /  Alb  3.4<L>  /  TBili  0.6  /  DBili  x   /  AST  20  /  ALT  15  /  AlkPhos  84  05-02      LIVER FUNCTIONS - ( 02 May 2022 04:50 )  Alb: 3.4 g/dL / Pro: 6.3 g/dL / ALK PHOS: 84 U/L / ALT: 15 U/L / AST: 20 U/L / GGT: x               CARDIAC MARKERS ( 02 May 2022 04:50 )  x     / <0.01 ng/mL / x     / x     / x            IMAGING:    < from: US Duplex Carotid Arteries Complete, Bilateral (04.30.22 @ 10:31) >  Extensive bilateral common carotid arterial atherosclerotic plaque with a   markedly elevated velocities near complete occlusion of left carotid bulb.    Decreased velocities involving bilateral internal carotid arteries.      < from: CT Head No Cont (05.02.22 @ 05:10) >  No evidence of intracranial hemorrhage, territorial infarct, or mass   effect.      < from: CT Chest w/ IV Cont (05.02.22 @ 13:57) >  Ill-defined masslike structure within the left upper lung contiguous with   the mediastinum and left hilum. Mass effect upon the left upper lobe and   left lower lobe bronchi with areas of atelectasis. Mass extends to the   posterior aspect of the left chest wall. Atelectasis within the right   lower lung. Subcentimeter mediastinal adenopathy.

## 2022-05-03 NOTE — PROGRESS NOTE ADULT - SUBJECTIVE AND OBJECTIVE BOX
KEIKO SALDAÑA  86y  Male  ***My note supersedes ALL resident notes that I sign.  My corrections for their notes are in my note.***    I can be reached directly on [employer]. My office number is [phone]. My personal cell number is [phone].    INTERVAL EVENTS: Here for f/u of vertigo. Pt's vertigo started about last Aug 2021. [facility] Pt quit smoking about 40 yrs ago. Pt's son  recently.    T(F): 96.8 (22 @ 13:34), Max: 97.5 (22 @ 19:45)  HR: 71 (22 @ 13:34) (62 - 89)  BP: 135/81 (22 @ 13:34) (116/64 - 140/65)  RR: 18 (22 @ 13:34) (18 - 19)  SpO2: 96% (22 @ 03:49) (96% - 97%)    Gen: NAD; Teller mild; + NC O2  HEENT: PERRL, EOMI, no nystagmus; mouth clr, nose clr  Neck: no nodes, no JVD, thyroid nl  lungs: clr  hrt: s1 s2 rrr no murmur  abd: soft, NT/ND, no HS megaly  ext: no edema, no c/c  neuro: aa ox3, cn intact, can move all 4 ext; Huseyin-Hallpike +    LABS:                      11.2    (    94.5   6.27  )-----------( ---------      246      ( 03 May 2022 07:40 )             34.5    (    14.4     133   (   98   (   92      22 @ 07:40  ----------------------               4.3   (   27   (   8                             -----                        0.8  Ca  8.9   Mg  1.9    P   3.5     CARDIAC MARKERS ( 02 May 2022 04:50 )  x     / <0.01 ng/mL / x     / x     / x        CAPILLARY BLOOD GLUCOSE  POCT Blood Glucose.: 109 (22 @ 22:13)  POCT Blood Glucose.: 95 (22 @ 12:00)  POCT Blood Glucose.: 112 (22 @ 08:24)    Culture - Blood (collected 22 @ 06:15)  Source: .Blood Blood  Preliminary Report (22 @ 16:01):    No growth to date.    Culture - Blood (collected 22 @ 06:15)  Source: .Blood Blood  Preliminary Report (22 @ 16:01):    No growth to date.    RADIOLOGY & ADDITIONAL TESTS:  < from: CT Chest w/ IV Cont (22 @ 13:57) >  Findings:  Tubes/lines: none    Central airways/ Lung parenchyma/ pleura: There is ill-defined left upper   lobe mass contiguous with the left hilum. There is no clear fat plane   between this masslike opacity in the left pulmonary artery. Largest   portion of this mass measures 6.6 cm anterior to posterior by greater   than 5 cm in transverse diameter. There are adjacent areas of   interlobular septal thickening which may reflect lymphangitic spread.   There is mass effect upon the left upper lobe and left lower lobe bronchi   by this mass with resultant areas of atelectasis throughout the left   lung. Proximal trachea is patent. Mucous secretion is seen posteriorly   within the trachea at the level of the loan. Atelectatic changes are   seen within the right lower lobe.    Pulmonary Nodules:  Masslike process within the left upper lobe as described.    Chest wall/axilla:    Superior sixpence of the pleural surface and posterior aspect of the left chest wall.    Mediastinum/Great vessels: Several subcentimeter mediastinal lymph nodes   are identified. No large right hilar lymph node.      Upper abdomen: No adrenal masses.    Osseous structures: Multilevel degenerative changes. There is suggestion   of healed left rib fractures posteriorly.    Impression:    Ill-defined masslike structure within the left upper lung contiguous with   the mediastinum and left hilum. Mass effect upon the left upper lobe and   left lower lobe bronchi with areas of atelectasis. Mass extends to the   posterior aspect of the left chest wall. Atelectasis within the right   lower lung. Subcentimeter mediastinal adenopathy.    < end of copied text >    < from: Xray Chest 1 View- PORTABLE-Urgent (22 @ 05:29) >  Impression:    Left hilar masslike opacity    Further evaluation with a postcontrast chest CT is recommended to   evaluate for the possibility of neoplasm    < end of copied text >    < from: CT Head No Cont (22 @ 05:10) >  Impression:    No evidence of intracranial hemorrhage, territorial infarct, or mass effect.    < end of copied text >    < from: US Duplex Carotid Arteries Complete, Bilateral (22 @ 10:31) >  IMPRESSION:    Extensive bilateral common carotid arterial atherosclerotic plaque with a   markedly elevated velocities near complete occlusion of left carotid bulb.    Decreased velocities involving bilateral internal carotid arteries.    Measurement of carotid stenosis is based on velocity parameters that   correlate the residual internal carotid diameter with that of the more   distal vessel in accordance with a method such as the [place] American   Symptomatic Carotid Endarterectomy Trial (NASCET).    < end of copied text >    MEDICATIONS:    atorvastatin 80 milliGRAM(s) Oral daily  enoxaparin Injectable 40 milliGRAM(s) SubCutaneous every 24 hours  guaiFENesin  milliGRAM(s) Oral every 12 hours PRN  lisinopril 10 milliGRAM(s) Oral daily  meclizine 25 milliGRAM(s) Oral three times a day PRN

## 2022-05-03 NOTE — PROGRESS NOTE ADULT - ASSESSMENT
Mr. Escobedo is a 87yo b/l [place] M w/ PMHx of HTN, HLD, Chronic Vertigo, Pre-DM and Carotid artery stenosis s/p L Carotid endarterectomy BIBA alerted by son for Veritgo and feeling unwell.    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; poss lymphangitic spread; subcm chest LN; lt lung atelectasis  no signs of PNA or indication for Abx  Pulmonology noted  for EBUS w/ bx ann-marie 7:30 am - NPO p MN; IVFs in AM  mucinex 600mg po q12    # b/l carotid dz; hx of Lt CEA  vasc noted  CTA neck w/ contrast  I would NOT operate (or clear pt) until bx of lung lesion is known (carotid sx not indicated if pt has lung cancer)  after bx, can use asa 81mg po q24  pt also on statin  would do echo if going for sx - as outpt    # Chronic Vertigo  in light of lung finding, r/o mets -> MRI B w/ gado  c/w Meclizine 25mg Q8 PRN  outpt neuro f/u    # HTN  c/w lisinopril 10mg po q24    # Normocytic Anemia  no further inpt w/u    # Hyponatremia - mild  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 - f/u CM (will prob send pt home after lung bx ann-marie)

## 2022-05-03 NOTE — PROGRESS NOTE ADULT - ASSESSMENT
ASSESSMENT & PLAN  Mr. Escobedo is a 85yo b/l [place] M w/ PMHx of HTN, HLD, Chronic Vertigo, Pre-DM and Carotid artery stenosis s/p L Carotid endarterectomy BIBA alerted by son for Veritgo and feeling unwell. As per son Pt was feeling dizzy yesterday afternoon w/ associated weakness. Symptoms was resolved after taking his Meclizine. This morning symptoms return associated w/ chills, nausea and coughing up tick clear phlegm. Pt told his son he was unwell and need to go to the hospital.    # Chronic vertigo    # MARILEE lung mass    # Normocytic anemia    # Hyponatremia        # DM2  - FS: 109  - a1c   - home meds: atorvastatin 80 mg oral tablet    - cont lantus, lispro, ISS  - FS qac, qhs    # HTN  - BP: 116/64 (116/64 - 183/79)  - home meds: ramipril 2.5 mg oral capsule      PT/REHAB   ACTIVITY: Activity - Increase As Tolerated    DVT PPX: enoxaparin Injectable    GI PPX:    DIET: Diet, DASH/TLC         CODE:   Disposition:   Pending:   ASSESSMENT & PLAN  Mr. Escobedo is a 85yo b/l [place] M w/ PMHx of HTN, HLD, Chronic Vertigo, Pre-DM and Carotid artery stenosis s/p L Carotid endarterectomy BIBA alerted by son for Veritgo and feeling unwell. As per son Pt was feeling dizzy yesterday afternoon w/ associated weakness. Symptoms was resolved after taking his Meclizine. This morning symptoms return associated w/ chills, nausea and coughing up tick clear phlegm. Pt told his son he was unwell and need to go to the hospital.    # Chronic vertigo  - pt reported acute worsening prior to admission  - cont meclizine 25mg q8h PRN  - outpatient neuro f/u, consider MRI w madelyn    # MARILEE lung mass  - saturating well on RA, currently no pulmonary complaints  - CXR showed L hilar mass  - CT chest with illdefined masslike structure within MARILEE contiguous with mediastinum and L hilum  - former smoker, quit >40 yrs ago  - s/p pulm eval, plan for bronchoscopy and EBUS biopsy tomorrow    # Bilateral carotid artery stenosis  - US carotid artery with extensive atherosclerosis and near complete occlusion of L carotid bulb  - s/p vascular eval  - obtain CTA neck    # Normocytic anemia, stable  - Hgb 11.8->11.2  - no s/s bleeding  - check iron profile    # Hyponatremia, mild  - Na 132->133  - encourage PO intake  - f/u RD c/s    # HTN  # HLD  - BP: 116/64 (116/64 - 183/79)  - on ramipril 2.5mg qd at home  - cont as lisinopril 10mg qd here  - cont atorvastatin 80mg qhs      PT/REHAB: PT  ACTIVITY: Activity - Increase As Tolerated  DVT PPX: enoxaparin Injectable  GI PPX:  n/a  DIET: Diet, DASH/TLC; NPO after MN  CODE: full  Disposition: acute   Pending: Bronch w EBUS tomorrow.

## 2022-05-03 NOTE — CONSULT NOTE ADULT - ASSESSMENT
87yo b/l [place] M w/ PMHx of HTN, HLD, Chronic Vertigo, Pre-DM and Carotid artery stenosis s/p Right Carotid endarterectomy 8 years ago (in [place]), BIBA alerted by son for Veritgo and feeling unwell. As per son Pt was feeling dizzy yesterday afternoon w/ associated weakness. Symptoms was resolved after taking his Meclizine. This morning symptoms return associated w/ chills, nausea and coughing up tick clear phlegm. Pt told his son he was unwell and need to go to the hospital. Pt expressed that this is the first time that he felt like this along with his dizziness. He has been experiencing dizziness about 2 years ago and was followed at the [facility]. Pt is unsure of any vertigo work up other than being prescribed meclizine. He reported that he would experience dizziness at anytime while sitting.    Vascular surgery called to evaluate patient after results of carotid dplx showed extensive bilateral common carotid arterial atherosclerotic plaque with a   markedly elevated velocities near complete occlusion of left carotid bulb.     plan:    - I reviewed today's labs  - I reviewed and personally visualized all the radiology imagings  - I discussed the plan with attending Dr. Arboleda: please, obtain CTA neck to evaluate bilateral carotid disease    Patient and his son are updated on the plan    [facility] [id number]

## 2022-05-03 NOTE — CONSULT NOTE ADULT - ATTENDING COMMENTS
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

## 2022-05-04 NOTE — DIETITIAN INITIAL EVALUATION ADULT - PERTINENT LABORATORY DATA
05-03    133<L>  |  98  |  8<L>  ----------------------------<  92  4.3   |  27  |  0.8    Ca    8.9      03 May 2022 07:40  Phos  3.5     05-03  Mg     1.9     05-03

## 2022-05-04 NOTE — DIETITIAN INITIAL EVALUATION ADULT - PERTINENT MEDS FT
MEDICATIONS  (STANDING):  atorvastatin 80 milliGRAM(s) Oral daily  lactated ringers. 1000 milliLiter(s) (50 mL/Hr) IV Continuous <Continuous>  lisinopril 10 milliGRAM(s) Oral daily    MEDICATIONS  (PRN):  acetaminophen     Tablet .. 650 milliGRAM(s) Oral every 6 hours PRN Temp greater or equal to 38C (100.4F), Mild Pain (1 - 3)  guaiFENesin  milliGRAM(s) Oral every 12 hours PRN Cough  LORazepam   Injectable 0.5 milliGRAM(s) IV Push once PRN prior to MRI  meclizine 25 milliGRAM(s) Oral three times a day PRN Dizziness

## 2022-05-04 NOTE — DIETITIAN INITIAL EVALUATION ADULT - SIGNS/SYMPTOMS
as evidenced by muscle loss and fat loss As evidenced by patient report with po intake <25-75% x4 months

## 2022-05-04 NOTE — DIETITIAN INITIAL EVALUATION ADULT - COLLABORATION WITH OTHER PROVIDERS
Initial Anesthesia Post-op Note    Patient: Leo Brody  Procedure(s) Performed: COLONOSCOPY  Anesthesia type: MAC    Vitals Value Taken Time   Temp 36.3 °C (97.4 °F) 01/14/22 0834   Pulse 65 01/14/22 0834   Resp 16 01/14/22 0834   SpO2 99 % 01/14/22 0834   /76 01/14/22 0834         Patient Location: Phase II  Post-op Vital Signs:stable  Level of Consciousness: participates in exam, alert, awake and oriented  Respiratory Status: spontaneous ventilation, room air and unassisted  Cardiovascular stable  Hydration: euvolemic  Pain Management: well controlled  Handoff: Handoff to receiving clinician was performed and questions were answered  Vomiting: none  Nausea: None  Airway Patency:patent  Post-op Assessment: no complications, patient tolerated procedure well with no complications and no evidence of recall      No complications documented.  
Interventions: medical food supplements, coordination of care  Monitoring and Evaluation: diet order, energy intake, weight, labs, skin status, NFPF

## 2022-05-04 NOTE — CHART NOTE - NSCHARTNOTEFT_GEN_A_CORE
ENDO RECOVERY ADMISSION NOTE      Procedure:   Post op diagnosis:      ____  Intubated  TV:______       Rate: ______      FiO2: ______    __x__  Patent Airway    __x__  Full return of protective reflexes    __x__  Full recovery from anesthesia / back to baseline status    Vitals  HR: 106  BP: 125/84  RR: 18  O2 Sat: 97%  Temp: 97.1    Mental Status:  __x__ Awake   ___x__ Alert   _____ Drowsy   _____ Sedated    Nausea/Vomiting:  __x__ NO  ______Yes,   See Post - Op Orders          Pain Scale (0-10):  _____    Treatment: ____ None    __x__ See Post - Op/PCA Orders    Post - Operative Fluids:   ____ Oral   __x__ See Post - Op Orders    Plan: Discharge when criteria met:   ____Home       __x___Floor     _____Critical Care   Other:_________________    Comments: Patient had smooth intraoperative event, no anesthesia complication.

## 2022-05-04 NOTE — DIETITIAN INITIAL EVALUATION ADULT - OTHER INFO
Patient is an 87 yo male with PMHx of HTN, HLD, Chronic Vertigo, Pre-DM and Carotid Artery Stenosis s/p L Carotid endarterectomy. He was admitted for worsening Vertigo x 1 day. He is being treated for the following:  #Chronic vertigo  #MARILEE lung mass  - saturating well on RA, currently no pulmonary complaints  - CXR showed L hilar mass  - CT chest with ill defined masslike structure within MARILEE contiguous with mediastinum and L hilum  - former smoker, quit >40 yrs ago  #Bilateral carotid artery stenosis s/p R CEA  #Normocytic anemia, stable  -Hgb 11.8 -> 11.2  -no s/s bleeding  #Hyponatremia, mild  -Na 132 -> 133  -Encourage PO intake  -f/u RD c/s  #HTN  #HLD

## 2022-05-04 NOTE — DIETITIAN INITIAL EVALUATION ADULT - ADD RECOMMEND
1) Continue current diet order  2) Recommend Ensure Compact - Chocolate flavor 3x/day   3) Recommend Boost Pudding 3x/day     Patient with PCM and is at high nutrition risk, RD to f/u in 4 days

## 2022-05-04 NOTE — PROGRESS NOTE ADULT - CONVERSATION DETAILS
Pt would accept mostly all med tx. He would go for carotid sx (he's had it in the past). He would accept cancer care, if need be. He remains full code. He looks pretty good for 87 yo and his MS is decent.

## 2022-05-04 NOTE — PROGRESS NOTE ADULT - ASSESSMENT
Mr. Escobedo is a 87yo b/l [place] M w/ PMHx of HTN, HLD, Chronic Vertigo, Pre-DM and Carotid artery stenosis s/p L Carotid endarterectomy BIBA alerted by son for Veritgo and feeling unwell.    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; poss lymphangitic spread; subcm chest LN; lt lung atelectasis  cancer is suspected  holding off on abx  pt had a lot of bleeding during bronch (spoke w/ Dr Fletcher): this explains desat (pulm w/ f/u ann-marie)  pt will likely need home O2 - re-eval ann-marie  d/c IVFs  lasix 40mg iv x1 given  albuterol neb x1 (for atelectasis)  incent carlos to bedside  EBUS: friable mucosa noted; LN bx done  f/u GS, CX  f/u path  c/w mucinex 600mg po q12  if develops hemoptysis, then hold DVT ppx    # b/l carotid dz; hx of Rt CEA  vasc noted  CTA neck w/ contrast: Rt CEA is patent; Lt mid CCA and carotid bulb w/ 70-80% stenosis;  I would NOT operate (or clear pt) until bx of lung lesion is known (carotid sx not indicated if pt has lung cancer)  will use asa 81mg po q24 once no further bleeding (had bleeding on bronch today)  pt also on statin  would do echo if going for sx -> as outpt    # Chronic Vertigo  in light of lung finding, r/o mets -> MRI B w/ gado  c/w Meclizine 25mg Q8 PRN  outpt neuro f/u    # HTN  c/w lisinopril 10mg po q24    # Normocytic Anemia  no further inpt w/u    # Hyponatremia - mild; better  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 Escobedo is a 85yo b/l [place] M w/ PMHx of HTN, HLD, Chronic Vertigo, Pre-DM and Carotid artery stenosis s/p L Carotid endarterectomy BIBA alerted by son for Veritgo and feeling unwell.    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; poss lymphangitic spread; subcm chest LN; lt lung atelectasis  acute hypoxic resp failure  cancer is suspected  holding off on abx  pt had a lot of bleeding during bronch (spoke w/ Dr Fletcher): this explains desat (pulm w/ f/u ann-marie)  pt will likely need home O2 - re-eval ann-marie  d/c IVFs  lasix 40mg iv x1 given  albuterol neb x1 (for atelectasis)  incent carlos to bedside  EBUS: friable mucosa noted; LN bx done  f/u GS, CX  f/u path  c/w mucinex 600mg po q12  if develops hemoptysis, then hold DVT ppx    # b/l carotid dz; hx of Rt CEA  vasc noted  CTA neck w/ contrast: Rt CEA is patent; Lt mid CCA and carotid bulb w/ 70-80% stenosis;  I would NOT operate (or clear pt) until bx of lung lesion is known (carotid sx not indicated if pt has lung cancer)  will use asa 81mg po q24 once no further bleeding (had bleeding on bronch today)  pt also on statin  would do echo if going for sx -> as outpt    # Chronic Vertigo  in light of lung finding, r/o mets -> MRI B w/ gado  c/w Meclizine 25mg Q8 PRN  outpt neuro f/u    # HTN  c/w lisinopril 10mg po q24    # Normocytic Anemia  no further inpt w/u    # Hyponatremia - mild; better  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

## 2022-05-04 NOTE — DIETITIAN INITIAL EVALUATION ADULT - NS FNS DIET ORDER
Diet, NPO after Midnight:      NPO Start Date: 03-May-2022,   NPO Start Time: 23:59  Except Medications (05-03-22 @ 16:58)  Diet, DASH/TLC:   Sodium & Cholesterol Restricted  1500mL Fluid Restriction ([id number]) (05-03-22 @ 16:58)

## 2022-05-04 NOTE — DIETITIAN INITIAL EVALUATION ADULT - PHYSCIAL ASSESSMENT
Overweight BMI, but with mild temporal wasting and buccal fat loss, and moderate muscles loss (clavicles)

## 2022-05-04 NOTE — PROGRESS NOTE ADULT - SUBJECTIVE AND OBJECTIVE BOX
KEIKO SALDAÑA MRN#: [id number]  CODE STATUS: FULL CODE     SUBJECTIVE   Chief complaint: Worsening Veritgo x 1 day    Currently admitted to medicine with the primary diagnosis of PNEUMONIA VERTIGO      Today is hospital day 2d,   INTERVAL HPI/OVERNIGHT EVENTS: No acute events overnight    This morning, he is laying in bed.     Urinating and stooling appropriately.    Present Today:           Zaman Catheter (x)No/ ()Yes?   Indication:             Central Line (x)No/ ()Yes?  Indication:          IV Fluids ()No/ (x)Yes?  Indication: NPO, s/p IV contrast    OBJECTIVE  PAST MEDICAL & SURGICAL HISTORY  Vertigo    HTN (hypertension)    HLD (hyperlipidemia)    History of CEA (carotid endarterectomy)    S/P cataract surgery    H/O umbilical hernia repair      ALLERGIES:  No Known Allergies    HOME MEDICATIONS:  Home Medications:  atorvastatin 80 mg oral tablet: 1 tab(s) orally once a day (02 May 2022 15:59)  meclizine 25 mg oral tablet, chewable: 1 tab(s) orally 3 times a day, As Needed (02 May 2022 15:59)  ramipril 2.5 mg oral capsule: 1 cap(s) orally once a day (02 May 2022 15:59)    MEDICATIONS:  STANDING MEDICATIONS  MEDICATIONS  (STANDING):  atorvastatin 80 milliGRAM(s) Oral daily  lactated ringers. 1000 milliLiter(s) (50 mL/Hr) IV Continuous <Continuous>  lisinopril 10 milliGRAM(s) Oral daily    PRN MEDICATIONS  MEDICATIONS  (PRN):  acetaminophen     Tablet .. 650 milliGRAM(s) Oral every 6 hours PRN Temp greater or equal to 38C (100.4F), Mild Pain (1 - 3)  guaiFENesin  milliGRAM(s) Oral every 12 hours PRN Cough  LORazepam   Injectable 0.5 milliGRAM(s) IV Push once PRN prior to MRI  meclizine 25 milliGRAM(s) Oral three times a day PRN Dizziness      VITAL SIGNS  T(F): 97.1 (05-04 @ 08:35), Max: 97.9 (05-03 @ 20:15)  HR: 92 (05-04 @ 08:50) (71 - 105)  BP: 117/55 (05-04 @ 08:50) (117/55 - 182/97)  RR: 24 (05-04 @ 08:50) (18 - 24)  SpO2: 95% (05-04 @ 08:50) (95% - 97%)    LABS:                        11.2   6.27  )-----------( 246      ( 03 May 2022 07:40 )             34.5     05-03    133<L>  |  98  |  8<L>  ----------------------------<  92  4.3   |  27  |  0.8    Ca    8.9      03 May 2022 07:40  Phos  3.5     05-03  Mg     1.9     05-03          Culture - Blood (collected 02 May 2022 06:15)  Source: .Blood Blood  Preliminary Report:    No growth to date.    Culture - Blood (collected 02 May 2022 06:15)  Source: .Blood Blood  Preliminary Report:    No growth to date.        RADIOLOGY:   Reviewed  < from: CT Head No Cont (05.02.22 @ 05:10) >  No evidence of intracranial hemorrhage, territorial infarct, or mass   effect.    < end of copied text >    < from: CT Chest w/ IV Cont (05.02.22 @ 13:57) >  Ill-defined masslike structure within the left upper lung contiguous with   the mediastinum and left hilum. Mass effect upon the left upper lobe and   left lower lobe bronchi with areas of atelectasis. Mass extends to the   posterior aspect of the left chest wall. Atelectasis within the right   lower lung. Subcentimeter mediastinal adenopathy.    < end of copied text >    < from: CT Angio Neck w/ IV Cont (05.03.22 @ 18:25) >  1.  Right carotid: Post CEA appearance of the right carotid bifurcation   without significant stenosis.    2.  Left carotid: Extensive atheromatous changes of the common carotid   artery with a level of severe (70-80%) stenosis at the mid CCA. Densely   calcified plaque at the carotid bulb with severe (70-80%) stenosis.    3.  Redemonstrated large irregular infiltrative lung mass in the left   upper lobe, see dedicated CT chest from the prior day.    < end of copied text >      PHYSICAL EXAM:  General: Comfortably laying on the hospital bed. NAD. AAOx3.  HEENT: Atraumatic. Normocephalic. EOMI. Conjunctiva and sclera clear.  Cardiac: Normal S1, S2. RRR. No murmurs, rubs, or gallops.  Pulm: CTA b/l no wheezes, rhonchi, or rales.  GI: Soft, nontender, nondistended. BSx4q  Ext: 2+ pulses. Moving all 4 extremities. No edema, cyanosis.  Neuro: CN II-XII grossly intact  Skin: No lesions or rashes   KEIKO SALDAÑA MRN#: [id number]  CODE STATUS: FULL CODE     SUBJECTIVE   Chief complaint: Worsening Veritgo x 1 day    Currently admitted to medicine with the primary diagnosis of PNEUMONIA VERTIGO      Today is hospital day 2d,   INTERVAL HPI/OVERNIGHT EVENTS: No acute events overnight    This morning, he is laying in bed. Pt seen after bronchoscopy this afternoon. He reports some SOB when suppl o2 off. Otherwise no complaints.     Urinating and stooling appropriately.    Present Today:           Zaman Catheter (x)No/ ()Yes?   Indication:             Central Line (x)No/ ()Yes?  Indication:          IV Fluids ()No/ (x)Yes?  Indication: NPO, s/p IV contrast    OBJECTIVE  PAST MEDICAL & SURGICAL HISTORY  Vertigo    HTN (hypertension)    HLD (hyperlipidemia)    History of CEA (carotid endarterectomy)    S/P cataract surgery    H/O umbilical hernia repair      ALLERGIES:  No Known Allergies    HOME MEDICATIONS:  Home Medications:  atorvastatin 80 mg oral tablet: 1 tab(s) orally once a day (02 May 2022 15:59)  meclizine 25 mg oral tablet, chewable: 1 tab(s) orally 3 times a day, As Needed (02 May 2022 15:59)  ramipril 2.5 mg oral capsule: 1 cap(s) orally once a day (02 May 2022 15:59)    MEDICATIONS:  STANDING MEDICATIONS  MEDICATIONS  (STANDING):  atorvastatin 80 milliGRAM(s) Oral daily  lactated ringers. 1000 milliLiter(s) (50 mL/Hr) IV Continuous <Continuous>  lisinopril 10 milliGRAM(s) Oral daily    PRN MEDICATIONS  MEDICATIONS  (PRN):  acetaminophen     Tablet .. 650 milliGRAM(s) Oral every 6 hours PRN Temp greater or equal to 38C (100.4F), Mild Pain (1 - 3)  guaiFENesin  milliGRAM(s) Oral every 12 hours PRN Cough  LORazepam   Injectable 0.5 milliGRAM(s) IV Push once PRN prior to MRI  meclizine 25 milliGRAM(s) Oral three times a day PRN Dizziness      VITAL SIGNS  T(F): 97.1 (05-04 @ 08:35), Max: 97.9 (05-03 @ 20:15)  HR: 92 (05-04 @ 08:50) (71 - 105)  BP: 117/55 (05-04 @ 08:50) (117/55 - 182/97)  RR: 24 (05-04 @ 08:50) (18 - 24)  SpO2: 95% (05-04 @ 08:50) (95% - 97%)    LABS:                        11.2   6.27  )-----------( 246      ( 03 May 2022 07:40 )             34.5     05-03    133<L>  |  98  |  8<L>  ----------------------------<  92  4.3   |  27  |  0.8    Ca    8.9      03 May 2022 07:40  Phos  3.5     05-03  Mg     1.9     05-03          Culture - Blood (collected 02 May 2022 06:15)  Source: .Blood Blood  Preliminary Report:    No growth to date.    Culture - Blood (collected 02 May 2022 06:15)  Source: .Blood Blood  Preliminary Report:    No growth to date.        RADIOLOGY:   Reviewed  < from: CT Head No Cont (05.02.22 @ 05:10) >  No evidence of intracranial hemorrhage, territorial infarct, or mass   effect.    < end of copied text >    < from: CT Chest w/ IV Cont (05.02.22 @ 13:57) >  Ill-defined masslike structure within the left upper lung contiguous with   the mediastinum and left hilum. Mass effect upon the left upper lobe and   left lower lobe bronchi with areas of atelectasis. Mass extends to the   posterior aspect of the left chest wall. Atelectasis within the right   lower lung. Subcentimeter mediastinal adenopathy.    < end of copied text >    < from: CT Angio Neck w/ IV Cont (05.03.22 @ 18:25) >  1.  Right carotid: Post CEA appearance of the right carotid bifurcation   without significant stenosis.    2.  Left carotid: Extensive atheromatous changes of the common carotid   artery with a level of severe (70-80%) stenosis at the mid CCA. Densely   calcified plaque at the carotid bulb with severe (70-80%) stenosis.    3.  Redemonstrated large irregular infiltrative lung mass in the left   upper lobe, see dedicated CT chest from the prior day.    < end of copied text >      PHYSICAL EXAM:  General: Laying on the hospital bed. NAD. AAOx3. [place].  HEENT: Atraumatic. Normocephalic. EOMI. Conjunctiva and sclera clear.  Cardiac: Normal S1, S2. RRR. No murmurs, rubs, or gallops.  Pulm: CTA b/l no wheezes, rhonchi, or rales. on NC 3L.   GI: Soft, nontender, nondistended.  Ext: 2+ pulses. Moving all 4 extremities. No edema, cyanosis.  Neuro: CN II-XII grossly intact except CN VIII  Skin: No lesions or rashes

## 2022-05-04 NOTE — DIETITIAN NUTRITION RISK NOTIFICATION - TREATMENT: THE FOLLOWING DIET HAS BEEN RECOMMENDED
Diet, DASH/TLC:   Sodium & Cholesterol Restricted  1500mL Fluid Restriction ([id number])  Free Water Flush Instructions:  ***ENSURE COMPACT - CHOCOLATE FLAVOR***BOOST SUBSTITUTED FOR ENSURE PUDDING***THANK  YOU  Supplement Feeding Modality:  Oral  Ensure Pudding Cans or Servings Per Day:  1       Frequency:  Three Times a day  Ensure Compact Cans or Servings Per Day:  1       Frequency:  Three Times a day (05-04-22 @ 14:20) [Pending Verification By Attending]  Diet, NPO after Midnight:      NPO Start Date: 03-May-2022,   NPO Start Time: 23:59  Except Medications (05-03-22 @ 16:58) [Active]  Diet, DASH/TLC:   Sodium & Cholesterol Restricted  1500mL Fluid Restriction ([id number]) (05-03-22 @ 16:58) [Active]    Patient noted with moderate malnutrition in the context of chronic illness as evidenced by muscle loss (clavicles - moderate; temples mild) and mild loss of subcutaneous fat (buccal)

## 2022-05-04 NOTE — DIETITIAN INITIAL EVALUATION ADULT - ORAL NUTRITION SUPPLEMENTS
Ensure Compact 3x/day Chocolate Flavor (220 kcal, 9 gm Protein each)  Ensure Pudding (Boost Pudding) - 230 kcal, 7 gm Protein each  In order to aid in meeting estimated nutrient needs

## 2022-05-04 NOTE — CHART NOTE - NSCHARTNOTEFT_GEN_A_CORE
Pt returned from bronchoscopy today.  spO2 checked on RA - 86%, requiring 3L NC increased from yesterday.    CXR obtained showing increased b/l pulmonary vascular congestion and new R sided effusion.    Pt received gentle IV hydration this AM as received IV contrast yesterday and was NPO for bronch.    Will give IV lasix 40mg x1 and monitor for response.

## 2022-05-04 NOTE — PROGRESS NOTE ADULT - SUBJECTIVE AND OBJECTIVE BOX
KEIKO SALDAÑA  86y  Male  ***My note supersedes ALL resident notes that I sign.  My corrections for their notes are in my note.***    I can be reached directly on [id number]. My office number is [phone]. My personal cell number is [phone].    INTERVAL EVENTS: Here for f/u of lung lesion. Pt had EBUS today. Did OK. Had desat on RA to 86%. Sat better on 3L NC. Pt reporting phelgm. No hemoptysis so far. Pt still w/ vertigo, but not worse. Pt able to eat/drink.    T(F): 96.5 (05-04-22 @ 12:47), Max: 97.9 (05-03-22 @ 20:15)  HR: 82 (05-04-22 @ 12:47) (71 - 105)  BP: 156/65 (05-04-22 @ 12:47) (117/55 - 182/97)  RR: 19 (05-04-22 @ 12:47) (18 - 24)  SpO2: 95% (05-04-22 @ 14:18) (86% - 97%)    Gen: NAD; [place] mild; + NC O2 3L  HEENT: PERRL, EOMI, no nystagmus; mouth clr, nose clr  Neck: no nodes, no JVD, thyroid nl; Rt CEA scar  lungs: no wheeze; + crackles on left; decr BS on left; rt fairly clear  hrt: s1 s2 rrr no murmur  abd: soft, NT/ND, no HS megaly  ext: no edema, no c/c  neuro: aa ox3, cn intact, can move all 4 ext; Huseyin-Hallpike +    LABS:                      11.9    (    93.7   8.79  )-----------( ---------      254      ( 04 May 2022 12:00 )             35.4    (    14.5     137   (   101   (   97      05-04-22 @ 12:00  ----------------------               4.7   (   25   (   9                             -----                        0.7  Ca  8.8   Mg  1.9    P   --     LFT  6.0  (  0.5  (  16       05-04-22 @ 12:00  -------------------------  3.6  (  84  (  13    PT/INR - ( 04 May 2022 12:00 )   PT: 10.90 sec;   INR: 0.95 ratio      CAPILLARY BLOOD GLUCOSE  POCT Blood Glucose.: 109 (05-02-22 @ 22:13)  POCT Blood Glucose.: 95 (05-02-22 @ 12:00)  POCT Blood Glucose.: 112 (05-02-22 @ 08:24)    Culture - Blood (collected 05-02-22 @ 06:15)  Source: .Blood Blood  Preliminary Report (05-03-22 @ 16:01):    No growth to date.    Culture - Blood (collected 05-02-22 @ 06:15)  Source: .Blood Blood  Preliminary Report (05-03-22 @ 16:01):    No growth to date.    RADIOLOGY & ADDITIONAL TESTS:  < from: Xray Chest 1 View- PORTABLE-Urgent (Xray Chest 1 View- PORTABLE-Urgent .) (05.04.22 @ 15:59) >  Impression:    Bilateral opacifications, worsening.    < end of copied text >    < from: VA Duplex Lower Ext Vein Scan, Bilat (05.04.22 @ 14:43) >  Impression:    No evidence of deep venous thrombosis or superficial thrombophlebitis in   the bilateral lower extremities.    < end of copied text >    < from: CT Angio Neck w/ IV Cont (05.03.22 @ 18:25) >  IMPRESSION:    1.  Right carotid: Post CEA appearance of the right carotid bifurcation   without significant stenosis.    2.  Left carotid: Extensive atheromatous changes of the common carotid   artery with a level of severe (70-80%) stenosis at the mid CCA. Densely   calcified plaque at the carotid bulb with severe (70-80%) stenosis.    3.  Redemonstrated large irregular infiltrative lung mass in the left   upper lobe, see dedicated CT chest from the prior day.    < end of copied text >    MEDICATIONS:    acetaminophen     Tablet .. 650 milliGRAM(s) Oral every 6 hours PRN  albuterol/ipratropium for Nebulization. 3 milliLiter(s) Nebulizer once  atorvastatin 80 milliGRAM(s) Oral daily  enoxaparin Injectable 40 milliGRAM(s) SubCutaneous every 24 hours  guaiFENesin  milliGRAM(s) Oral every 12 hours PRN  lisinopril 10 milliGRAM(s) Oral daily  meclizine 25 milliGRAM(s) Oral three times a day PRN

## 2022-05-04 NOTE — PROCEDURE NOTE - NSBRONCHPROCDETAILS_GEN_A_CORE_FT
Written consent obtained from patient. IV sedation administered by Anesthesiology, which was continued and adjusted during the procedure. The  fiberoptic bronchoscope was introduced orally and the tip of the endotracheal tube was noted to be in good position above the loan. The Left tracheobronchial tree was inspected closely to the level of the subsegmental bronchi, fragile, erythematous mucosa with minimal oozing noted from the apicoposterior and anterior segments. Forceps biopsy and BAW obtained from MARILEE. EBUS also performed with FNA of station 7 lymph node. The Right tracheobronchial tree was also inspected, all bronchi are patent with no overt abnormalities. The procedure was completed and all samples were submitted for appropriate studies. After adequate clearing of secretions was accomplished, the bronchoscope was removed from the patient and the procedure was ended. The patient tolerated the procedure well and there were no complications.    Patient's son, Jack, reached at [phone] was updated before and after the procedure.

## 2022-05-04 NOTE — DIETITIAN INITIAL EVALUATION ADULT - ORAL INTAKE PTA/DIET HISTORY
Patient reports varied eating PTA. He would eat 2-3 meals/day, not following any particular diet. He was not taking any vit/min supplements, or oral nutrition supplements. He reports  lbs (x4 months ago) and endorses weight loss PTA due to poor appetite. He does not have any chewing/swallowing difficulties. NKFA / intolerances to food.

## 2022-05-04 NOTE — PROGRESS NOTE ADULT - ASSESSMENT
ASSESSMENT & PLAN  Mr. Escobedo is a 85yo b/l [place] M w/ PMHx of HTN, HLD, Chronic Vertigo, Pre-DM and Carotid artery stenosis s/p L Carotid endarterectomy BIBA alerted by son for Veritgo and feeling unwell. As per son Pt was feeling dizzy yesterday afternoon w/ associated weakness. Symptoms was resolved after taking his Meclizine. This morning symptoms return associated w/ chills, nausea and coughing up tick clear phlegm. Pt told his son he was unwell and need to go to the hospital.    # Chronic vertigo  - pt reported acute worsening prior to admission  - cont meclizine 25mg q8h PRN  - outpatient neuro f/u  - obtain MRI brain w madelyn in setting of newly diagnosed lung mass and acute worsening of vertigo    # MARILEE lung mass  - saturating well on RA, currently no pulmonary complaints  - CXR showed L hilar mass  - CT chest with illdefined masslike structure within MARILEE contiguous with mediastinum and L hilum  - former smoker, quit >40 yrs ago  - s/p pulm eval, bronchoscopy and EBUS biopsy this AM    # Bilateral carotid artery stenosis  - US carotid artery with extensive atherosclerosis and near complete occlusion of L carotid bulb  - s/p vascular eval  - obtain CTA neck    # Normocytic anemia, stable  - Hgb 11.8->11.2  - no s/s bleeding  - check iron profile    # Hyponatremia, mild  - Na 132->133  - encourage PO intake  - f/u RD c/s    # HTN  # HLD  - BP: 116/64 (116/64 - 183/79)  - on ramipril 2.5mg qd at home  - cont as lisinopril 10mg qd here  - cont atorvastatin 80mg qhs      PT/REHAB: PT  ACTIVITY: Activity - Increase As Tolerated  DVT PPX: enoxaparin Injectable  GI PPX:  n/a  DIET: Diet, DASH/TLC; NPO after MN  CODE: full  Disposition: acute   Pending: Bronch w EBUS tomorrow.    ASSESSMENT & PLAN  Mr. Escobedo is a 85yo b/l [place] M w/ PMHx of HTN, HLD, Chronic Vertigo, Pre-DM and Carotid artery stenosis s/p L Carotid endarterectomy BIBA alerted by son for Veritgo and feeling unwell. As per son Pt was feeling dizzy yesterday afternoon w/ associated weakness. Symptoms was resolved after taking his Meclizine. This morning symptoms return associated w/ chills, nausea and coughing up tick clear phlegm. Pt told his son he was unwell and need to go to the hospital.    # Chronic vertigo  - pt reported acute worsening prior to admission  - cont meclizine 25mg q8h PRN  - outpatient neuro f/u  - obtain MRI brain w madelyn in setting of newly diagnosed lung mass and acute worsening of vertigo    # MARILEE lung mass  - saturating well on RA, currently no pulmonary complaints  - CXR showed L hilar mass  - CT chest with illdefined masslike structure within MARILEE contiguous with mediastinum and L hilum  - former smoker, quit >40 yrs ago  - s/p pulm eval, bronchoscopy and EBUS biopsy this AM  - f/u path  - MRI head as above    # Bilateral carotid artery stenosis s/p R CEA  - US carotid artery with extensive atherosclerosis and near complete occlusion of L carotid bulb  - s/p vascular eval  - CTA neck reviewed - R carotid s/p CEA no significant stenosis; L carotid extensive atheromatous changes of common carotid w 70-80% stenosis, densely calcified plaque at the carotid bulb w 70-80% stenosis  - vascular f/u, however in setting of possible lung ca, pt may not be candidate for CEA if indicated    # Normocytic anemia, stable  - Hgb 11.8->11.2  - no s/s bleeding  - check iron profile    # Hyponatremia, mild  - Na 132->133  - encourage PO intake  - f/u RD c/s    # HTN  # HLD  - BP: 117/55 (117/55 - 182/97)  - on ramipril 2.5mg qd at home  - cont as lisinopril 10mg qd here  - cont atorvastatin 80mg qhs  - lipid profile wnl      PT/REHAB: PT  ACTIVITY: Activity - Increase As Tolerated  DVT PPX: enoxaparin Injectable  GI PPX:  n/a  DIET: Diet, DASH/TLC  CODE: full  Disposition: acute poss dc in 24-48h  Pending: Bronch w EBUS today. MRI brain. Vascular f/u. F/u Pathology    ASSESSMENT & PLAN  Mr. Escobedo is a 85yo b/l [place] M w/ PMHx of HTN, HLD, Chronic Vertigo, Pre-DM and Carotid artery stenosis s/p L Carotid endarterectomy BIBA alerted by son for Veritgo and feeling unwell. As per son Pt was feeling dizzy yesterday afternoon w/ associated weakness. Symptoms was resolved after taking his Meclizine. This morning symptoms return associated w/ chills, nausea and coughing up tick clear phlegm. Pt told his son he was unwell and need to go to the hospital.    # Chronic vertigo  - pt reported acute worsening prior to admission  - cont meclizine 25mg q8h PRN  - outpatient neuro f/u  - obtain MRI brain w madelyn in setting of newly diagnosed lung mass and acute worsening of vertigo    # MARILEE lung mass  - saturating well on RA, currently no pulmonary complaints  - CXR showed L hilar mass  - CT chest with illdefined masslike structure within MARILEE contiguous with mediastinum and L hilum  - former smoker, quit >40 yrs ago  - s/p pulm eval, bronchoscopy and EBUS biopsy this AM  - f/u path  - MRI head as above    # Bilateral carotid artery stenosis s/p R CEA  - US carotid artery with extensive atherosclerosis and near complete occlusion of L carotid bulb  - s/p vascular eval  - CTA neck reviewed - R carotid s/p CEA no significant stenosis; L carotid extensive atheromatous changes of common carotid w 70-80% stenosis, densely calcified plaque at the carotid bulb w 70-80% stenosis  - vascular f/u, however in setting of possible lung ca, pt may not be candidate for CEA if indicated -- d/w vascular pt wound benefit from CEA, however will await bx path    # Normocytic anemia, stable  - Hgb 11.8->11.2->11.9  - no s/s bleeding  - check iron profile    # Hyponatremia, resolved  - Na 132->133->137  - encourage PO intake  - RD eval appreciated    # HTN  # HLD  - BP: 117/55 (117/55 - 182/97)  - on ramipril 2.5mg qd at home  - cont as lisinopril 10mg qd here  - cont atorvastatin 80mg qhs  - lipid profile wnl      PT/REHAB: PT  ACTIVITY: Activity - Increase As Tolerated  DVT PPX: enoxaparin Injectable  GI PPX:  n/a  DIET: Diet, DASH/TLC  CODE: full  Disposition: acute poss dc in 24-48h  Pending: Bronch w EBUS today. MRI brain. Vascular f/u. F/u Pathology

## 2022-05-05 NOTE — DISCHARGE NOTE PROVIDER - CARE PROVIDERS DIRECT ADDRESSES
,DirectAddress_Unknown,[email or web address] ,DirectAddress_Unknown,[email or web address]

## 2022-05-05 NOTE — DISCHARGE NOTE PROVIDER - PROVIDER TOKENS
PROVIDER:[TOKEN:[[id number]],FOLLOWUP:[1 week]],PROVIDER:[TOKEN:[[id number]],FOLLOWUP:[2 weeks]] PROVIDER:[TOKEN:[[id number]],FOLLOWUP:[1 week]],PROVIDER:[TOKEN:[[id number]],FOLLOWUP:[2 weeks]],PROVIDER:[TOKEN:[[id number]],FOLLOWUP:[1 month]]

## 2022-05-05 NOTE — DISCHARGE NOTE PROVIDER - NSDCCPCAREPLAN_GEN_ALL_CORE_FT
PRINCIPAL DISCHARGE DIAGNOSIS  Diagnosis: Lung mass  Assessment and Plan of Treatment: You came to the hospital with dizziness and weakness. You had a chest x-ray which showed left sided mass. A chest CT was performed which showed a mass-like structure within the left upper lung. You underwent a bronchoscopy with biopsy of the mass with the pulmonary doctors. You had an MRI brain to evaluate for any spread from the mass which was negative. You will need to follow-up outpatient for the results of the biopsy. You are being discharged home with home oxygen and a hospital bed.      SECONDARY DISCHARGE DIAGNOSES  Diagnosis: Vertigo  Assessment and Plan of Treatment: You came to the hospital with worsening of your vertigo. You were continued on your home medication with improvement.    Diagnosis: Carotid artery stenosis  Assessment and Plan of Treatment: You had a recent ultrasound that showed extensive atherosclerotic disease of your carotid arteries. You were evaluated by the vascular team who recommended a CT angiogram. This imaging showed the right carotid had no significant stenosis. The left carotid has about 70-80% stenosis. The vascular surgeons would like the hold off on surgery until your biopsy results.     PRINCIPAL DISCHARGE DIAGNOSIS  Diagnosis: Lung mass  Assessment and Plan of Treatment: You came to the hospital with dizziness and weakness. You had a chest x-ray which showed left sided mass. A chest CT was performed which showed a mass-like structure within the left upper lung. You underwent a bronchoscopy with biopsy of the mass with the pulmonary doctors. You had an MRI brain to evaluate for any spread from the mass which was negative. You will need to follow-up outpatient for the results of the biopsy. You are being discharged home with home oxygen and a hospital bed.      SECONDARY DISCHARGE DIAGNOSES  Diagnosis: Vertigo  Assessment and Plan of Treatment: You came to the hospital with worsening of your vertigo. You were continued on your home medication with improvement.    Diagnosis: Carotid artery stenosis  Assessment and Plan of Treatment: You had a recent ultrasound that showed extensive atherosclerotic disease of your carotid arteries. You were evaluated by the vascular team who recommended a CT angiogram. This imaging showed the right carotid had no significant stenosis. The left carotid has about 70-80% stenosis. The vascular surgeons would like the hold off on surgery until your biopsy results. You were started on aspirin 81mg in the hospital for stroke prevention. A prescription for aspirin was sent to your pharmacy. Please follow-up with the vascular surgeons outside the hospital.

## 2022-05-05 NOTE — PROGRESS NOTE ADULT - ASSESSMENT
Mr. Saldaña is a 87yo b/l [place] M w/ PMHx of HTN, HLD, Chronic Vertigo, Pre-DM and Carotid artery stenosis s/p L Carotid endarterectomy BIBA alerted by son for Veritgo and feeling unwell.    # pt's son recently  of sudden, massive MI; pt used to live w/ this son  pt will live w/ his other son, Jack, now  pt expressing nl grief at this point, though news is still new ( was )    # Letter of Medical Necessity for Home Oxygen  KEIKO SALDAÑA, 86y years old Male admitted to the hospital with the diagnosis of PNEUMONIA VERTIGO.  Dx(s) for O2 is: Lung mass - cancer suspected, s/p bx  At rest, room air SpO2 is _95%___ and SpO2 on O2 at _3__ lpm via nasal cannula is _97%___.  While ambulating, room air SpO2 is _84%____ and SpO2 on O2 at _3__ lpm via nasal cannula is _95%___.  Patient is aware that he will be going home on oxygen. Patient was tested in chronic, stable state.   Rx written and given to CM    # Letter of Medical Necessity for Hosp Bed  Pain: It is med necessary for this pt to receive a hosp bed for lung mass w/ secretions and new acute resp failure requiring home O2. This pt requires body positioning in ways not feasible w/ an ordinary bed to alleviate pain. This pt requires the head of bed to be elevated > 30 degrees most of the time due to pain. Pillows and wedges have been considered and ruled out. This pt requires body positioning in ways not feasible w/ an ordinary bed to alleviate dyspnea.   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; poss lymphangitic spread; subcm chest LN; lt lung atelectasis  acute hypoxic resp failure  cancer is suspected  holding off on abx  pt had bleeding during bronch (per Dr Fletcher): this explains desat along w/ mass   pt will need home O2  incent carlos to bedside  EBUS: friable mucosa noted; LN bx done  f/u GS, CX  f/u path  c/w mucinex 600mg po q12  if develops hemoptysis, then hold DVT ppx    # b/l carotid dz; hx of Rt CEA  vasc noted  CTA neck w/ contrast: Rt CEA is patent; Lt mid CCA and carotid bulb w/ 70-80% stenosis;  I would NOT operate (or clear pt) until bx of lung lesion is known (carotid sx not indicated if pt has lung cancer)  will use asa 81mg po q24 once no further bleeding (had bleeding on bronch) - can begin tomorrow  pt also on statin  would do echo if going for sx -> as outpt    # Chronic Vertigo  in light of lung finding, r/o mets -> MRI B w/ gado  c/w Meclizine 25mg Q8 PRN, though dizziness/vertigo seems better w/ oxygen  outpt neuro f/u    # HTN  c/w lisinopril 10mg po q24    # Normocytic Anemia  no further inpt w/u    # Hyponatremia - mild; better  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

## 2022-05-05 NOTE — PROGRESS NOTE ADULT - ATTENDING COMMENTS
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

## 2022-05-05 NOTE — PROGRESS NOTE ADULT - SUBJECTIVE AND OBJECTIVE BOX
KEIKO SALDAÑA  86y  Male  ***My note supersedes ALL resident notes that I sign.  My corrections for their notes are in my note.***    I can be reached directly on [employer]. My office number is [phone]. My personal cell number is [phone].    INTERVAL EVENTS: Here for f/u of lung mass. Pt doing OK. Cough is better. Pt was able to walker w/ walker. Pt will be living w/ his son, Jack, going forwards. Pt says dizziness seems better w/ oxygen on board.    T(F): 97 (05-05-22 @ 05:27), Max: 97 (05-05-22 @ 05:27)  HR: 72 (05-05-22 @ 05:27) (72 - 95)  BP: 118/66 (05-05-22 @ 05:27) (117/64 - 156/65)  RR: 18 (05-05-22 @ 09:49) (18 - 19)  SpO2: 95% (05-05-22 @ 09:49) (84% - 97%)    Gen: NAD; [place] mild; + NC O2 3L  HEENT: PERRL, EOMI, no nystagmus; mouth clr, nose clr  Neck: no nodes, no JVD, thyroid nl; Rt CEA scar  lungs: no wheeze; + crackles on left; decr BS on left; rt fairly clear  hrt: s1 s2 rrr no murmur  abd: soft, NT/ND, no HS megaly  ext: no edema, no c/c  neuro: aa ox3, cn intact, can move all 4 ext;    LABS:                      11.4    (    92.9   6.81  )-----------( ---------      229      ( 05 May 2022 04:30 )             33.9    (    14.6     131   (   94   (   97      05-05-22 @ 04:30  ----------------------               4.3   (   27   (   13                             -----                        0.8  Ca  8.7   Mg  1.9    P   --     PT/INR - ( 04 May 2022 12:00 )   PT: 10.90 sec;   INR: 0.95 ratio      CAPILLARY BLOOD GLUCOSE  POCT Blood Glucose.: 109 (05-02-22 @ 22:13)    Culture - Bronchial (collected 05-04-22 @ 08:15)  Source: .Bronchial None  Gram Stain (05-05-22 @ 07:08):    Rare polymorphonuclear leukocytes seen per low power field    Rare Squamous epithelial cells seen per low power field    No organisms seen per oil power field    Culture - Blood (collected 05-02-22 @ 06:15)  Source: .Blood Blood  Preliminary Report (05-03-22 @ 16:01):    No growth to date.    Culture - Blood (collected 05-02-22 @ 06:15)  Source: .Blood Blood  Preliminary Report (05-03-22 @ 16:01):    No growth to date.    RADIOLOGY & ADDITIONAL TESTS:    MEDICATIONS:    acetaminophen     Tablet .. 650 milliGRAM(s) Oral every 6 hours PRN  albuterol/ipratropium for Nebulization. 3 milliLiter(s) Nebulizer once  aspirin  chewable 81 milliGRAM(s) Oral daily  atorvastatin 80 milliGRAM(s) Oral daily  enoxaparin Injectable 40 milliGRAM(s) SubCutaneous every 24 hours  guaiFENesin  milliGRAM(s) Oral every 12 hours PRN  lisinopril 10 milliGRAM(s) Oral daily  meclizine 25 milliGRAM(s) Oral three times a day PRN

## 2022-05-05 NOTE — DISCHARGE NOTE PROVIDER - HOSPITAL COURSE
Mr. Escobedo is a 85yo b/l [place] M w/ PMHx of HTN, HLD, Chronic Vertigo, Pre-DM and Carotid artery stenosis s/p L Carotid endarterectomy BIBA alerted by son for Veritgo and feeling unwell. As per son Pt was feeling dizzy yesterday afternoon w/ associated weakness. Symptoms was resolved after taking his Meclizine. This morning symptoms return associated w/ chills, nausea and coughing up thick clear phlegm. Pt told his son he was unwell and need to go to the hospital. Pt expressed that this is the first time that he felt like this along with his dizziness. He has been experiencing dizziness about 2 years ago and was followed at the [facility]. Pt is unsure of any vertigo work up other than being prescribed meclizine. He reported that he would experience dizziness at anytime while sitting. He described his dizziness as the room spinning. He reported no episode of aggravationa dn symptoms ususally relieved with meclizine. He denies h/o head trauma, HA/Migraine, seizure, visual/auditory changes, h/o fall, chest pain/palpitation, recent illness, cough, GI/ changes.     Son reported that he recently saw his PMD and was dx w/ Pre-DM.   Pt also lost his youngest son last week to cardiac infarction    Pt was found to have L hilar mass on CXR. CT chest revealed ill-defined mass-like structure within MARILEE contiguous with mediastinum and L hilum. Pt went for bronchoscopy and EBUS with pul 5/4, pathology pending.    Pt had recent carotid artery US outpatient which showed extensive bilateral atherosclerosis and near occlusion of L carotid bulb. S/p vascular eval - rec CTA neck. CTA neck obtained which showed R carotid s/p CEA no significant stenosis; L carotid extensive atheromatous changes of common carotid with 70-80% stenosis, densely calcified plaque at the carotid bulb with 70-80% stenosis. Would benefit from L CEA, however will await biopsy results.    MRI brain was obtained to r/o metastatic disease and evaluate pt's vertigo (as he had an acute worsening). MRI showed chronic microvascular disease, no evidence of metastatic diease.     Pt was evaluated for home oxygen and will be d/c'd with home o2. Also will need hospital bed at home.     F/u pulm outpatient for biopsy results. Mr. Escobedo is a 87yo b/l [place] M w/ PMHx of HTN, HLD, Chronic Vertigo, Pre-DM and Carotid artery stenosis s/p L Carotid endarterectomy BIBA alerted by son for Veritgo and feeling unwell. As per son Pt was feeling dizzy yesterday afternoon w/ associated weakness. Symptoms was resolved after taking his Meclizine. This morning symptoms return associated w/ chills, nausea and coughing up thick clear phlegm. Pt told his son he was unwell and need to go to the hospital. Pt expressed that this is the first time that he felt like this along with his dizziness. He has been experiencing dizziness about 2 years ago and was followed at the [facility]. Pt is unsure of any vertigo work up other than being prescribed meclizine. He reported that he would experience dizziness at anytime while sitting. He described his dizziness as the room spinning. He reported no episode of aggravationa dn symptoms ususally relieved with meclizine. He denies h/o head trauma, HA/Migraine, seizure, visual/auditory changes, h/o fall, chest pain/palpitation, recent illness, cough, GI/ changes.     Son reported that he recently saw his PMD and was dx w/ Pre-DM.   Pt also lost his youngest son last week to cardiac infarction    Pt was found to have L hilar mass on CXR. CT chest revealed ill-defined mass-like structure within MARILEE contiguous with mediastinum and L hilum. Pt went for bronchoscopy and EBUS with pul 5/4, pathology pending.    Pt had recent carotid artery US outpatient which showed extensive bilateral atherosclerosis and near occlusion of L carotid bulb. S/p vascular eval - rec CTA neck. CTA neck obtained which showed R carotid s/p CEA no significant stenosis; L carotid extensive atheromatous changes of common carotid with 70-80% stenosis, densely calcified plaque at the carotid bulb with 70-80% stenosis. Would benefit from L CEA, however will await biopsy results.    MRI brain was obtained to r/o metastatic disease and evaluate pt's vertigo (as he had an acute worsening). MRI showed chronic microvascular disease, no evidence of metastatic diease.     Pt was evaluated for home oxygen and will be d/c'd with home o2. Also will need hospital bed at home.     F/u pulm outpatient for biopsy results.     35 minutes spent on discharge planning.

## 2022-05-05 NOTE — PROGRESS NOTE ADULT - SUBJECTIVE AND OBJECTIVE BOX
Patient is a 86y old  Male who presents with a chief complaint of Worsening Veritgo x 1 day (04 May 2022 17:52)      SUBJECTIVE: SP Bronchoscopy yesterday, no acute events overnight, patient states he is well, admits to few episodes of cough productive of dark bloody sputum      PHYSICAL EXAM  Vital Signs Last 24 Hrs  T(C): 36.1 (05 May 2022 05:27), Max: 36.1 (05 May 2022 05:27)  T(F): 97 (05 May 2022 05:27), Max: 97 (05 May 2022 05:27)  HR: 72 (05 May 2022 05:27) (72 - 95)  BP: 118/66 (05 May 2022 05:27) (117/64 - 156/65)  BP(mean): --  RR: 18 (05 May 2022 09:49) (18 - 19)  SpO2: 95% (05 May 2022 09:49) (84% - 97%)    CONSTITUTIONAL:  NAD    ENT:   Airway patent,   No thrush    CARDIAC:   Normal rate,   regular rhythm.    no edema      RESPIRATORY:  No Wheezing  Normal chest expansion  Not tachypneic,  No use of accessory muscles    GASTROINTESTINAL:  Abdomen soft,   non-tender,   no guarding,   + BS    MUSCULOSKELETAL:   range of motion is not limited  no clubbing, cyanosis    NEUROLOGICAL:   Alert and oriented       LABS:                          11.4   6.81  )-----------( 229      ( 05 May 2022 04:30 )             33.9                                               05-05    131<L>  |  94<L>  |  13  ----------------------------<  97  4.3   |  27  |  0.8    Ca    8.7      05 May 2022 04:30  Mg     1.9     05-05    TPro  6.0  /  Alb  3.6  /  TBili  0.5  /  DBili  x   /  AST  16  /  ALT  13  /  AlkPhos  84  05-04      PT/INR - ( 04 May 2022 12:00 )   PT: 10.90 sec;   INR: 0.95 ratio                                                                                              LIVER FUNCTIONS - ( 04 May 2022 12:00 )  Alb: 3.6 g/dL / Pro: 6.0 g/dL / ALK PHOS: 84 U/L / ALT: 13 U/L / AST: 16 U/L / GGT: x                                                  Culture - Fungal, Bronchial (collected 04 May 2022 08:15)  Source: .Bronchial None  Preliminary Report (05 May 2022 08:50):    Testing in progress    Culture - Bronchial (collected 04 May 2022 08:15)  Source: .Bronchial None  Gram Stain (05 May 2022 07:08):    Rare polymorphonuclear leukocytes seen per low power field    Rare Squamous epithelial cells seen per low power field    No organisms seen per oil power field                                                    MEDICATIONS  (STANDING):  albuterol/ipratropium for Nebulization. 3 milliLiter(s) Nebulizer once  aspirin  chewable 81 milliGRAM(s) Oral daily  atorvastatin 80 milliGRAM(s) Oral daily  enoxaparin Injectable 40 milliGRAM(s) SubCutaneous every 24 hours  lisinopril 10 milliGRAM(s) Oral daily    MEDICATIONS  (PRN):  acetaminophen     Tablet .. 650 milliGRAM(s) Oral every 6 hours PRN Temp greater or equal to 38C (100.4F), Mild Pain (1 - 3)  guaiFENesin  milliGRAM(s) Oral every 12 hours PRN Cough  meclizine 25 milliGRAM(s) Oral three times a day PRN Dizziness      X-Rays reviewed

## 2022-05-05 NOTE — CHART NOTE - NSCHARTNOTEFT_GEN_A_CORE
Hospital Bed: It is medically necessary for this patient to receive a hospital bed for lung mass. This patient requires positioning of the body in ways not feasible with an ordinary bed in order to facilitate breathing. This patient requires the head of the bed to be elevated more than 30 degrees most of the time due to obstructing lung mass. Pillows and wedges have been considered and ruled out.

## 2022-05-05 NOTE — DISCHARGE NOTE PROVIDER - NSDCMRMEDTOKEN_GEN_ALL_CORE_FT
acetaminophen 325 mg oral tablet: 2 tab(s) orally every 6 hours, As needed, Temp greater or equal to 38C (100.4F), Mild Pain (1 - 3)  atorvastatin 80 mg oral tablet: 1 tab(s) orally once a day  meclizine 25 mg oral tablet, chewable: 1 tab(s) orally 3 times a day, As Needed  ramipril 2.5 mg oral capsule: 1 cap(s) orally once a day   acetaminophen 325 mg oral tablet: 2 tab(s) orally every 6 hours, As needed, Temp greater or equal to 38C (100.4F), Mild Pain (1 - 3)  aspirin 81 mg oral tablet, chewable: 1 tab(s) orally once a day  atorvastatin 80 mg oral tablet: 1 tab(s) orally once a day  meclizine 25 mg oral tablet, chewable: 1 tab(s) orally 3 times a day, As Needed  ramipril 2.5 mg oral capsule: 1 cap(s) orally once a day

## 2022-05-05 NOTE — DISCHARGE NOTE PROVIDER - NSDCCPTREATMENT_GEN_ALL_CORE_FT
PRINCIPAL PROCEDURE  Procedure: CT chest, with contrast  Findings and Treatment: Comparison: CT thorax none. Comparison is made to chest x-ray from May 2,   2022.  Findings:  Tubes/lines:none  Central airways/ Lung parenchyma/ pleura :There is ill-defined left upper   lobe mass contiguous with the left hilum. There is no clear fat plane   between this masslike opacity in the left pulmonary artery. Largest   portion of this mass measures 6.6 cm anterior to posterior by greater   than 5 cm in transverse diameter. There are adjacent areas of   interlobular septal thickening which may reflect lymphangitic spread.   There is mass effect upon the left upper lobe and left lower lobe bronchi   by this mass with resultant areas of atelectasis throughout the left   lung. Proximal trachea is patent. Mucous secretion is seen posteriorly   within the trachea at the level of the loan. Atelectatic changes are   seen within the right lower lobe.  Pulmonary Nodules:  Masslike process within the left upper lobe as described.  Chest wall/axilla:  Superior sixpence of the pleural surface and posterior aspect of the left   chest wall.  Mediastinum/Great vessels:Several subcentimeter mediastinal lymph nodes   are identified. No large right hilar lymph node.  Upperabdomen:No adrenal masses.  Osseous structures:Multilevel degenerative changes. There is suggestion   of healed left rib fractures posteriorly.  Impression:  Ill-defined masslike structure within the left upper lung contiguous with   the mediastinum and left hilum. Mass effect upon the left upper lobe and   left lower lobe bronchi with areas of atelectasis. Mass extends to the   posterior aspect of the left chest wall. Atelectasis within the right   lower lung. Subcentimeter mediastinal adenopathy.        SECONDARY PROCEDURE  Procedure: MRI brain w/w/o contrast  Findings and Treatment: Correlation with CT head dated 5/2/2022  FINDINGS:  The ventricles and cortical sulci are normal in size and configuration.  There are scattered patchy foci of T2/FLAIR signal hyperintensity within   the cerebral hemispheric white matter consistent with chronic   microvascular changes.  There is no evidence of acute intracranial hemorrhage, extra-axial fluid   collection or midline shift.  There is no diffusion abnormality to suggest acute/subacute infarct.   There is normal flow void present within the major vascular structures.  There is no evidence for pathologic intracranial enhancement.  The prenasal sinuses and mastoids are clear. The patient is status post   right cataract surgery.  IMPRESSION:  1.  No MRI evidence of intracranial metastatic disease.  2.  Mild/moderate chronic microvascular changes.      Procedure: CTA neck w/w/o contrast  Findings and Treatment: Comparison: None  Correlation is made with CT scans of the chest and head 5/2/2022  Findings:  The visualized aortic arch demonstrates mild calcific plaque. There is a   direct origin of the left vertebral artery from the aortic arch, normal   variation. There is calcific stenosis of the left vertebral artery origin   with likely a moderate degree of luminal stenosis. Additional calcific   plaque of the great vessel origins without significant stenosis.  The right common carotid artery demonstrates scattered calcific plaque   without significant stenosis. There are surgical clips surrounding the   right carotid bifurcation which demonstrates a bulbous appearance, likely   reflecting prior CEA. There is no significant stenosis of the right   carotid bulb. Medial retropharyngeal course of the right ICA.  The left common carotid artery demonstrates scattered calcific plaque   with a level of severe (70-80%) stenosis at about the C4-5 disc space   level, series 602 image 136; series 3 image 117. Additional mild and   moderate scattered stenoses. The left carotid bifurcation demonstrates   calcific plaque with severe (70-80%) stenosis, series 3 image 139. The   ECA origin is patent. The remaining cervical left ICA is patent.  There is calcific plaque of the carotid siphons with mild/moderate   stenoses.  The vertebral arteries are patent.  Redemonstrated large irregular infiltrative lung mass in the left upper   lobe.  IMPRESSION:  1.  Right carotid: Post CEA appearance of the right carotid bifurcation   without significant stenosis.  2.  Left carotid: Extensive atheromatous changes of the common carotid   artery with a level of severe (70-80%) stenosis at the mid CCA. Densely   calcified plaque at the carotid bulb with severe (70-80%) stenosis.  3.  Redemonstrated large irregular infiltrative lung mass in the left   upper lobe, see dedicated CT chest from the prior day.

## 2022-05-05 NOTE — DISCHARGE NOTE PROVIDER - NSDCDCMDCOMP_GEN_ALL_CORE
[facility] calling to report a missed appointment today. Called to talk to daughter and they just don't need the services right now. Nothing was wrong they just believe the visit was scheduled too early. She is gong to wait till they need her.  If there are questions [phone] Lorne Lopez
This document is complete and the patient is ready for discharge.

## 2022-05-05 NOTE — DISCHARGE NOTE PROVIDER - DETAILS OF MALNUTRITION DIAGNOSIS/DIAGNOSES
This patient has been assessed with a concern for Malnutrition and was treated during this hospitalization for the following Nutrition diagnosis/diagnoses:     -  05/04/2022: Moderate protein-calorie malnutrition

## 2022-05-05 NOTE — PROGRESS NOTE ADULT - SUBJECTIVE AND OBJECTIVE BOX
KEIKO SALDAÑA MRN#: [id number]  CODE STATUS: FULL CODE     SUBJECTIVE   Chief complaint: Worsening Veritgo    Currently admitted to medicine with the primary diagnosis of PNEUMONIA VERTIGO      Today is hospital day 3d,   INTERVAL HPI/OVERNIGHT EVENTS: Desaturated yesterday post-bronch. Placed on o2 with improvement.     This morning, he is sitting in the chair eating breakfast comfortably. Denies chest pain, shortness of breath, abdominal pain, nausea, vomiting or changes in bowel habits. He has no complaints today. Reports feeling well.     Urinating and stooling appropriately.    Present Today:           Zaman Catheter (x)No/ ()Yes?   Indication:             Central Line (x)No/ ()Yes?  Indication:          IV Fluids (x)No/ ()Yes?  Indication:     OBJECTIVE  PAST MEDICAL & SURGICAL HISTORY  Vertigo    HTN (hypertension)    HLD (hyperlipidemia)    History of CEA (carotid endarterectomy)    S/P cataract surgery    H/O umbilical hernia repair      ALLERGIES:  No Known Allergies    HOME MEDICATIONS:  Home Medications:  atorvastatin 80 mg oral tablet: 1 tab(s) orally once a day (02 May 2022 15:59)  meclizine 25 mg oral tablet, chewable: 1 tab(s) orally 3 times a day, As Needed (02 May 2022 15:59)  ramipril 2.5 mg oral capsule: 1 cap(s) orally once a day (02 May 2022 15:59)    MEDICATIONS:  STANDING MEDICATIONS  MEDICATIONS  (STANDING):  albuterol/ipratropium for Nebulization. 3 milliLiter(s) Nebulizer once  aspirin  chewable 81 milliGRAM(s) Oral daily  atorvastatin 80 milliGRAM(s) Oral daily  enoxaparin Injectable 40 milliGRAM(s) SubCutaneous every 24 hours  lisinopril 10 milliGRAM(s) Oral daily    PRN MEDICATIONS  MEDICATIONS  (PRN):  acetaminophen     Tablet .. 650 milliGRAM(s) Oral every 6 hours PRN Temp greater or equal to 38C (100.4F), Mild Pain (1 - 3)  guaiFENesin  milliGRAM(s) Oral every 12 hours PRN Cough  meclizine 25 milliGRAM(s) Oral three times a day PRN Dizziness      VITAL SIGNS  T(F): 97.9 (05-05 @ 12:37), Max: 97.9 (05-05 @ 12:37)  HR: 90 (05-05 @ 12:37) (72 - 95)  BP: 130/60 (05-05 @ 12:37) (117/64 - 130/60)  RR: 18 (05-05 @ 12:37) (18 - 19)  SpO2: 95% (05-05 @ 09:49) (84% - 97%)    LABS:                        11.4   6.81  )-----------( 229      ( 05 May 2022 04:30 )             33.9     05-05    131<L>  |  94<L>  |  13  ----------------------------<  97  4.3   |  27  |  0.8    Ca    8.7      05 May 2022 04:30  Mg     1.9     05-05    TPro  6.0  /  Alb  3.6  /  TBili  0.5  /  DBili  x   /  AST  16  /  ALT  13  /  AlkPhos  84  05-04    PT/INR - ( 04 May 2022 12:00 )   PT: 10.90 sec;   INR: 0.95 ratio           Culture - Fungal, Bronchial (collected 04 May 2022 08:15)  Source: .Bronchial None  Preliminary Report:    Testing in progress    Culture - Bronchial (collected 04 May 2022 08:15)  Source: .Bronchial None  Gram Stain:    Rare polymorphonuclear leukocytes seen per low power field    Rare Squamous epithelial cells seen per low power field    No organisms seen per oil power field        RADIOLOGY:   Reviewed  < from: CT Head No Cont (05.02.22 @ 05:10) >  No evidence of intracranial hemorrhage, territorial infarct, or mass   effect.    < end of copied text >    < from: CT Chest w/ IV Cont (05.02.22 @ 13:57) >  Ill-defined masslike structure within the left upper lung contiguous with   the mediastinum and left hilum. Mass effect upon the left upper lobe and   left lower lobe bronchi with areas of atelectasis. Mass extends to the   posterior aspect of the left chest wall. Atelectasis within the right   lower lung. Subcentimeter mediastinal adenopathy.    < end of copied text >    < from: CT Angio Neck w/ IV Cont (05.03.22 @ 18:25) >  1.  Right carotid: Post CEA appearance of the right carotid bifurcation   without significant stenosis.    2.  Left carotid: Extensive atheromatous changes of the common carotid   artery with a level of severe (70-80%) stenosis at the mid CCA. Densely   calcified plaque at the carotid bulb with severe (70-80%) stenosis.    3.  Redemonstrated large irregular infiltrative lung mass in the left   upper lobe, see dedicated CT chest from the prior day.    < end of copied text >      PHYSICAL EXAM:  General: Comfortably sitting in hospital chair. NAD. AAOx3. [place].   HEENT: Atraumatic. Normocephalic. EOMI. Conjunctiva and sclera clear.  Cardiac: Normal S1, S2. RRR. No murmurs, rubs, or gallops.  Pulm: Decreased basilar bs b/l. on 3L NC.  GI: Soft, nontender, nondistended.   Ext: 2+ pulses. Moving all 4 extremities. No edema, cyanosis.  Neuro: CN II-XII grossly intact  Skin: No lesions or rashes

## 2022-05-05 NOTE — PROGRESS NOTE ADULT - ASSESSMENT
ASSESSMENT & PLAN  Mr. Escobedo is a 85yo b/l [place] M w/ PMHx of HTN, HLD, Chronic Vertigo, Pre-DM and Carotid artery stenosis s/p L Carotid endarterectomy BIBA alerted by son for Veritgo and feeling unwell. As per son Pt was feeling dizzy yesterday afternoon w/ associated weakness. Symptoms was resolved after taking his Meclizine. This morning symptoms return associated w/ chills, nausea and coughing up tick clear phlegm. Pt told his son he was unwell and need to go to the hospital.    # Chronic vertigo  - pt reported acute worsening prior to admission  - cont meclizine 25mg q8h PRN  - outpatient neuro f/u  - obtain MRI brain w madelyn in setting of newly diagnosed lung mass and acute worsening of vertigo    # MARILEE lung mass  - saturating well on RA, currently no pulmonary complaints  - CXR showed L hilar mass  - CT chest with illdefined masslike structure within MARILEE contiguous with mediastinum and L hilum  - former smoker, quit >40 yrs ago  - s/p pulm eval, bronchoscopy and EBUS biopsy 5/4  - f/u path  - MRI head as above  - pt will need home oxygen and hospital bed    # Bilateral carotid artery stenosis s/p R CEA  - US carotid artery with extensive atherosclerosis and near complete occlusion of L carotid bulb  - s/p vascular eval  - CTA neck reviewed - R carotid s/p CEA no significant stenosis; L carotid extensive atheromatous changes of common carotid w 70-80% stenosis, densely calcified plaque at the carotid bulb w 70-80% stenosis  - d/w vascular pt wound benefit from CEA, however will await bx path    # Normocytic anemia, stable  - Hgb 11.8->11.2->11.9->11.4  - no s/s bleeding  - check iron profile    # Hyponatremia  - Na 132->133->137->131  - 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 F/u Pathology   ASSESSMENT & PLAN  Mr. Escobedo is a 85yo b/l [place] M w/ PMHx of HTN, HLD, Chronic Vertigo, Pre-DM and Carotid artery stenosis s/p L Carotid endarterectomy BIBA alerted by son for Veritgo and feeling unwell. As per son Pt was feeling dizzy yesterday afternoon w/ associated weakness. Symptoms was resolved after taking his Meclizine. This morning symptoms return associated w/ chills, nausea and coughing up tick clear phlegm. Pt told his son he was unwell and need to go to the hospital.    # Chronic vertigo  - pt reported acute worsening prior to admission  - cont meclizine 25mg q8h PRN  - outpatient neuro f/u  - obtain MRI brain w madelyn in setting of newly diagnosed lung mass and acute worsening of vertigo    # MARILEE lung mass  - saturating well on RA, currently no pulmonary complaints  - CXR showed L hilar mass  - CT chest with illdefined masslike structure within MARILEE contiguous with mediastinum and L hilum  - former smoker, quit >40 yrs ago  - s/p pulm eval, bronchoscopy and EBUS biopsy 5/4  - f/u path  - MRI head as above  - pt will need home oxygen and hospital bed    # Bilateral carotid artery stenosis s/p R CEA  - US carotid artery with extensive atherosclerosis and near complete occlusion of L carotid bulb  - s/p vascular eval  - CTA neck reviewed - R carotid s/p CEA no significant stenosis; L carotid extensive atheromatous changes of common carotid w 70-80% stenosis, densely calcified plaque at the carotid bulb w 70-80% stenosis  - d/w vascular pt wound benefit from CEA, however will await bx path  - start [id number]    # Normocytic anemia, stable  - Hgb 11.8->11.2->11.9->11.4  - no s/s bleeding  - check iron profile    # Hyponatremia  - Na 132->133->137->131  - 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 F/u Pathology

## 2022-05-05 NOTE — DISCHARGE NOTE PROVIDER - CARE PROVIDER_API CALL
Ilir Fletcher (MD)  Critical Care Medicine; Pulmonary Disease; Sleep Medicine  [address] [place] [address]  Phone: [phone]  Fax: [phone]  Follow Up Time: 1 week    Gautam Newman (DO)  Medicine  [address] [facility], [address]st [place] [address]  Phone: [phone]  Fax: [phone]  Follow Up Time: 2 weeks   Ilir Fletcher)  Critical Care Medicine; Pulmonary Disease; Sleep Medicine  [address] [place] [address]  Phone: [phone]  Fax: [phone]  Follow Up Time: 1 week    Gautam Newman (DO)  Medicine  [address] [place] [address], [address] [place] [address]  Phone: [phone]  Fax: [phone]  Follow Up Time: 2 weeks    Gennaro Arboleda)  Surgery; Vascular Surgery  [address] [place] [address]  Phone: [phone]  Fax: [phone]  Follow Up Time: 1 month

## 2022-05-06 NOTE — PROGRESS NOTE ADULT - ASSESSMENT
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

## 2022-05-06 NOTE — DISCHARGE NOTE NURSING/CASE MANAGEMENT/SOCIAL WORK - PATIENT PORTAL LINK FT
You can access the FollowMyHealth Patient Portal offered by [facility] by registering at the following website: [email or web address]. By joining [facility]’s FollowMyHealth portal, you will also be able to view your health information using other applications (apps) compatible with our system.

## 2022-05-06 NOTE — PROGRESS NOTE ADULT - ASSESSMENT
Mr. Saldaña is a 87yo b/l [place] M w/ PMHx of HTN, HLD, Chronic Vertigo, Pre-DM and Carotid artery stenosis s/p L Carotid endarterectomy BIBA alerted by son for Veritgo and feeling unwell.    # pt's son recently  of sudden, massive MI; pt used to live w/ this son  pt will live w/ his other son, Jack, now  pt expressing nl grief at this point, though news is still new ( was )    # Letter of Medical Necessity for Home Oxygen  KEIKO SALDAÑA, 86y years old Male admitted to the hospital with the diagnosis of PNEUMONIA VERTIGO.  Dx(s) for O2 is: Lung mass - cancer suspected, s/p bx  At rest, room air SpO2 is _95%___ and SpO2 on O2 at _3__ lpm via nasal cannula is _97%___.  While ambulating, room air SpO2 is _84%____ and SpO2 on O2 at _3__ lpm via nasal cannula is _95%___.  Patient is aware that he will be going home on oxygen. Patient was tested in chronic, stable state.   Rx written and given to CM    # Letter of Medical Necessity for Hosp Bed  Pain: It is med necessary for this pt to receive a hosp bed for lung mass w/ secretions and new acute resp failure requiring home O2. This pt requires body positioning in ways not feasible w/ an ordinary bed to alleviate pain. This pt requires the head of bed to be elevated > 30 degrees most of the time due to pain. Pillows and wedges have been considered and ruled out. This pt requires body positioning in ways not feasible w/ an ordinary bed to alleviate dyspnea.   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; poss lymphangitic spread; subcm chest LN; lt lung atelectasis  acute hypoxic resp failure  holding off on abx  pt had bleeding during bronch (per Dr Fletcher): this explains desat along w/ cough, phlegm  pt will need home O2  incent carlos to bedside  EBUS: friable mucosa noted; LN bx done  cancer was suspected, but path of MARILEE loan is neg (f/u LN bx)  GS, CX of BAL are neg  c/w mucinex 600mg po q12  if develops hemoptysis, then hold DVT ppx    # b/l carotid dz; hx of Rt CEA  vasc noted  CTA neck w/ contrast: Rt CEA is patent; Lt mid CCA and carotid bulb w/ 70-80% stenosis;  I would NOT operate (or clear pt) until bx of lung lesion is known (carotid sx not indicated if pt has lung cancer)  c/w asa 81mg po q24   pt also on statin  would do echo if going for sx -> as outpt    # Chronic Vertigo  MRI B w/ gado: no mets; no stroke; chr microvasc changes  c/w Meclizine 25mg Q8 PRN, though dizziness/vertigo seems better w/ oxygen  outpt neuro f/u        # HTN  c/w lisinopril 10mg po q24    # Normocytic Anemia  no further inpt w/u    # Hyponatremia - mild; better  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 Saldaña is a 87yo b/l [place] M w/ PMHx of HTN, HLD, Chronic Vertigo, Pre-DM and Carotid artery stenosis s/p L Carotid endarterectomy BIBA alerted by son for Veritgo and feeling unwell.    # pt's son recently  of sudden, massive MI; pt used to live w/ this son  pt will live w/ his other son, Jack, now  pt expressing nl grief at this point, though news is still new ( was )    # Letter of Medical Necessity for Home Oxygen  KEIKO SALDAÑA, 86y years old Male admitted to the hospital with the diagnosis of PNEUMONIA VERTIGO.  Dx(s) for O2 is: Lung mass - cancer suspected, s/p bx  At rest, room air SpO2 is _95%___ and SpO2 on O2 at _3__ lpm via nasal cannula is _97%___.  While ambulating, room air SpO2 is _84%____ and SpO2 on O2 at _3__ lpm via nasal cannula is _95%___.  Patient is aware that he will be going home on oxygen. Patient was tested in chronic, stable state.   Rx written and given to CM    # Letter of Medical Necessity for Hosp Bed  Pain: It is med necessary for this pt to receive a hosp bed for lung mass w/ secretions and new acute resp failure requiring home O2. This pt requires body positioning in ways not feasible w/ an ordinary bed to alleviate pain. This pt requires the head of bed to be elevated > 30 degrees most of the time due to pain. Pillows and wedges have been considered and ruled out. This pt requires body positioning in ways not feasible w/ an ordinary bed to alleviate dyspnea.   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; poss lymphangitic spread; subcm chest LN; lt lung atelectasis  acute hypoxic resp failure  holding off on abx  pt had bleeding during bronch (per Dr Fletcher): this explains desat along w/ cough, phlegm  pt will need home O2  incent carlos to bedside  EBUS: friable mucosa noted; LN bx done  cancer was suspected, but path of MARILEE loan is neg (f/u LN bx)  GS, CX of BAL are neg  c/w mucinex 600mg po q12  if develops hemoptysis, then hold DVT ppx    # b/l carotid dz; hx of Rt CEA  vasc noted  CTA neck w/ contrast: Rt CEA is patent; Lt mid CCA and carotid bulb w/ 70-80% stenosis;  I would NOT operate (or clear pt) until bx of lung lesion is known (carotid sx not indicated if pt has lung cancer)  c/w asa 81mg po q24   pt also on statin  would do echo if going for sx -> as outpt    # Chronic Vertigo  MRI B w/ gado: no mets; no stroke; chr microvasc changes  c/w Meclizine 25mg Q8 PRN, though dizziness/vertigo seems better w/ oxygen  outpt neuro f/u    # HTN  c/w lisinopril 10mg po q24    # Normocytic Anemia  no further inpt w/u    # Hyponatremia - mild; better  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

## 2022-05-06 NOTE — PROGRESS NOTE ADULT - SUBJECTIVE AND OBJECTIVE BOX
Patient is a 86y old  Male who presents with a chief complaint of Worsening Veritgo x 1 day (05 May 2022 21:11)        Over Night Events:  Feels better.  No hemoptysis.  no SOB at rest         ROS:     All ROS are negative except HPI         PHYSICAL EXAM    ICU Vital Signs Last 24 Hrs  T(C): 36.3 (06 May 2022 06:09), Max: 36.6 (05 May 2022 12:37)  T(F): 97.4 (06 May 2022 06:09), Max: 97.9 (05 May 2022 12:37)  HR: 78 (06 May 2022 06:09) (78 - 90)  BP: 123/78 (06 May 2022 06:09) (115/67 - 130/60)  BP(mean): --  ABP: --  ABP(mean): --  RR: 18 (06 May 2022 06:09) (18 - 18)  SpO2: 97% (05 May 2022 20:49) (84% - 97%)      CONSTITUTIONAL:  In  NAD    ENT:   Airway patent,   Mouth with normal mucosa.     EYES:   Pupils equal,   Round and reactive to light.    CARDIAC:   Normal rate,   Regular rhythm.        RESPIRATORY:   No wheezing  Bilateral crackles   Normal chest expansion  Not tachypneic,  No use of accessory muscles    GASTROINTESTINAL:  Abdomen soft,   Non-tender,   No guarding,   + BS    MUSCULOSKELETAL:   Range of motion is not limited,  No clubbing, cyanosis    NEUROLOGICAL:   Alert  No motor  deficits.    SKIN:   Skin normal color for race,   No evidence of rash.        LABS:                            11.4   6.15  )-----------( 233      ( 06 May 2022 06:30 )             34.3                                               05-06    133<L>  |  97<L>  |  16  ----------------------------<  100<H>  4.6   |  27  |  0.7    Ca    8.6      06 May 2022 06:30  Mg     2.0     05-06    TPro  6.0  /  Alb  3.6  /  TBili  0.5  /  DBili  x   /  AST  16  /  ALT  13  /  AlkPhos  84  05-04      PT/INR - ( 04 May 2022 12:00 )   PT: 10.90 sec;   INR: 0.95 ratio                                                                                              LIVER FUNCTIONS - ( 04 May 2022 12:00 )  Alb: 3.6 g/dL / Pro: 6.0 g/dL / ALK PHOS: 84 U/L / ALT: 13 U/L / AST: 16 U/L / GGT: x                                                  Culture - Fungal, Bronchial (collected 04 May 2022 08:15)  Source: .Bronchial None  Preliminary Report (05 May 2022 08:50):    Testing in progress    Culture - Bronchial (collected 04 May 2022 08:15)  Source: .Bronchial None  Gram Stain (05 May 2022 07:08):    Rare polymorphonuclear leukocytes seen per low power field    Rare Squamous epithelial cells seen per low power field    No organisms seen per oil power field  Preliminary Report (05 May 2022 19:04):    Normal Respiratory Zarina present                                                                                           MEDICATIONS  (STANDING):  aspirin  chewable 81 milliGRAM(s) Oral daily  atorvastatin 80 milliGRAM(s) Oral daily  enoxaparin Injectable 40 milliGRAM(s) Subcutaneous every 24 hours  lisinopril 10 milliGRAM(s) Oral daily    MEDICATIONS  (PRN):  acetaminophen     Tablet .. 650 milliGRAM(s) Oral every 6 hours PRN Temp greater or equal to 38C (100.4F), Mild Pain (1 - 3)  guaiFENesin  milliGRAM(s) Oral every 12 hours PRN Cough  meclizine 25 milliGRAM(s) Oral three times a day PRN Dizziness      New X-rays reviewed:                                                                                  ECHO    CXR interpreted by me:

## 2022-05-06 NOTE — CHART NOTE - NSCHARTNOTEFT_GEN_A_CORE
D/w pt and son at bedside  Pt pending delivery of hospital bed and o2 to son's home  Per son, delivery called and thought destination was pt's home in [place] - corrected to be son's home on [address].  Son now concerned that pt may be discharged too late in the day and is requesting discharge tomorrow.    Will d/w Dr. Johnson.  Will send new meds (ASA81 qd) to pharmacy - son requested any [facility]. Will send to closest to hospital. D/w pt and son at bedside  Pt pending delivery of hospital bed and o2 to son's home  Per son, delivery called and thought destination was pt's home in [place] - corrected to be son's home on [address].  Son now concerned that pt may be discharged too late in the day and is requesting discharge tomorrow.    Will d/w Dr. Johnson.  Will send new meds (ASA81 qd) to pharmacy - son requested any [facility]. Will send to closest to hospital.    Update:  -asa81 x30d sent to [facility] [address] [facility]  -prepared pt for discharge tomorrow

## 2022-05-06 NOTE — PROGRESS NOTE ADULT - SUBJECTIVE AND OBJECTIVE BOX
KEIKO SALDAÑA  86y  Male  ***My note supersedes ALL resident notes that I sign.  My corrections for their notes are in my note.***    I can be reached directly on [employer]. My office number is [phone]. My personal cell number is [phone].    INTERVAL EVENTS: Here for f/u of lung lesion. Pt says his cough is lessening. No SOB. No pain. Feels OK to go home.    T(F): 97.7 (05-06-22 @ 13:49), Max: 97.7 (05-06-22 @ 13:49)  HR: 79 (05-06-22 @ 13:49) (78 - 89)  BP: 104/55 (05-06-22 @ 13:49) (104/55 - 123/78)  RR: 18 (05-06-22 @ 13:49) (18 - 18)  SpO2: 97% (05-05-22 @ 20:49) (97% - 97%)    Gen: NAD; [place] mild; + NC O2 3L  HEENT: PERRL, EOMI, no nystagmus; mouth clr, nose clr  Neck: no nodes, no JVD, thyroid nl; Rt CEA scar  lungs: no wheeze; + crackles on left; decr BS on left; rt fairly clear  hrt: s1 s2 rrr no murmur  abd: soft, NT/ND, no HS megaly  ext: no edema, no c/c  neuro: aa ox3, cn intact, can move all 4 ext;    LABS:                      11.4    (    94.5   6.15  )-----------( ---------      233      ( 06 May 2022 06:30 )             34.3    (    14.6     133   (   97   (   100      05-06-22 @ 06:30  ----------------------               4.6   (   27   (   16                             -----                        0.7  Ca  8.6   Mg  2.0    P   --     Culture - Bronchial (collected 05-04-22 @ 08:15)  Source: .Bronchial None  Gram Stain (05-05-22 @ 07:08):    Rare polymorphonuclear leukocytes seen per low power field    Rare Squamous epithelial cells seen per low power field    No organisms seen per oil power field  Preliminary Report (05-05-22 @ 19:04):    Normal Respiratory Zarina present    Culture - Blood (collected 05-02-22 @ 06:15)  Source: .Blood Blood  Preliminary Report (05-03-22 @ 16:01):    No growth to date.    Culture - Blood (collected 05-02-22 @ 06:15)  Source: .Blood Blood  Preliminary Report (05-03-22 @ 16:01):    No growth to date.    RADIOLOGY & ADDITIONAL TESTS:  < from: MR Head w/wo IV Cont (05.05.22 @ 16:17) >  IMPRESSION:    1.  No MRI evidence of intracranial metastatic disease.    2.  Mild/moderate chronic microvascular changes.    < end of copied text >    MEDICATIONS:    acetaminophen     Tablet .. 650 milliGRAM(s) Oral every 6 hours PRN  aspirin  chewable 81 milliGRAM(s) Oral daily  atorvastatin 80 milliGRAM(s) Oral daily  enoxaparin Injectable 40 milliGRAM(s) SubCutaneous every 24 hours  guaiFENesin  milliGRAM(s) Oral every 12 hours PRN  lisinopril 10 milliGRAM(s) Oral daily  meclizine 25 milliGRAM(s) Oral three times a day PRN

## 2022-05-06 NOTE — PROGRESS NOTE ADULT - SUBJECTIVE AND OBJECTIVE BOX
KEIKO SALDAÑA MRN#: [id number]  CODE STATUS: FULL CODE     SUBJECTIVE   Chief complaint: Worsening Veritgo x 1 day    Currently admitted to medicine with the primary diagnosis of PNEUMONIA VERTIGO      Today is hospital day 4d,   INTERVAL HPI/OVERNIGHT EVENTS: s/p MRI yesterday.    Reports feeling okay this morning. Has no complaints. Feels ready to go home.     Urinating and stooling appropriately.    Present Today:           Zaman Catheter (x)No/ ()Yes?   Indication:             Central Line (x)No/ ()Yes?  Indication:          IV Fluids (x)No/ ()Yes?  Indication:     OBJECTIVE  PAST MEDICAL & SURGICAL HISTORY  Vertigo    HTN (hypertension)    HLD (hyperlipidemia)    History of CEA (carotid endarterectomy)    S/P cataract surgery    H/O umbilical hernia repair      ALLERGIES:  No Known Allergies    HOME MEDICATIONS:  Home Medications:  acetaminophen 325 mg oral tablet: 2 tab(s) orally every 6 hours, As needed, Temp greater or equal to 38C (100.4F), Mild Pain (1 - 3) (05 May 2022 21:20)  atorvastatin 80 mg oral tablet: 1 tab(s) orally once a day (02 May 2022 15:59)  meclizine 25 mg oral tablet, chewable: 1 tab(s) orally 3 times a day, As Needed (02 May 2022 15:59)  ramipril 2.5 mg oral capsule: 1 cap(s) orally once a day (02 May 2022 15:59)    MEDICATIONS:  STANDING MEDICATIONS  MEDICATIONS  (STANDING):  aspirin  chewable 81 milliGRAM(s) Oral daily  atorvastatin 80 milliGRAM(s) Oral daily  enoxaparin Injectable 40 milliGRAM(s) SubCutaneous every 24 hours  lisinopril 10 milliGRAM(s) Oral daily    PRN MEDICATIONS  MEDICATIONS  (PRN):  acetaminophen     Tablet .. 650 milliGRAM(s) Oral every 6 hours PRN Temp greater or equal to 38C (100.4F), Mild Pain (1 - 3)  guaiFENesin  milliGRAM(s) Oral every 12 hours PRN Cough  meclizine 25 milliGRAM(s) Oral three times a day PRN Dizziness      VITAL SIGNS  T(F): 97.7 (05-06 @ 13:49), Max: 97.7 (05-06 @ 13:49)  HR: 79 (05-06 @ 13:49) (78 - 89)  BP: 104/55 (05-06 @ 13:49) (104/55 - 123/78)  RR: 18 (05-06 @ 13:49) (18 - 18)  SpO2: 97% (05-05 @ 20:49) (97% - 97%)    LABS:                        11.4   6.15  )-----------( 233      ( 06 May 2022 06:30 )             34.3     05-06    133<L>  |  97<L>  |  16  ----------------------------<  100<H>  4.6   |  27  |  0.7    Ca    8.6      06 May 2022 06:30  Mg     2.0     05-06        Culture - Fungal, Bronchial (collected 04 May 2022 08:15)  Source: .Bronchial None  Preliminary Report:    Testing in progress    Culture - Bronchial (collected 04 May 2022 08:15)  Source: .Bronchial None  Gram Stain:    Rare polymorphonuclear leukocytes seen per low power field    Rare Squamous epithelial cells seen per low power field    No organisms seen per oil power field  Preliminary Report:    Normal Respiratory Zarina present        RADIOLOGY:   Reviewed  < from: CT Head No Cont (05.02.22 @ 05:10) >  No evidence of intracranial hemorrhage, territorial infarct, or mass   effect.    < end of copied text >    < from: CT Chest w/ IV Cont (05.02.22 @ 13:57) >  Ill-defined masslike structure within the left upper lung contiguous with   the mediastinum and left hilum. Mass effect upon the left upper lobe and   left lower lobe bronchi with areas of atelectasis. Mass extends to the   posterior aspect of the left chest wall. Atelectasis within the right   lower lung. Subcentimeter mediastinal adenopathy.    < end of copied text >    < from: CT Angio Neck w/ IV Cont (05.03.22 @ 18:25) >  1.  Right carotid: Post CEA appearance of the right carotid bifurcation   without significant stenosis.    2.  Left carotid: Extensive atheromatous changes of the common carotid   artery with a level of severe (70-80%) stenosis at the mid CCA. Densely   calcified plaque at the carotid bulb with severe (70-80%) stenosis.    3.  Redemonstrated large irregular infiltrative lung mass in the left   upper lobe, see dedicated CT chest from the prior day.    < end of copied text >    < from: MR Head w/wo IV Cont (05.05.22 @ 16:17) >  1.  No MRI evidence of intracranial metastatic disease.    2.  Mild/moderate chronic microvascular changes.    < end of copied text >      PHYSICAL EXAM:  General: Laying on the hospital bed. NAD. AAOx3. [place].  HEENT: Atraumatic. Normocephalic. EOMI. Conjunctiva and sclera clear.  Cardiac: Normal S1, S2. RRR. No murmurs, rubs, or gallops.  Pulm: Decreased basilar bs. on 2L NC.  GI: Soft, nontender, nondistended.  Ext: Moving all 4 extremities. No edema, cyanosis.  Neuro: CN II-XII grossly intact  Skin: No lesions or rashes

## 2022-05-06 NOTE — PROGRESS NOTE ADULT - ASSESSMENT
ASSESSMENT & PLAN  Mr. Escobedo is a 87yo b/l [place] M w/ PMHx of HTN, HLD, Chronic Vertigo, Pre-DM and Carotid artery stenosis s/p L Carotid endarterectomy BIBA alerted by son for Veritgo and feeling unwell. As per son Pt was feeling dizzy yesterday afternoon w/ associated weakness. Symptoms was resolved after taking his Meclizine. This morning symptoms return associated w/ chills, nausea and coughing up tick clear phlegm. Pt told his son he was unwell and need to go to the hospital.    # Chronic vertigo  - pt reported acute worsening prior to admission  - cont meclizine 25mg q8h PRN  - outpatient neuro f/u  - MRI brain significant for mild/mod chronic microvascular changes    # MARILEE lung mass  - saturating well on RA, currently no pulmonary complaints  - CXR showed L hilar mass  - CT chest with illdefined masslike structure within MARILEE contiguous with mediastinum and L hilum  - former smoker, quit >40 yrs ago  - s/p pulm eval, bronchoscopy and EBUS biopsy 5/4  - f/u path  - MRI brain negative for metastatic disease  - pt will need home oxygen and hospital bed  - will f/u pulm outpatient for biopsy results    # Bilateral carotid artery stenosis s/p R CEA  - US carotid artery with extensive atherosclerosis and near complete occlusion of L carotid bulb  - s/p vascular eval  - CTA neck reviewed - R carotid s/p CEA no significant stenosis; L carotid extensive atheromatous changes of common carotid w 70-80% stenosis, densely calcified plaque at the carotid bulb w 70-80% stenosis  - d/w vascular pt wound benefit from CEA, however will await bx path  - cont [id number]    # Normocytic anemia, stable  - Hgb 11.8->11.2->11.9->11.4->11.4  - no s/s bleeding    # Hyponatremia  - Na 132->133->137->131->133  - possibly SIADH in setting of lung mass  - encourage PO intake  - RD eval appreciated    # HTN  # HLD  - BP: 104/55 (104/55 - 123/78)  - on ramipril 2.5mg qd at home  - cont as lisinopril 10mg qd here  - cont atorvastatin 80mg qhs  - lipid profile wnl    PT/REHAB: PT  ACTIVITY: Activity - Increase As Tolerated  DVT PPX: enoxaparin Injectable  GI PPX:  n/a  DIET: Diet, DASH/TLC  CODE: full  Disposition: dc today if home o2 arranged  Pending: biopsy results

## 2022-05-06 NOTE — DISCHARGE NOTE NURSING/CASE MANAGEMENT/SOCIAL WORK - NSDCPEFALRISK_GEN_ALL_CORE
For information on Fall & Injury Prevention, visit: [email or web address] OR  [email or web address] OR  https://www.cdc.gov/steadi/patient.html

## 2022-05-07 NOTE — PROGRESS NOTE ADULT - SUBJECTIVE AND OBJECTIVE BOX
INTERVAL HPI/OVERNIGHT EVENTS:    SUBJECTIVE: Patient seen and examined at bedside.     no cp, sob, abd pain, fever  no sob, orthopnea, pnd, cough    OBJECTIVE:    VITAL SIGNS:  Vital Signs Last 24 Hrs  T(C): 35.8 (07 May 2022 05:28), Max: 36.5 (06 May 2022 13:49)  T(F): 96.4 (07 May 2022 05:28), Max: 97.7 (06 May 2022 13:49)  HR: 77 (07 May 2022 05:28) (74 - 79)  BP: 109/55 (07 May 2022 05:28) (104/55 - 117/54)  BP(mean): --  RR: 19 (07 May 2022 05:28) (18 - 19)  SpO2: 98% (07 May 2022 08:02) (97% - 98%)      PHYSICAL EXAM:    General: NAD  HEENT: NC/AT; PERRL, clear conjunctiva  Neck: supple  Respiratory: CTA b/l  Cardiovascular: +S1/S2; RRR  Abdomen: soft, NT/ND; +BS x4  Extremities: WWP, 2+ peripheral pulses b/l; no LE edema  Skin: normal color and turgor; no rash  Neurological:    MEDICATIONS:  MEDICATIONS  (STANDING):  aspirin  chewable 81 milliGRAM(s) Oral daily  atorvastatin 80 milliGRAM(s) Oral daily  enoxaparin Injectable 40 milliGRAM(s) SubCutaneous every 24 hours  lisinopril 10 milliGRAM(s) Oral daily    MEDICATIONS  (PRN):  acetaminophen     Tablet .. 650 milliGRAM(s) Oral every 6 hours PRN Temp greater or equal to 38C (100.4F), Mild Pain (1 - 3)  guaiFENesin  milliGRAM(s) Oral every 12 hours PRN Cough  meclizine 25 milliGRAM(s) Oral three times a day PRN Dizziness      ALLERGIES:  Allergies    No Known Allergies    Intolerances        LABS:                        12.0   6.88  )-----------( 277      ( 07 May 2022 04:30 )             36.1     Hemoglobin: 12.0 g/dL (05-07 @ 04:30)  Hemoglobin: 11.4 g/dL (05-06 @ 06:30)  Hemoglobin: 11.4 g/dL (05-05 @ 04:30)  Hemoglobin: 11.9 g/dL (05-04 @ 12:00)  Hemoglobin: 11.2 g/dL (05-03 @ 07:40)    CBC Full  -  ( 07 May 2022 04:30 )  WBC Count : 6.88 K/uL  RBC Count : 3.81 M/uL  Hemoglobin : 12.0 g/dL  Hematocrit : 36.1 %  Platelet Count - Automated : 277 K/uL  Mean Cell Volume : 94.8 fL  Mean Cell Hemoglobin : 31.5 pg  Mean Cell Hemoglobin Concentration : 33.2 g/dL  Auto Neutrophil # : 4.60 K/uL  Auto Lymphocyte # : 1.52 K/uL  Auto Monocyte # : 0.61 K/uL  Auto Eosinophil # : 0.09 K/uL  Auto Basophil # : 0.04 K/uL  Auto Neutrophil % : 66.8 %  Auto Lymphocyte % : 22.1 %  Auto Monocyte % : 8.9 %  Auto Eosinophil % : 1.3 %  Auto Basophil % : 0.6 %    05-07    135  |  99  |  21<H>  ----------------------------<  102<H>  4.7   |  28  |  0.9    Ca    8.4<L>      07 May 2022 04:30  Mg     2.0     05-07    TPro  5.9<L>  /  Alb  3.4<L>  /  TBili  0.5  /  DBili  x   /  AST  24  /  ALT  17  /  AlkPhos  76  05-07    Creatinine Trend: 0.9<--, 0.7<--, 0.8<--, 0.7<--, 0.8<--, 0.7<--  LIVER FUNCTIONS - ( 07 May 2022 04:30 )  Alb: 3.4 g/dL / Pro: 5.9 g/dL / ALK PHOS: 76 U/L / ALT: 17 U/L / AST: 24 U/L / GGT: x               hs Troponin:              CSF:                      EKG:   MICROBIOLOGY:    IMAGING:      Labs, imaging, EKG personally reviewed    RADIOLOGY & ADDITIONAL TESTS: Reviewed.

## 2022-05-07 NOTE — PROGRESS NOTE ADULT - NUTRITIONAL ASSESSMENT
This patient has been assessed with a concern for Malnutrition and has been determined to have a diagnosis/diagnoses of Moderate protein-calorie malnutrition.    This patient is being managed with:   Diet DASH/TLC-  Sodium & Cholesterol Restricted  1500mL Fluid Restriction ([id number])  Free Water Flush Instructions:  ***ENSURE COMPACT - CHOCOLATE FLAVOR***BOOST SUBSTITUTED FOR ENSURE PUDDING***THANK  YOU  Supplement Feeding Modality:  Oral  Ensure Pudding Cans or Servings Per Day:  1       Frequency:  Three Times a day  Ensure Compact Cans or Servings Per Day:  1       Frequency:  Three Times a day  Entered: May  4 2022  2:19PM    
This patient has been assessed with a concern for Malnutrition and has been determined to have a diagnosis/diagnoses of Moderate protein-calorie malnutrition.    This patient is being managed with:   Diet DASH/TLC-  Sodium & Cholesterol Restricted  1500mL Fluid Restriction ([id number])  Free Water Flush Instructions:  ***ENSURE COMPACT - CHOCOLATE FLAVOR***BOOST SUBSTITUTED FOR ENSURE PUDDING***THANK  YOU  Supplement Feeding Modality:  Oral  Ensure Pudding Cans or Servings Per Day:  1       Frequency:  Three Times a day  Ensure Compact Cans or Servings Per Day:  1       Frequency:  Three Times a day  Entered: May  4 2022  2:19PM    
This patient has been assessed with a concern for Malnutrition and has been determined to have a diagnosis/diagnoses of Moderate protein-calorie malnutrition.    This patient is being managed with:   Diet DASH/TLC-  Sodium & Cholesterol Restricted  1500mL Fluid Restriction ([id number])  Free Water Flush Instructions:  ***ENSURE COMPACT - CHOCOLATE FLAVOR***BOOST SUBSTITUTED FOR ENSURE PUDDING***THANK  YOU  Supplement Feeding Modality:  Oral  Ensure Pudding Cans or Servings Per Day:  1       Frequency:  Three Times a day  Ensure Compact Cans or Servings Per Day:  1       Frequency:  Three Times a day  Entered: May  4 2022  2:19PM    
This patient has been assessed with a concern for Malnutrition and has been determined to have a diagnosis/diagnoses of Moderate protein-calorie malnutrition.    This patient is being managed with:   Diet DASH/TLC-  Sodium & Cholesterol Restricted  1500mL Fluid Restriction ([id number])  Free Water Flush Instructions:  ***ENSURE COMPACT - CHOCOLATE FLAVOR***BOOST SUBSTITUTED FOR ENSURE PUDDING***THANK  YOU  Supplement Feeding Modality:  Oral  Ensure Pudding Cans or Servings Per Day:  1       Frequency:  Three Times a day  Ensure Compact Cans or Servings Per Day:  1       Frequency:  Three Times a day  Entered: May  4 2022  2:19PM

## 2022-05-07 NOTE — PROGRESS NOTE ADULT - TIME BILLING
86M PMHx HTN, carotid stenosis s/p R CEA here with dizziness, found to have L lung mass.    #Acute hypoxic resp failure; due to L lung mass  ct with mass, atelectasis  s/p bronch/ EBUS 5/4 with bx  path of loan unrevealing  cyto neg  home o2 delivered  stable for d/c, needs outpt pmd, pulm, vasc, onc f/u one week  #Dizziness  resolved  cth, mri unrevealing  cta neck with patent R ICA; L ICA 70-80% stenosis  outpt f/u per vasc  #HTN  lisinopril 10  #Carotid stenosis  asa  lipitor 80  #DVT ppx  lovenox

## 2022-05-07 NOTE — PROGRESS NOTE ADULT - PROVIDER SPECIALTY LIST ADULT
Internal Medicine
Pulmonology
Pulmonology
Internal Medicine

## 2022-05-07 NOTE — PROGRESS NOTE ADULT - REASON FOR ADMISSION
Worsening Veritgo x 1 day
Worsening Veritgo
Worsening Veritgo x 1 day
N/A

## 2022-05-09 PROBLEM — Z00.00 ENCOUNTER FOR PREVENTIVE HEALTH EXAMINATION: Status: ACTIVE | Noted: 2022-01-01

## 2022-05-16 PROBLEM — I10 ESSENTIAL (PRIMARY) HYPERTENSION: Chronic | Status: ACTIVE | Noted: 2022-01-01

## 2022-05-16 PROBLEM — E78.5 HYPERLIPIDEMIA, UNSPECIFIED: Chronic | Status: ACTIVE | Noted: 2022-01-01

## 2022-05-16 PROBLEM — R42 DIZZINESS AND GIDDINESS: Chronic | Status: ACTIVE | Noted: 2022-01-01

## 2022-05-17 NOTE — PROCEDURE
[FreeTextEntry1] : CXR PA and Lateral \par The costophrenic and cardiophrenic angles are sharp\par The radha parenchyma shows no infiltrates or consolidations.  Left hilar density \par No pleural effusions\par

## 2022-05-17 NOTE — REASON FOR VISIT
[Follow-Up - From Hospitalization] : a follow-up visit after a recent hospitalization [TextBox_44] : lung mass

## 2022-05-17 NOTE — HISTORY OF PRESENT ILLNESS
[TextBox_4] : SP hospital stay \par Left hilar mass SP EBUS.  \par Was DCed on O2 \par Feels better

## 2022-06-13 PROBLEM — Z00.00 ENCOUNTER FOR PREVENTIVE HEALTH EXAMINATION: Noted: 2022-01-01

## 2022-06-14 PROBLEM — I65.23 ARTERIOSCLEROSIS OF BOTH CAROTID ARTERIES: Status: ACTIVE | Noted: 2022-01-01

## 2022-06-14 PROBLEM — Z87.891 FORMER SMOKER: Status: ACTIVE | Noted: 2022-01-01

## 2022-06-14 NOTE — H&P PST ADULT - CARDIOVASCULAR
normal/regular rate and rhythm/S1 S2 present/no gallops/no rub/no murmur regular rate and rhythm/S1 S2 present/no gallops/no rub/no murmur

## 2022-06-14 NOTE — ED PROVIDER NOTE - NS ED ATTENDING STATEMENT MOD
This was a shared visit with the DAMIAN. I reviewed and verified the documentation and independently performed the documented:

## 2022-06-14 NOTE — ED PROVIDER NOTE - ATTENDING APP SHARED VISIT CONTRIBUTION OF CARE
I personally evaluated the patient. I reviewed the Resident’s or Physician Assistant’s note (as assigned above), and agree with the findings and plan except as documented in my note.  see MDM  Called cardio for evaluation.  Now in sinus rhythm despite earlier afib.    Signed over second troponin to Dr. Rothman and if ok, can dispo as per cardio.

## 2022-06-14 NOTE — CONSULT NOTE ADULT - ASSESSMENT
New onset Afib, CHADSVASC 3  Lung mass  HTN  DLD  Carotid disease    -repeat EKG STAT  -if no Afib then can d/c home on eliquis. If Afib then admit to telemetry for further workup (TTE, TFTs)  -hold eliquis 3 days before biopsy  -c/w ASA     New onset Afib, CHADSVASC 3  Lung mass  HTN  DLD  Carotid disease    -repeat EKG STAT  -if no Afib then can d/c home on eliquis 5 mg BID. If Afib then admit to telemetry for further workup (TTE, TFTs)  -hold eliquis 3 days before biopsy  -c/w ASA

## 2022-06-14 NOTE — ED PROVIDER NOTE - CARE PROVIDER_API CALL
Kinjal Floyd (MD; MPH)  Internal Medicine  [address] [place] [address], [address]  [place] [address]  Phone: [phone]  Fax: [phone]  Follow Up Time:

## 2022-06-14 NOTE — ED PROVIDER NOTE - CLINICAL SUMMARY MEDICAL DECISION MAKING FREE TEXT BOX
pt s/o to me from Dr. Dempsey- labs ekg reviewed. sp cardiology eval. pt remained in NSR while in ED. rx given for eliquis. Aware of all results, given a copy of all available results, comfortable with discharge and follow-up outpatient, strict return precautions given. Endorses understanding of all of this and aware that they can return at any time for new or concerning symptoms. No further questions or concerns at this time

## 2022-06-14 NOTE — CONSULT NOTE ADULT - ATTENDING COMMENTS
Found to be in AFib at presurgical testing. Currently in sinus rhythm with PACs. Recommend starting apixaban 5 mg BID for stroke prevention., reviewed signs/symptoms of bleeding. MRI brain 5/2022 with no evidence of metastatic disease.    Patient may proceed with lung biopsy. Apixaban may be held 3 days prior to lung biopsy.     F/u with me in 2-4 weeks    Jada Floyd MD  Vascular Cardiology Attending  Please text or call via MS Teams with questions

## 2022-06-14 NOTE — H&P PST ADULT - HISTORY OF PRESENT ILLNESS
PT PRESENTS TO PAST WITH NO SOB, CP, PALPITATIONS, DYSURIA, UTI OR URI AT PRESENT.   PT UNABLE TO WALK UP  FLIGHTS OF STEPS WITH NO SOB. PT AMBULATES TO PAST USING A WALKER.   EXERCISE TOLERANCE- LIMITED   AS PER THE PT, THIS IS HIS/HER COMPLETE MEDICAL AND SURGICAL HX, INCLUDING MEDICATIONS PRESCRIBED AND OVER THE COUNTER  pt denies any covid s/s, or tested positive in the past--PT AWARE OF DATE AND TIME OF COVID TESTING PRIOR TO SURGERY.  pt advised self quarantine till day of procedure  denies travel outside the [place] in the past 30 days  Anesthesia Alert  NO--Difficult Airway  NO--History of neck surgery or radiation  NO--Limited ROM of neck  NO--History of Malignant hyperthermia  NO--Personal or family history of Pseudocholinesterase deficiency  NO--Prior Anesthesia Complication  NO--Latex Allergy  NO--Loose teeth  NO--History of Rheumatoid Arthritis  NO--AROLDO  NO BLEEDING RISK  NO--Other_____

## 2022-06-14 NOTE — H&P PST ADULT - REASON FOR ADMISSION
Proceduralist: Peng Duckworth  Procedure: Lung Bx   Procedure: 60 Minutes  PT REPORTS--I HAVE A MASS ON THE LEFT SIDE OF MY LUNG.  I JUST FOUND OUT ABOUT IT 1 MONTH AGO.  I HAVE NO PAIN.

## 2022-06-14 NOTE — H&P PST ADULT - ADDITIONAL PE
ekg- hr-113 -pt in a fib-  Spoke with Lakeisha reid from [facility]- PT GOING TO ED- VIA AMBULANCE 911.   PT DENIES HAVING ANY CP OR CARDIAC DISTRESS.  SIGNS & SYMPTOMS OF CARDIAC DISTRESS REVIEWED WITH PT AND HIS SON.   PT CONTINUES TO DENY ANY S/S OF CARDIAC ARREST. ekg- hr-113 -pt in a fib-  Spoke with Lakeisha reid from [facility]- PT ADVISED TO GO TO ED.  PT AND SON ARE IN AGREEMENT. PT GOING TO ED- VIA AMBULANCE 911.   PT DENIES HAVING ANY CP OR CARDIAC DISTRESS.  SIGNS & SYMPTOMS OF CARDIAC DISTRESS REVIEWED WITH PT AND HIS SON.   PT CONTINUES TO DENY ANY S/S OF CARDIAC ARREST.    REPORTS- GIVEN TO ED CHARGE NURSE-ARTURO. ekg- hr-113 -pt in a fib-  Informed  Lakeisha reid from [facility]- PT ADVISED TO GO TO ED.  PT AND SON ARE IN AGREEMENT. PT GOING TO ED- VIA AMBULANCE 911.   PT DENIES HAVING ANY CP OR CARDIAC DISTRESS.  SIGNS & SYMPTOMS OF CARDIAC DISTRESS REVIEWED WITH PT AND HIS SON.   PT CONTINUES TO DENY ANY S/S OF CARDIAC ARREST.    REPORTS- GIVEN TO ED CHARGE NURSE-ARTURO.

## 2022-06-14 NOTE — ED PROVIDER NOTE - NS ED ROS FT
Constitutional: no fever, chills, no recent weight loss, change in appetite or malaise  Eyes: no redness/discharge/pain/vision changes  ENT: no rhinorrhea/ear pain/sore throat  Cardiac: No chest pain, SOB or edema.  Respiratory: No cough or respiratory distress  GI: No nausea, vomiting, diarrhea or abdominal pain.  : No dysuria, frequency, urgency or hematuria  MS: no pain to back or extremities, no loss of ROM, no weakness  Neuro: No headache or weakness. No LOC.  Skin: No skin rash.  Endocrine: + hx of pre dm  Except as documented in the HPI, all other systems are negative.

## 2022-06-14 NOTE — CONSULT NOTE ADULT - SUBJECTIVE AND OBJECTIVE BOX
Cardiologist: none    HPI:  86M with HTN, DLD, hx CEA on R and vertigo presenting from Pre-Op for irregular heatbeat. He was planned to go for an lung biopsy 6/20/22. Denied fever, chills, n/v/d/c, cp,  pleuritic cp, SOB, palpitations, diaphoresis, cough, wheezing, hemoptysis, HA, blurry vision, dizziness, lightheadedness, numbness, tingling, neck pain, neck stiffness, abdominal pain, melena, BRBPR, hematuria, urinary incontinace, trauma, calf pain/swelling/erythema, sick contacts, recent travel or rash.    In ED, found to be in Afib which spontaneously converted to NSR    PAST MEDICAL & SURGICAL HISTORY  Vertigo    HTN (hypertension)    HLD (hyperlipidemia)    History of CEA (carotid endarterectomy)    S/P cataract surgery    H/O umbilical hernia repair        FAMILY HISTORY:  FAMILY HISTORY:  FH: HTN (hypertension) (Mother)    FH: lung cancer (Father)    FHx: myocardial infarction (Child)      [ ] no pertinent family history of premature cardiovascular disease in first degree relatives.  Mother:   Father:   Siblings:     SOCIAL HISTORY:  []smoker  []Alcohol  []Drug    ALLERGIES:  No Known Allergies    MEDICATIONS:  MEDICATIONS  (STANDING):    MEDICATIONS  (PRN):      HOME MEDICATIONS:  Home Medications:  acetaminophen 325 mg oral tablet: 2 tab(s) orally every 6 hours, As needed, Temp greater or equal to 38C (100.4F), Mild Pain (1 - 3) (05 May 2022 21:20)  atorvastatin 80 mg oral tablet: 1 tab(s) orally once a day (02 May 2022 15:59)  meclizine 25 mg oral tablet, chewable: 1 tab(s) orally 3 times a day, As Needed (02 May 2022 15:59)  ramipril 2.5 mg oral capsule: 1 cap(s) orally once a day (02 May 2022 15:59)    VITALS:   T(F): 98.2 (06-14 @ 16:00), Max: 98.2 (06-14 @ 16:00)  HR: 93 (06-14 @ 16:00) (78 - 93)  BP: 146/65 (06-14 @ 16:00) (104/53 - 146/65)  BP(mean): 79 (06-14 @ 12:22) (79 - 79)  RR: 18 (06-14 @ 16:00) (15 - 20)  SpO2: 99% (06-14 @ 16:00) (97% - 99%)    REVIEW OF SYSTEMS:    Negative except as mentioned in HPI    PHYSICAL EXAM:  NEURO: patient is awake , alert and oriented  GEN: Not in acute distress  NECK: no thyroid enlargement, no JVD  LUNGS: course bilaterally   CARDIOVASCULAR: irregular  ABD: Soft, non-tender, non-distended, +BS  EXT: No PAT  SKIN: Intact    LABS:                        12.1   7.76  )-----------( 295      ( 14 Jun 2022 15:03 )             36.0     06-14    132<L>  |  98  |  12  ----------------------------<  99  4.9   |  19  |  0.9    Ca    9.4      14 Jun 2022 15:03    TPro  6.6  /  Alb  3.6  /  TBili  0.5  /  DBili  x   /  AST  22  /  ALT  15  /  AlkPhos  104  06-14      Troponin T, Serum: 0.02 ng/mL *H* (06-14-22 @ 15:03)    CARDIAC MARKERS ( 14 Jun 2022 15:03 )  x     / 0.02 ng/mL / x     / x     / x         05-03 Chol 126 LDL -- HDL 70 Trig 60      RADIOLOGY:  -CXR:  < from: Xray Chest 1 View- PORTABLE-Urgent (Xray Chest 1 View- PORTABLE-Urgent .) (05.04.22 @ 15:59) >  Impression:    Bilateral opacifications, worsening.    < end of copied text >    -TTE:    -CCTA    -STRESS TEST:    -CATHETERIZATION:    ECG:  Afib with RVR    sinus rhythm    TELEMETRY EVENTS: sinus with frequent PACs

## 2022-06-14 NOTE — HISTORY OF PRESENT ILLNESS
[FreeTextEntry1] : 85 y/o gentleman with h/o R CEA in [place] about 10 years ago for high grade carotid stenosis, no h/o CVA or TIA, recently at [facility] for dizziness, had a CTA of neck that showed patent right CEA site and 70-80% left carotid stenosis. He was also found to have a left lung mass and is scheduled for biopsy next week.

## 2022-06-14 NOTE — ASSESSMENT
[FreeTextEntry1] : 87 y/o gentleman with high grade left carotid stenosis, asymptomatic. He was diagnosed with left lung mass and is scheduled for biopsy next week.\par \par I have explained to him that the vertigo and dizziness symptoms are not related to left carotid artery stenosis and that may be due to low blood pressure as noted today. I advised follow up with PMD for medication adjustment. Considering his comorbidities I recommend conservative management and I will see him back in 3 months for followup.\par \par I spent 45 minutes with patient performing physical exam, reviewing CTA findings, discussing treatment plan and followup.\par

## 2022-06-14 NOTE — ED PROVIDER NOTE - PATIENT PORTAL LINK FT
You can access the FollowMyHealth Patient Portal offered by [facility] by registering at the following website: [email or web address]. By joining [employer]’s FollowMyHealth portal, you will also be able to view your health information using other applications (apps) compatible with our system.

## 2022-06-20 NOTE — ED PROVIDER NOTE - OBJECTIVE STATEMENT
87 y/o M former smoker, with PMH HTN, HLD, hx CEA, lung mass dx 1 month ago, presenting to ED from IR suite in A fib RVR. Pt was at IR for lung mass biopsy when he was found to be in A fib RVR. Dx with A fib at presurgical testing on 6/14; was started on Eliquis and aspirin at that time but has been off both for past three days for today's procedure. Pt denies any chest pain, difficulties breathing, abd pain, recent fever, vomiting, diarrhea, cough, congestion. Accompanied by son

## 2022-06-20 NOTE — ED ADULT NURSE NOTE - OBJECTIVE STATEMENT
Patient was sent by IR for irregular rapid afib and hypotension. Patient denies any chest pain or discomfort. No signs of distress noted.

## 2022-06-20 NOTE — H&P ADULT - NSHPLABSRESULTS_GEN_ALL_CORE
12.0   7.15  )-----------( 275      ( 20 Jun 2022 08:43 )             35.4   06-20    133<L>  |  98  |  13  ----------------------------<  102<H>  4.5   |  28  |  0.8    Ca    9.3      20 Jun 2022 08:43    TPro  6.4  /  Alb  3.6  /  TBili  0.5  /  DBili  x   /  AST  20  /  ALT  17  /  AlkPhos  102  06-20 12.0   7.15  )-----------( 275      ( 20 Jun 2022 08:43 )             35.4   06-20    133<L>  |  98  |  13  ----------------------------<  102<H>  4.5   |  28  |  0.8    Ca    9.3      20 Jun 2022 08:43    TPro  6.4  /  Alb  3.6  /  TBili  0.5  /  DBili  x   /  AST  20  /  ALT  17  /  AlkPhos  102  06-20    < from: Xray Chest 1 View AP/PA (06.20.22 @ 12:44) >    Impression:    Low lung volumes. Left lung opacities redemonstrated. See prior PET/CT   for detailed evaluation.    < end of copied text >

## 2022-06-20 NOTE — ED PROVIDER NOTE - ATTENDING CONTRIBUTION TO CARE
86-year-old male history of hypertension dyslipidemia here for evaluation of rapid A. fib.  Patient went to preadmission testing last week was found to be in A. fib started on Eliquis and aspirin.  Patient today was plan to have a lung biopsy found to be in A. fib with RVR.  Patient has no chest pain shortness of breath palpitations fever chills.  Here on exam patient no distress S1-S2 regular and tacky clear to auscultation bilaterally soft nontender no calf swelling  Impression  Patient here with rapid A. fib bedside echo shows normal EF we will give Cardizem.

## 2022-06-20 NOTE — H&P ADULT - NS ATTEND AMEND GEN_ALL_CORE FT
Agree with above history. Patient is undergoing work-up of lung mass, and was sent to the ED for asymptomatic Afib with RVR. ECG with Aflutter. TSH slightly elevated at 4.45. No prior TTE. Patient started on diltiazem gtt at 5 mg/hr, with improvement in HR to 100-110 bpm. He was given 1-dose of ASA 81 mg in the ED, and now must wait until Monday 6/27 for FNA.     Given the one week delay for his FNA, will adjust the above plan as indicated below:    - Start diltiazem 30 mg PO q6h  - Stop diltiazem gtt 1 hour after PO medication given  - Will restart Eliquis 5 mg BID, and plan to hold at least 4 doses prior to FNA (last dose on Friday 6/24 PM)  - Discontinue ASA until FNA done  - Hold ACEi as patient with relatively low BP while in Afib/flutter and on diltiazem  - Lasix 40 mg IV x 1, then 40 g PO daily  - Check TSH, FT4, and T3  - TTE ordered  - A1c ordered Agree with above history. Patient is undergoing work-up of lung mass, and was sent to the ED for asymptomatic Afib with RVR. ECG with Aflutter. TSH slightly elevated at 4.45. No prior TTE. Patient started on diltiazem gtt at 5 mg/hr, with improvement in HR to 100-110 bpm. He was given 1-dose of ASA 81 mg in the ED, and now must wait until Monday 6/27 for FNA.     Given the one week delay for his FNA, will adjust the above plan as indicated below:    - Start diltiazem 30 mg PO q6h  - Stop diltiazem gtt 1 hour after PO medication given  - Will restart Eliquis 5 mg BID, and plan to hold at least 4 doses prior to FNA (last dose on Friday 6/24 PM)  - Discontinue ASA until FNA done  - Hold ACEi as patient with relatively low BP while in Afib/flutter and on diltiazem  - Lasix 40 mg IV x 1, then 40 g PO daily  - Check TSH, FT4, and T3  - TTE ordered  - CTA for elevated D-dimer and concern for lung cancer  - A1c ordered

## 2022-06-20 NOTE — H&P ADULT - CARDIOVASCULAR
S1 S2 present/no gallops/no rub/no murmur/no JVD/Irregularly irregular rhythm/peripheral edema no gallops/no rub/no JVD/Irregularly irregular rhythm/murmur/peripheral edema

## 2022-06-20 NOTE — H&P ADULT - GASTROINTESTINAL
normal/soft/nontender/nondistended/normal active bowel sounds/no guarding/no rigidity/no organomegaly/no palpable vipul

## 2022-06-20 NOTE — H&P ADULT - RESPIRATORY
normal/clear to auscultation bilaterally/no wheezes/no rales/no rhonchi/no respiratory distress/no use of accessory muscles/respirations non-labored/no intercostal retractions

## 2022-06-20 NOTE — CONSULT NOTE ADULT - ASSESSMENT
ASSESSMENT AND PLAN:    86-year-old male with a history of left upper lobe lung mass, HTN, and HLD presenting with A-fib with AVR. Patient was initially scheduled for left upper lung biopsy with Interventional Radiology, however patient was in a-fib with RVR and hemodynamic instability and unable to obtain procedure.    Recommendations:  -medically optimize patient  -hold aspirin (must be held for at least 5 days prior to biopsy)  -hold Eliquis (hold 4 doses prior to procedure)  -tentative biopsy date of 6/27/22    Thank you for the courtesy of this consult, please call [phone] with any further questions.  ASSESSMENT AND PLAN:    86-year-old male with a history of left upper lobe lung mass, HTN, and HLD presenting with A-fib with AVR. Patient was initially scheduled for left upper lung biopsy with Interventional Radiology, however patient was in a-fib with RVR and hemodynamic instability and unable to obtain procedure.    Recommendations:  -medically optimize patient  - plan for biopsy Monday 6/27  -continue to hold aspirin - last dose 6/20 - must be held 5 days prior to procedure   -hold Eliquis 4 doses prior to procedure   - draw CBC, CMP, PT/INR   - repeat COVID test 48hr prior to procedure     Thank you for the courtesy of this consult, please call [phone] with any further questions.

## 2022-06-20 NOTE — ED PROVIDER NOTE - PHYSICAL EXAMINATION
CONSTITUTIONAL: Well-developed; well-nourished; in no acute distress. Laying comfortably in bed.  SKIN: Warm, dry  HEAD: Normocephalic; atraumatic  EYES: PERRL, EOMI, normal sclera and conjunctiva   ENT: No nasal discharge; airway clear.   NECK: Supple; non tender.   CARD:  Tachycardic, irregularly irregular. Normal S1, S2  RESP: Tachypneic ~30s; no retractions or nasal flaring. CTA b/l without wheezes, crackles, rhonchi  ABD: Normoactive BS. Soft, nontender, nondistended.  EXT: Normal ROM. B/l LE 2+ edema  LYMPH: No acute cervical adenopathy.  NEURO: Alert, oriented, grossly unremarkable  PSYCH: Cooperative, appropriate.

## 2022-06-20 NOTE — H&P ADULT - HISTORY OF PRESENT ILLNESS
Mr. Escobedo is an 87 y/o male with HTN, HLD, right carotid artery stenosis s/p CEA (6 years ago), vertigo, smoker (1-2 ppd for 25 years), new onset AFib diagnosed last Wednesday sent from IR to ED for AFib w/RVR. He's presenting to IR this morning for a scheduled lung biopsy for a lung mass found on CXR approximately 6-weeks ago. However, during pre-surgical testing last week Wednesday for the biopsy, his ECG showed AFib and he was sent to the ER. He was started on Eliquis and discharged home. He denied any history of heart disease or MI. He reports he stopped his Eliquis on Friday as instructed for the biopsy this morning. He denies any fever, chills, cough, chest pain, palpitations, shortness of breath, lightheadedness, syncope, headache, abdominal pain, nausea, vomiting, diaphoresis, no focal neurological deficits. No recent travel. No sick contacts.  Mr. Escobedo is an 85 y/o male with HTN, HLD, right carotid artery stenosis s/p CEA (6 years ago), vertigo, smoker (1-2 ppd for 25 years), new onset AFib diagnosed last Wednesday sent from IR to ED for AFib w/RVR. He's presenting to IR this morning for a scheduled lung biopsy for a lung mass found on CXR approximately 6-weeks ago. However, during pre-surgical testing last week Wednesday for the biopsy, his ECG showed AFib and he was sent to the ER. He was started on Eliquis and discharged home. He denied any history of heart disease or MI. He reports he stopped his Eliquis on Friday as instructed for the biopsy this morning. He denies any fever, chills, cough, chest pain, palpitations, shortness of breath, lightheadedness, syncope, headache, abdominal pain, nausea, vomiting, diaphoresis, no focal neurological deficits. No recent travel. No sick contacts.     In the ED; VS BP 99/70  RR 20 SpO2 95% on room air. EKG showed AFib w/, no ST elevations or depressions. CXR showed low lung volumes, no acute pulmonary disease. Labs unremarkable. Troponin 0.01. D-Dimer 318. Received Diltiazem 10mg IVP x 1 dose. POCUS TTE showed normal contractility, no effusion and grossly normal function.  Mr. Escobedo is an 85 y/o male with HTN, HLD, right carotid artery stenosis s/p CEA (6 years ago), vertigo, smoker (1-2 ppd for 25 years), lung mass, new onset AFib diagnosed last Wednesday sent from IR to ED for AFib w/RVR. He's presenting to IR this morning for a scheduled lung biopsy for a lung mass found on CXR approximately 6-weeks ago. However, during pre-surgical testing last week Wednesday for the biopsy, his ECG showed AFib and he was sent to the ER. He was started on Eliquis and discharged home. He denied any history of heart disease or MI. He reports he stopped his Eliquis on Friday as instructed for the biopsy this morning. He denies any fever, chills, cough, chest pain, palpitations, shortness of breath, lightheadedness, syncope, headache, abdominal pain, nausea, vomiting, diaphoresis, no focal neurological deficits. No recent travel. No sick contacts.     In the ED; VS BP 99/70  RR 20 SpO2 95% on room air. EKG showed AFib w/, no ST elevations or depressions. CXR showed low lung volumes, pulmonary congestion worse compared to previous. Labs unremarkable. Troponin 0.01. D-Dimer 318. Received Diltiazem 10mg IVP x 1 dose. POCUS TTE showed normal contractility, no effusion and grossly normal function.

## 2022-06-20 NOTE — H&P ADULT - NSHPSOCIALHISTORY_GEN_ALL_CORE
Lives with son. Retired; worked in transit. Former smoker (quit 40 years ago). Social drinker. No illicit drug use.

## 2022-06-20 NOTE — ED PROVIDER NOTE - NS ED ROS FT
Constitutional: No fever  Eyes:  No visual changes, eye pain or discharge.  ENMT:  No hearing changes, pain, no sore throat or runny nose, no difficulty swallowing  Cardiac:  No chest pain, SOB or edema. No chest pain with exertion.  Respiratory:  No cough or respiratory distress. No hemoptysis. No history of asthma or RAD.  GI:  No nausea, vomiting, diarrhea or abdominal pain.  :  No dysuria, frequency or burning.  MS:  No myalgia, muscle weakness, joint pain or back pain.  Neuro:  No headache or weakness.  No LOC.  Skin:  No skin rash.

## 2022-06-20 NOTE — ED ADULT TRIAGE NOTE - CHIEF COMPLAINT QUOTE
Pt transferred from IR for rapid afib and hypotension, pt was having outpatient procedure (lung biopsy)

## 2022-06-20 NOTE — PATIENT PROFILE ADULT - PACKS PER DAY
[FreeTextEntry1] : Chronic back pain\par Chronic migraines wo aura\par \par Essentially non focal\par \par Recommend to initiate topamax 25 mgs 1- 4 qd. Advised of AEs.\par Obtain UDT. \par Consider CBD/THC
1.5

## 2022-06-20 NOTE — PATIENT PROFILE ADULT - FALL HARM RISK - HARM RISK INTERVENTIONS
Assistance with ambulation/Assistance OOB with selected safe patient handling equipment/Communicate Risk of Fall with Harm to all staff/Discuss with provider need for PT consult/Monitor for mental status changes/Monitor gait and stability/Provide patient with walking aids - walker, cane, crutches/Reinforce activity limits and safety measures with patient and family/Reorient to person, place and time as needed/Review medications for side effects contributing to fall risk/Sit up slowly, dangle for a short time, stand at bedside before walking/Tailored Fall Risk Interventions/Use of alarms - bed, chair and/or voice tab/Visual Cue: Yellow wristband and red socks/Bed in lowest position, wheels locked, appropriate side rails in place/Call bell, personal items and telephone in reach/Instruct patient to call for assistance before getting out of bed or chair/Non-slip footwear when patient is out of bed/[place] to call system/Physically safe environment - no spills, clutter or unnecessary equipment/Purposeful Proactive Rounding/Room/bathroom lighting operational, light cord in reach

## 2022-06-20 NOTE — H&P ADULT - ASSESSMENT
ASSESSMENT:    Mr. Escobedo is an 85 y/o male with HTN, HLD, new onset AFib that's admitted for AFib w/RVR      PLAN:     #AFib w/RVR  -Continuous telemetry monitoring  - [id number] score 4  - Rate control with Cardizem drip  -Anticoagulation with Eliquis 5mg PO BID  - 2D Echo  -Thyroid panel (Total T3, T4, TSH)  -Troponin 0.01 x 1; continue to trend troponin to peak    #Vertigo  -Meclizine 25mg PO PRN    #Carotid artery stenosis s/p CEA  -Aspirin 81mg PO daily    #HTN  -Lisinopril 10mg PO daily  -Monitor BP, HR    #HLD  -Atorvastatin 80mg PO HS  -Lipid profile     #R/O DM  -HbA1C  -FS AC & HS    #Pain Control  -Tylenol 650mg PO PRN    #DVT ppx  -On anticoagulation    #Code Status  ?   ASSESSMENT:    Mr. Escobedo is an 87 y/o male with HTN, HLD, new onset AFib that's admitted for AFib w/RVR      PLAN:     #AFib w/RVR  -Continuous telemetry monitoring  -Rate control with Cardizem drip  - [id number] score 4  -Anticoagulation with Eliquis 5mg PO BID  - 2D Echo  -Check thyroid panel (Total T3, T4, TSH)  -Check Mg & K keep Mg > 2.0 and K  > 4.0   -Troponin 0.01 x 1; continue to trend troponin to peak    #Vertigo  -Meclizine 25mg PO PRN    #Carotid artery stenosis s/p CEA  -Aspirin 81mg PO daily    #HTN  -Lisinopril 10mg PO daily  -Monitor BP, HR    #HLD  -Atorvastatin 80mg PO HS  -Lipid profile     #R/O DM  -HbA1C  -FS AC & HS    #Pain Control  -Tylenol 650mg PO PRN    #DVT ppx  -On anticoagulation    #Code Status  ?   ASSESSMENT:    Mr. Escobedo is an 87 y/o male with HTN, HLD, new onset AFib that's admitted for AFib w/RVR      PLAN:     #AFib w/RVR  -Continuous telemetry monitoring  -Rate control with Cardizem drip  -[id number] score 4  -Hold oral anticoagulation pending lung biopsy  -Start Lovenox 70 mg SC BID  - 2D Echo  -Check thyroid panel (Total T3, T4, TSH)  -Check Mg & K keep Mg > 2.0 and K  > 4.0   -Troponin 0.01 x 1; continue to trend troponin to peak    #Vertigo  -Meclizine 25mg PO PRN    #Carotid artery stenosis s/p CEA  -Aspirin 81mg PO daily    #HTN  -Lisinopril 10mg PO daily  -Monitor BP, HR    #HLD  -Atorvastatin 80mg PO HS  -Lipid profile     #R/O DM  -HbA1C  -FS AC & HS    #Pain Control  -Tylenol 650mg PO PRN    #DVT ppx  -On anticoagulation    #Code Status  ?   ASSESSMENT:    Mr. Escobedo is an 87 y/o male with lung mass, HTN, HLD, new onset AFib that's admitted for AFib w/RVR      PLAN:     #AFib w/RVR  -Continuous telemetry monitoring  -Rate control with Cardizem drip  -[id number] score 4  -Hold oral anticoagulation pending lung biopsy by IR  - 2D Echo  -Check thyroid panel (Total T3, free T4, TSH)  -Check Mg & K keep Mg > 2.0 and K  > 4.0   -Troponin 0.01 x 1; continue to trend troponin to peak    #Lung Mass  -IR consult  -FNA    #Fluid overload  -Lasix 40mg IVP x 1 dose  -Lasix 40mg PO daily    #Vertigo  -Meclizine 25mg PO PRN    #Carotid artery stenosis s/p CEA  -Aspirin 81mg PO daily    #HTN  -On Ramipril 2.5mg PO daily  -Therapeutic exchange with Lisinopril 10mg PO daily; HOLD for soft BP  -Monitor BP, HR per routine    #HLD  -Atorvastatin 80mg PO HS  -Check lipid profile     #R/O DM  -HbA1C  -FS AC & HS    #Pain Control  -Tylenol 650mg PO PRN    #DVT ppx  -On anticoagulation    #Code Status  -Full Code ASSESSMENT:    Mr. Escobedo is an 87 y/o male with lung mass, HTN, HLD, new onset AFib that's admitted for AFib w/RVR      PLAN:     #AFib w/RVR  -Continuous telemetry monitoring  -Rate control with Cardizem drip  -[id number] score 4  -Hold oral anticoagulation pending lung biopsy by IR  - 2D Echo  -Check thyroid panel (Total T3, free T4, TSH)  -Check Mg & K keep Mg > 2.0 and K  > 4.0   -Troponin 0.01 x 1; continue to trend troponin to peak    #Lung Mass  -IR consult  -FNA    #Fluid overload  -Lasix 40mg IVP x 1 dose  -Lasix 40mg PO daily    #Vertigo  -Meclizine 25mg PO PRN    #Carotid artery stenosis s/p CEA  -Hold Aspirin 81mg PO daily until after lung biopsy/FNA    #HTN  -On Ramipril 2.5mg PO daily  -Therapeutic exchange with Lisinopril 10mg PO daily; HOLD for soft BP  -Monitor BP, HR per routine    #HLD  -Atorvastatin 80mg PO HS  -Check lipid profile     #R/O DM  -HbA1C  -FS AC & HS    #Pain Control  -Tylenol 650mg PO PRN    #DVT ppx  -On anticoagulation    #Code Status  -Full Code

## 2022-06-20 NOTE — CONSULT NOTE ADULT - SUBJECTIVE AND OBJECTIVE BOX
INTERVENTIONAL RADIOLOGY CONSULT:     Procedure Requested:     HPI:  Mr. Escobedo is an 85 y/o male with HTN, HLD, right carotid artery stenosis s/p CEA (6 years ago), vertigo, smoker (1-2 ppd for 25 years), lung mass, new onset AFib diagnosed last Wednesday sent from IR to ED for AFib w/RVR. He's presenting to IR this morning for a scheduled lung biopsy for a lung mass found on CXR approximately 6-weeks ago. However, during pre-surgical testing last week Wednesday for the biopsy, his ECG showed AFib and he was sent to the ER. He was started on Eliquis and discharged home. He denied any history of heart disease or MI. He reports he stopped his Eliquis on Friday as instructed for the biopsy this morning. He denies any fever, chills, cough, chest pain, palpitations, shortness of breath, lightheadedness, syncope, headache, abdominal pain, nausea, vomiting, diaphoresis, no focal neurological deficits. No recent travel. No sick contacts.     In the ED; VS BP 99/70  RR 20 SpO2 95% on room air. EKG showed AFib w/, no ST elevations or depressions. CXR showed low lung volumes, pulmonary congestion worse compared to previous. Labs unremarkable. Troponin 0.01. D-Dimer 318. Received Diltiazem 10mg IVP x 1 dose. POCUS TTE showed normal contractility, no effusion and grossly normal function.  (20 Jun 2022 11:40)      PAST MEDICAL & SURGICAL HISTORY:  Vertigo      HTN (hypertension)      HLD (hyperlipidemia)      History of CEA (carotid endarterectomy)      S/P cataract surgery      H/O umbilical hernia repair          MEDICATIONS  (STANDING):  atorvastatin 80 milliGRAM(s) Oral at bedtime  diltiazem Infusion 5 mG/Hr (5 mL/Hr) IV Continuous <Continuous>    MEDICATIONS  (PRN):  acetaminophen     Tablet .. 650 milliGRAM(s) Oral every 6 hours PRN Mild Pain (1 - 3)  meclizine 25 milliGRAM(s) Oral daily PRN Dizziness  melatonin 3 milliGRAM(s) Oral at bedtime PRN Insomnia      Allergies    No Known Allergies    Intolerances          FAMILY HISTORY:  FH: HTN (hypertension) (Mother)    FH: lung cancer (Father)    FHx: myocardial infarction (Child)        Physical Exam:   Vital Signs Last 24 Hrs  T(C): 36.1 (20 Jun 2022 16:17), Max: 36.6 (20 Jun 2022 08:41)  T(F): 96.9 (20 Jun 2022 16:17), Max: 97.8 (20 Jun 2022 08:41)  HR: 101 (20 Jun 2022 16:30) (70 - 140)  BP: 105/61 (20 Jun 2022 16:30) (71/50 - 153/63)  BP(mean): --  RR: 17 (20 Jun 2022 16:30) (17 - 20)  SpO2: 96% (20 Jun 2022 16:30) (95% - 99%)      Labs:                         12.0   7.15  )-----------( 275      ( 20 Jun 2022 08:43 )             35.4     06-20    133<L>  |  98  |  13  ----------------------------<  102<H>  4.5   |  28  |  0.8    Ca    9.3      20 Jun 2022 08:43    TPro  6.4  /  Alb  3.6  /  TBili  0.5  /  DBili  x   /  AST  20  /  ALT  17  /  AlkPhos  102  06-20        Pertinent labs:                      12.0   7.15  )-----------( 275      ( 20 Jun 2022 08:43 )             35.4       06-20    133<L>  |  98  |  13  ----------------------------<  102<H>  4.5   |  28  |  0.8    Ca    9.3      20 Jun 2022 08:43    TPro  6.4  /  Alb  3.6  /  TBili  0.5  /  DBili  x   /  AST  20  /  ALT  17  /  AlkPhos  102  06-20          Radiology & Additional Studies:     Radiology imaging reviewed.        INTERVENTIONAL RADIOLOGY CONSULT:     Procedure Requested:     HPI:  Mr. Escobedo is an 85 y/o male with HTN, HLD, right carotid artery stenosis s/p CEA (6 years ago), vertigo, smoker (1-2 ppd for 25 years), lung mass, new onset AFib diagnosed last Wednesday sent from IR to ED for AFib w/RVR. He's presenting to IR this morning for a scheduled lung biopsy for a lung mass found on CXR approximately 6-weeks ago. However, during pre-surgical testing last week Wednesday for the biopsy, his ECG showed AFib and he was sent to the ER. He was started on Eliquis and discharged home. He denied any history of heart disease or MI. He reports he stopped his Eliquis on Friday as instructed for the biopsy this morning. He denies any fever, chills, cough, chest pain, palpitations, shortness of breath, lightheadedness, syncope, headache, abdominal pain, nausea, vomiting, diaphoresis, no focal neurological deficits. No recent travel. No sick contacts.     In the ED; VS BP 99/70  RR 20 SpO2 95% on room air. EKG showed AFib w/, no ST elevations or depressions. CXR showed low lung volumes, pulmonary congestion worse compared to previous. Labs unremarkable. Troponin 0.01. D-Dimer 318. Received Diltiazem 10mg IVP x 1 dose. POCUS TTE showed normal contractility, no effusion and grossly normal function.  (20 Jun 2022 11:40)      PAST MEDICAL & SURGICAL HISTORY:  Vertigo      HTN (hypertension)      HLD (hyperlipidemia)      History of CEA (carotid endarterectomy)      S/P cataract surgery      H/O umbilical hernia repair          MEDICATIONS  (STANDING):  atorvastatin 80 milliGRAM(s) Oral at bedtime  diltiazem Infusion 5 mG/Hr (5 mL/Hr) IV Continuous <Continuous>    MEDICATIONS  (PRN):  acetaminophen     Tablet .. 650 milliGRAM(s) Oral every 6 hours PRN Mild Pain (1 - 3)  meclizine 25 milliGRAM(s) Oral daily PRN Dizziness  melatonin 3 milliGRAM(s) Oral at bedtime PRN Insomnia      Allergies    No Known Allergies    Intolerances          FAMILY HISTORY:  FH: HTN (hypertension) (Mother)    FH: lung cancer (Father)    FHx: myocardial infarction (Child)        Physical Exam:   Vital Signs Last 24 Hrs  T(C): 36.1 (20 Jun 2022 16:17), Max: 36.6 (20 Jun 2022 08:41)  T(F): 96.9 (20 Jun 2022 16:17), Max: 97.8 (20 Jun 2022 08:41)  HR: 101 (20 Jun 2022 16:30) (70 - 140)  BP: 105/61 (20 Jun 2022 16:30) (71/50 - 153/63)  BP(mean): --  RR: 17 (20 Jun 2022 16:30) (17 - 20)  SpO2: 96% (20 Jun 2022 16:30) (95% - 99%)      Labs:                         12.0   7.15  )-----------( 275      ( 20 Jun 2022 08:43 )             35.4     06-20    133<L>  |  98  |  13  ----------------------------<  102<H>  4.5   |  28  |  0.8    Ca    9.3      20 Jun 2022 08:43    TPro  6.4  /  Alb  3.6  /  TBili  0.5  /  DBili  x   /  AST  20  /  ALT  17  /  AlkPhos  102  06-20                  Radiology & Additional Studies:     Radiology imaging reviewed.

## 2022-06-20 NOTE — ED ADULT NURSE REASSESSMENT NOTE - NS ED NURSE REASSESS COMMENT FT1
transported to 3 C , report given to 3 C RN , pt alert , awake , denies any pain , IVL intact , no SOB , all belongings sent to unit with pt

## 2022-06-21 NOTE — DISCHARGE NOTE PROVIDER - CARE PROVIDER_API CALL
Kinjal Floyd (MD; MPH)  Internal Medicine  [address] [place] [address], [address]  [place] [address]  Phone: [phone]  Fax: [phone]  Scheduled Appointment: 06/21/2022 11:00 AM   Kinjal Floyd; MPH)  Internal Medicine  [address] [facility], [address]  [place] [address]  Phone: [phone]  Fax: [phone]  Scheduled Appointment: 06/21/2022 11:00 AM    Perfecto Darnell (DO)  Internal Medicine  [address] [place] [address]  Phone: [phone]  Fax: [phone]  Follow Up Time: 2 weeks

## 2022-06-21 NOTE — PROGRESS NOTE ADULT - SUBJECTIVE AND OBJECTIVE BOX
SUBJECTIVE:    Patient is a 86y old Male who presents with a chief complaint of AFib w/ RVR (21 Jun 2022 13:32)    Currently admitted to medicine with the primary diagnosis of Atrial fibrillation       Today is hospital day 1d. This morning he is resting comfortably in bed and reports no new issues or overnight events.     PAST MEDICAL & SURGICAL HISTORY  Vertigo    HTN (hypertension)    HLD (hyperlipidemia)    History of CEA (carotid endarterectomy)    S/P cataract surgery    H/O umbilical hernia repair      SOCIAL HISTORY:  Negative for smoking/alcohol/drug use.     ALLERGIES:  No Known Allergies    MEDICATIONS:  STANDING MEDICATIONS  apixaban 5 milliGRAM(s) Oral every 12 hours  atorvastatin 80 milliGRAM(s) Oral at bedtime  furosemide    Tablet 40 milliGRAM(s) Oral daily    PRN MEDICATIONS  acetaminophen     Tablet .. 650 milliGRAM(s) Oral every 6 hours PRN  meclizine 25 milliGRAM(s) Oral daily PRN  melatonin 3 milliGRAM(s) Oral at bedtime PRN    VITALS:   T(F): 96.4  HR: 83  BP: 114/60  RR: 18  SpO2: 96%    LABS:                        13.0   7.12  )-----------( 309      ( 21 Jun 2022 06:00 )             38.3     06-21    138  |  100  |  14  ----------------------------<  94  4.8   |  26  |  0.8    Ca    8.8      21 Jun 2022 06:00  Mg     2.0     06-21    TPro  6.4  /  Alb  3.6  /  TBili  0.5  /  DBili  x   /  AST  20  /  ALT  17  /  AlkPhos  102  06-20              CARDIAC MARKERS ( 20 Jun 2022 08:43 )  x     / 0.01 ng/mL / x     / x     / x          RADIOLOGY:    PHYSICAL EXAM:  GEN: No acute distress  LUNGS: Clear to auscultation bilaterally   HEART: Regular  ABD: Soft, non-tender, non-distended.  EXT: NC/NC/NE/2+PP/SERNA/Skin Intact.   NEURO: AAOX3    Intravenous access:   NG tube:   Zaman Catheter:        SUBJECTIVE:    Patient is a 86y old Male who presents with a chief complaint of AFib w/ RVR (21 Jun 2022 13:32)    Currently admitted to medicine with the primary diagnosis of Atrial fibrillation       Today is hospital day 1d. This morning he is resting comfortably in bed and reports no new issues or overnight events.     PAST MEDICAL & SURGICAL HISTORY  Vertigo    HTN (hypertension)    HLD (hyperlipidemia)    History of CEA (carotid endarterectomy)    S/P cataract surgery    H/O umbilical hernia repair      SOCIAL HISTORY:  Negative for smoking/alcohol/drug use.     ALLERGIES:  No Known Allergies    MEDICATIONS:  STANDING MEDICATIONS  apixaban 5 milliGRAM(s) Oral every 12 hours  atorvastatin 80 milliGRAM(s) Oral at bedtime  furosemide    Tablet 40 milliGRAM(s) Oral daily    PRN MEDICATIONS  acetaminophen     Tablet .. 650 milliGRAM(s) Oral every 6 hours PRN  meclizine 25 milliGRAM(s) Oral daily PRN  melatonin 3 milliGRAM(s) Oral at bedtime PRN    VITALS:   T(F): 96.4  HR: 83  BP: 114/60  RR: 18  SpO2: 96%    LABS:                        13.0   7.12  )-----------( 309      ( 21 Jun 2022 06:00 )             38.3     06-21    138  |  100  |  14  ----------------------------<  94  4.8   |  26  |  0.8    Ca    8.8      21 Jun 2022 06:00  Mg     2.0     06-21    TPro  6.4  /  Alb  3.6  /  TBili  0.5  /  DBili  x   /  AST  20  /  ALT  17  /  AlkPhos  102  06-20              CARDIAC MARKERS ( 20 Jun 2022 08:43 )  x     / 0.01 ng/mL / x     / x     / x          RADIOLOGY:      ACC: [id number] EXAM:  XR CHEST 1 VIEW                          PROCEDURE DATE:  06/20/2022          INTERPRETATION:  Clinical History / Reason for exam: Chest pain    Comparison : Chest radiograph 6/14/2022.    Technique/Positioning: Frontal chest radiograph.    Findings:    Support devices: None.    Cardiac/mediastinum/hilum: Without significant change    Lung parenchyma/Pleura: Low lung volumes. Left lung opacities   redemonstrated. No pneumothorax.    Skeleton/soft tissues: Without significant change    Impression:    Low lung volumes. Left lung opacities redemonstrated. See prior PET/CT   for detailed evaluation.    --- End of Report ---            JAQUELIN ADAMS MD; Attending Radiologist  This document has been electronically signed.Jun 20 2022  1:15PM      PHYSICAL EXAM:  GEN: No acute distress  LUNGS: Clear to auscultation bilaterally   HEART: Regular  ABD: Soft, non-tender, non-distended.  EXT: NC/NC/NE/2+PP/SERNA/Skin Intact.   NEURO: AAOX3

## 2022-06-21 NOTE — DISCHARGE NOTE NURSING/CASE MANAGEMENT/SOCIAL WORK - PATIENT PORTAL LINK FT
You can access the FollowMyHealth Patient Portal offered by [facility] by registering at the following website: [email or web address]. By joining [employer]’s FollowMyHealth portal, you will also be able to view your health information using other applications (apps) compatible with our system.

## 2022-06-21 NOTE — DISCHARGE NOTE PROVIDER - PROVIDER TOKENS
PROVIDER:[TOKEN:[[id number]],SCHEDULEDAPPT:[06/21/2022],SCHEDULEDAPPTTIME:[11:00 AM]] PROVIDER:[TOKEN:[[id number]],SCHEDULEDAPPT:[06/21/2022],SCHEDULEDAPPTTIME:[11:00 AM]],PROVIDER:[TOKEN:[[id number]],FOLLOWUP:[2 weeks]]

## 2022-06-21 NOTE — DISCHARGE NOTE NURSING/CASE MANAGEMENT/SOCIAL WORK - NSDCPEFALRISK_GEN_ALL_CORE
Quality 226: Preventive Care And Screening: Tobacco Use: Screening And Cessation Intervention: Patient screened for tobacco use and is an ex/non-smoker
Detail Level: Detailed
For information on Fall & Injury Prevention, visit: [email or web address] OR  [email or web address] OR  https://www.cdc.gov/steadi/patient.html
Quality 130: Documentation Of Current Medications In The Medical Record: Current Medications Documented
Quality 431: Preventive Care And Screening: Unhealthy Alcohol Use - Screening: Patient screened for unhealthy alcohol use using a single question and scores less than 2 times per year

## 2022-06-21 NOTE — DISCHARGE NOTE PROVIDER - NSCORESITESY/N_GEN_A_CORE_RD
No Debridement Text (Will Only Render In Visit Note If You Select Debridement Option Under Who Performed The Pdt Field): Prior to application of the photodynamic medication the hyperkeratotic lesions were curetted to make them more amenable to therapy.

## 2022-06-21 NOTE — DISCHARGE NOTE PROVIDER - HOSPITAL COURSE
Patient is a 87 y/o male with PMHx of HTN, HLD, PAD with right carotid artery stenosis s/p CEA (6 years ago), vertigo, previous tobacco user, recently Dx lung mass in May of 2022 which has thus far been negative for malignancy, Paroxsymal atrial fibrillation initially Dx 6/14/22 who spontaneously converted to SR and was started on Eliquis and DC home.    On 6/20/22 He presented to [facility] for elective Lung FNA in IR. He however was found to be in atrial fibrillation with RVR in the 120s-130s. He was sent to the ED and was admitted for rate control. Case was discussed with IR and patient was rescheduled for 6/27/22. BNP was elevated at 2981 with mild overload so he was given one dose of IV Lasix. Patient was started on IV diltiazem and transitioned to oral diltiazem which was titrated to 180mg PO Daily for adequate rate control. TTE was performed 6/21/22 which showed normal LV function. Eliquis was resumed at 5mg PO Daily. He will continue this through Friday 6/24/22. He will remain off ASA until Biopsy. He is being dischagred home in stable condition. Patient is a 87 y/o male with PMHx of HTN, HLD, PAD with right carotid artery stenosis s/p CEA (6 years ago), vertigo, previous tobacco user, recently Dx lung mass in May of 2022 which has thus far been negative for malignancy, Paroxsymal atrial fibrillation initially Dx 6/14/22 who spontaneously converted to SR and was started on Eliquis and DC home.    On 6/20/22 He presented to [facility] for elective Lung FNA in IR. He however was found to be in atrial fibrillation with RVR in the 120s-130s. He was sent to the ED and was admitted for rate control. Case was discussed with IR and patient was rescheduled for 6/27/22. BNP was elevated at 2981 with mild overload so he was given one dose of IV Lasix. Patient was started on IV diltiazem and transitioned to oral diltiazem which was titrated to 180mg PO Daily for adequate rate control. He developed hypotension with SBP in the 80s therefore Diltiazem was changed to Toprol XL 50mg PO Daily TTE was performed 6/21/22 which showed normal LV function. Eliquis was resumed at 5mg PO Daily. He will continue this through Friday 6/24/22. He will remain off ASA until Biopsy. He is being dischagred home in stable condition. Patient is a 87 y/o male with PMHx of HTN, HLD, PAD with right carotid artery stenosis s/p CEA (6 years ago), vertigo, previous tobacco user, recently Dx lung mass in May of 2022 which has thus far been negative for malignancy, Paroxsymal atrial fibrillation initially Dx 6/14/22 who spontaneously converted to SR and was started on Eliquis and DC home.    On 6/20/22 He presented to [facility] for elective Lung FNA in IR. He however was found to be in atrial fibrillation with RVR in the 120s-130s. He was sent to the ED and was admitted for rate control. Case was discussed with IR and patient was rescheduled for 6/27/22. BNP was elevated at 2981 with mild overload so he was given one dose of IV Lasix. TTE was performed 6/21/22 which showed normal LV function. Patient was started on IV diltiazem and transitioned to oral diltiazem which was titrated to 180mg PO Daily for adequate rate control. He developed hypotension with SBP in the 70s therefore Diltiazem was changed to Toprol. Patient's HR was not adequately controlled on Toprol 100 mg daily, and so he was also started on digoxin.     Eliquis was resumed at 5mg PO Daily. He will continue this through Friday 6/24/22. He will remain off ASA until Biopsy. He is being dischagred home in stable condition.

## 2022-06-21 NOTE — DISCHARGE NOTE PROVIDER - NSDCMRMEDTOKEN_GEN_ALL_CORE_FT
acetaminophen 325 mg oral tablet: 2 tab(s) orally every 6 hours, As needed, Temp greater or equal to 38C (100.4F), Mild Pain (1 - 3)  atorvastatin 80 mg oral tablet: 1 tab(s) orally once a day  Eliquis Starter Pack for Treatment of DVT and PE 5 mg oral tablet: 1 tab(s) orally once a day   meclizine 25 mg oral tablet, chewable: 1 tab(s) orally 3 times a day, As Needed   acetaminophen 325 mg oral tablet: 2 tab(s) orally every 6 hours, As needed, Temp greater or equal to 38C (100.4F), Mild Pain (1 - 3)  atorvastatin 80 mg oral tablet: 1 tab(s) orally once a day  Eliquis Starter Pack for Treatment of DVT and PE 5 mg oral tablet: 1 tab(s) orally once a day   meclizine 25 mg oral tablet, chewable: 1 tab(s) orally 3 times a day, As Needed  metoprolol succinate 50 mg oral tablet, extended release: 1 tab(s) orally once   acetaminophen 325 mg oral tablet: 2 tab(s) orally every 6 hours, As needed, Temp greater or equal to 38C (100.4F), Mild Pain (1 - 3)  atorvastatin 80 mg oral tablet: 1 tab(s) orally once a day  digoxin 125 mcg (0.125 mg) oral tablet: 1 tab(s) orally once a day   Eliquis Starter Pack for Treatment of DVT and PE 5 mg oral tablet: 1 tab(s) orally once a day please hold eliquis starting friday night  meclizine 25 mg oral tablet, chewable: 1 tab(s) orally 3 times a day, As Needed  metoprolol succinate 100 mg oral capsule, extended release: 1 cap(s) orally once a day

## 2022-06-21 NOTE — PROGRESS NOTE ADULT - ASSESSMENT
Mr. Escobedo is an 87 y/o male with lung mass, HTN, HLD, new onset AFib that's admitted for AFib w/RVR initially rate controlled w/ diltiazem drip then switched to Cardizem 180mg.      #AFib w/RVR  -Continuous telemetry monitoring  -Stop Cardizem for Hypotension.  -c/w apixaban 5mg bid   -Check Mg & K keep Mg > 2.0 and K  > 4.0     #Lung Mass biopsy/FNA  - plan for biopsy Monday 6/27  - continue to hold aspirin - last dose 6/20  - Hold Eliquis 4 doses prior to procedure   - repeat COVID test 48hr prior to procedure       #Fluid overload  --c/w Lasix 40mg PO daily    #Vertigo  -Meclizine 25mg PO PRN    #Carotid artery stenosis s/p CEA  -Hold Aspirin 81mg PO daily until after lung biopsy/FNA    #HTN  - hold Ramipril for low blood pressure    #HLD  -C/w Atorvastatin 80mg PO HS (LDL 53 with current therapy)    #Pain Control  -Tylenol 650mg PO PRN    #DVT ppx  -On anticoagulation    #Code Status  -Full Code   Mr. Escobedo is an 85 y/o male with lung mass, HTN, HLD, new onset AFib that's admitted for AFib w/RVR initially rate controlled w/ diltiazem drip then switched to Cardizem 180mg.      #AFib w/RVR  -Continuous telemetry monitoring  -Stop Cardizem for Hypotension.  -Start rate control with metoprolol succinate 50 mg PO QD starting tomorrow  -c/w apixaban 5mg bid   -Check Mg & K keep Mg > 2.0 and K  > 4.0     #Lung Mass biopsy/FNA  - plan for biopsy Monday 6/27  - continue to hold aspirin - last dose 6/20  - Hold Eliquis 4 doses prior to procedure   - repeat COVID test 48hr prior to procedure     #Fluid overload  -c/w Lasix 40mg PO daily    #Vertigo  -Meclizine 25mg PO PRN    #Carotid artery stenosis s/p CEA  -Hold Aspirin 81mg PO daily until after lung biopsy/FNA    #HTN  - hold Ramipril for low blood pressure    #HLD  -C/w Atorvastatin 80mg PO HS (LDL 53 with current therapy)    #Pain Control  -Tylenol 650mg PO PRN    #DVT ppx -Apixaban  #Code Status -Full Code

## 2022-06-21 NOTE — DISCHARGE NOTE PROVIDER - NSDCFUSCHEDAPPT_GEN_ALL_CORE_FT
Kinjal Floyd  [facility] Physician [facility]  CARDIOLOGY [address]  Scheduled Appointment: 07/21/2022    Ilir Fletcher  [facility]  PULMMED [address]  Scheduled Appointment: 07/21/2022    Gennaro Arboleda  [facility] Physician [facility]  VASCULAR [address]  Scheduled Appointment: 09/13/2022

## 2022-06-21 NOTE — DISCHARGE NOTE PROVIDER - NSDCCPCAREPLAN_GEN_ALL_CORE_FT
PRINCIPAL DISCHARGE DIAGNOSIS  Diagnosis: Atrial fibrillation  Assessment and Plan of Treatment:        PRINCIPAL DISCHARGE DIAGNOSIS  Diagnosis: Atrial fibrillation  Assessment and Plan of Treatment: You came with atrial fibrillation with rapid heart rate. We initially controlled your heart rate with diltiazem IVdrip. However, when you were switched to cardizem, you had hypotension. Therefore, cardizem was stopped and metoprolol was started instead. You will be discharged on a stable dose of metoprolol.  Please remember to take your medications as prescribed and to come to your scheduled appointments.       PRINCIPAL DISCHARGE DIAGNOSIS  Diagnosis: Atrial fibrillation  Assessment and Plan of Treatment: You came with atrial fibrillation with rapid heart rate. We initially controlled your heart rate with diltiazem IVdrip. However, when you were switched to cardizem, you had hypotension. Therefore, cardizem was stopped and metoprolol was started instead. You will be discharged on a stable dose of metoprolol and digoxin for added heart rate control.  Please remember to take your medications as prescribed and to come to your scheduled appointments.  Please remember to hold eliquis starting friday night for your lung biopsy on monday 6/27 after that you can resume taking aspirin if cleared by the interventional rafiologist performing your FNA biopsy.

## 2022-06-21 NOTE — DISCHARGE NOTE PROVIDER - ATTENDING DISCHARGE PHYSICAL EXAMINATION:
HR in the 100-120s on Toprol 100 mg daily. Given digoxin 250 mcg IV today, and will discharge on 125 mcg PO daily. By Monday, I expect his HR to be rate controlled. If not, please see Chart Note dated 6/23/22 which provides a plan of care for his IR-guided biopsy of his lung mass.

## 2022-06-22 NOTE — CONSULT NOTE ADULT - ASSESSMENT
IMPRESSION:    MARILEE Mass SP Bronch/EBUS 5/4/22 (cytopathology negative for malignant cells)  New Onset AFIB with RVR  HO CAD    PLAN:    Planned for CT-guided FNA on 06/27, will follow up on biopsy results   Patient comfortable on room air   Aspiration precautions  HOB 45  Incentive spirometry  on Eliquis   OP pulmonary follow up      IMPRESSION:    MARILEE Mass Awaiting CT guided FNA   New Onset AFIB with RVR, rate better controlled   HO CAD    PLAN:    Planned for CT-guided FNA on 06/27, will follow up on biopsy results   Patient comfortable on room air   Aspiration precautions  Rate control per cardiology   HOB 45  Incentive spirometry  on Eliquis   OP pulmonary follow up

## 2022-06-22 NOTE — PROGRESS NOTE ADULT - SUBJECTIVE AND OBJECTIVE BOX
SUBJECTIVE:    Patient is a 86y old Male who presents with a chief complaint of AFib w/ RVR (21 Jun 2022 15:49)    Currently admitted to medicine with the primary diagnosis of Atrial fibrillation       Today is hospital day 2d. This morning he is resting comfortably in bed and reports no new issues or overnight events.     PAST MEDICAL & SURGICAL HISTORY  Vertigo    HTN (hypertension)    HLD (hyperlipidemia)    History of CEA (carotid endarterectomy)    S/P cataract surgery    H/O umbilical hernia repair      SOCIAL HISTORY:  Negative for smoking/alcohol/drug use.     ALLERGIES:  No Known Allergies    MEDICATIONS:  STANDING MEDICATIONS  apixaban 5 milliGRAM(s) Oral every 12 hours  atorvastatin 80 milliGRAM(s) Oral at bedtime    PRN MEDICATIONS  acetaminophen     Tablet .. 650 milliGRAM(s) Oral every 6 hours PRN  meclizine 25 milliGRAM(s) Oral daily PRN  melatonin 3 milliGRAM(s) Oral at bedtime PRN    VITALS:   T(F): 96.6  HR: 111  BP: 87/42  RR: 19  SpO2: 97%    LABS:                        12.1   8.14  )-----------( 298      ( 22 Jun 2022 06:34 )             35.9     06-22    137  |  99  |  26<H>  ----------------------------<  105<H>  3.9   |  24  |  0.9    Ca    8.8      22 Jun 2022 06:34  Mg     2.0     06-22    Echocardio  1. Normal global left ventricular systolic function.   2. LV Ejection Fraction by Warren's Method with a biplane EF of 60 %.   3. Mildly increased LV wall thickness.   4. Normal left ventricular internal cavity size.   5. Normal left atrial size.   6. Normal right atrial size.   7. Mild mitral valve regurgitation.   8. Mild tricuspid regurgitation.   9. Mild aortic valve stenosis.  10. Increased relative wall thickness with normal mass index consistent   with left ventricular concentric remodeling.  11. LAvolume Index is 23.2 ml/m² ml/m2.      PHYSICAL EXAM:  GEN: No acute distress  LUNGS: decreased breath sounds   HEART: irregular s1s2  ABD: Soft, non-tender, non-distended.  EXT: NC/NC/NE/2+PP/SERNA/Skin Intact.   NEURO: AAOX3    Intravenous access:   NG tube:   Zaman Catheter:

## 2022-06-22 NOTE — CONSULT NOTE ADULT - ATTENDING COMMENTS
IMPRESSION:    MARILEE Mass Awaiting CT guided FNA   New Onset AFIB with RVR, rate better controlled   HO CAD    PLan as outlined above

## 2022-06-22 NOTE — PROGRESS NOTE ADULT - ASSESSMENT
Mr. Escobedo is an 87 y/o male with lung mass, HTN, HLD, new onset AFib that's admitted for AFib w/RVR initially rate controlled w/ diltiazem which  was stopped after he developed hypotension.      #AFib w/RVR  -Continuous telemetry monitoring  -metoprolol succinate 50 mg PO once and repeat orthostatic BP  -c/w apixaban 5mg bid   -Check Mg & K keep Mg > 2.0 and K  > 4.0     #Lung Mass biopsy/FNA  - plan for biopsy Monday 6/27  - continue to hold aspirin - last dose 6/20  - Hold Eliquis 4 doses prior to procedure   - repeat COVID test 48hr prior to procedure     #Fluid overload  -Lasix stopped due hypotension    #Vertigo  -Meclizine 25mg PO PRN    #Carotid artery stenosis s/p CEA  -Hold Aspirin 81mg PO daily until after lung biopsy/FNA    #HTN  - hold Ramipril for hypotension    #HLD  -C/w Atorvastatin 80mg PO HS (LDL 53 with current therapy)    #Pain Control  -Tylenol 650mg PO PRN    #DVT ppx -Apixaban  #Code Status -Full Code

## 2022-06-22 NOTE — CONSULT NOTE ADULT - SUBJECTIVE AND OBJECTIVE BOX
Patient is a 86y old  Male who presents with a chief complaint of AFIB with RVR (22 Jun 2022 10:02)      HPI: 87 yo M with PMHx of HTN, HLD, Right Carotid Artery Stenosis s/p CEA (6 years ago), vertigo, smoker (1-2 ppd for 25 years), lung mass, new onset AFIB diagnosed last Wednesday presented to IR for CT-guided FNA when review of ECG was revealing of AFIB with RVR to 135. In the ED; VS BP 99/70  RR 20 SpO2 95% on room air.     PAST MEDICAL & SURGICAL HISTORY:  Vertigo  HTN (hypertension)  HLD (hyperlipidemia)  History of CEA (carotid endarterectomy)  S/P cataract surgery  H/O umbilical hernia repair        SOCIAL HX:   Smoking   exsmoker 1ppd for 20+ years, quit 40 years ago                   ETOH   social                         Other  Retired transit  of 23yrs      FAMILY HISTORY:  FH: HTN (hypertension) (Mother)    FH: lung cancer (Father)    FHx: myocardial infarction (Child)    .  No cardiovascular or pulmonary family history     REVIEW OF SYSTEMS:    All ROS are negative except per HPI       Allergies    No Known Allergies    Intolerances        PHYSICAL EXAM  Vital Signs Last 24 Hrs  T(C): 35.9 (22 Jun 2022 04:13), Max: 36.1 (21 Jun 2022 20:20)  T(F): 96.6 (22 Jun 2022 04:13), Max: 96.9 (21 Jun 2022 20:20)  HR: 111 (22 Jun 2022 08:25) (83 - 111)  BP: 87/42 (22 Jun 2022 08:25) (87/42 - 113/58)  RR: 19 (22 Jun 2022 08:25) (18 - 20)  SpO2: 97% (22 Jun 2022 08:25) (96% - 97%)    CONSTITUTIONAL:  NAD    ENT:   Airway patent,   No thrush    EYES:   Clear bilaterally,   pupils equal,   round and reactive to light.    CARDIAC:   Normal rate,   regular rhythm.    no edema      RESPIRATORY:   No wheezing   Normal chest expansion  Not tachypneic,  No use of accessory muscles    GASTROINTESTINAL:  Abdomen soft, non-tender,   No guarding,   Positive BS    MUSCULOSKELETAL:   Range of motion is not limited  No clubbing, cyanosis    NEUROLOGICAL:   Alert and oriented   No motor deficits.    SKIN:   Skin normal color for race,           LABS:                          12.1   8.14  )-----------( 298      ( 22 Jun 2022 06:34 )             35.9                                               06-22    137  |  99  |  26<H>  ----------------------------<  105<H>  3.9   |  24  |  0.9    Ca    8.8      22 Jun 2022 06:34  Mg     2.0     06-22                                                                                                                                                    MEDICATIONS  (STANDING):  apixaban 5 milliGRAM(s) Oral every 12 hours  atorvastatin 80 milliGRAM(s) Oral at bedtime    MEDICATIONS  (PRN):  acetaminophen     Tablet .. 650 milliGRAM(s) Oral every 6 hours PRN Mild Pain (1 - 3)  meclizine 25 milliGRAM(s) Oral daily PRN Dizziness  melatonin 3 milliGRAM(s) Oral at bedtime PRN Insomnia      X-Rays reviewed: stable compared to prior

## 2022-06-22 NOTE — PROGRESS NOTE ADULT - ATTENDING COMMENTS
Mr. Escobedo is an 85 y/o male with lung mass, HTN, HLD, and new onset AFib who was admitted for Afib with RVR. His HR has been difficult-to-control on diltiazem as the medication causes him to be hypotensive.     Plan:  - s/p long-acting diltiazem 180 mg yesterday  - Ordered for Toprol 50 mg today  - No further diuresis  - Orthostatics at 1 pm to assess if Toprol can be increased for rate control  - Continue Eliquis 5 mg BID, plan to hold at least 4 doses prior to FNA (last dose on Friday 6/24 PM)  - Discontinue ASA until FNA done  - Hold ACEi as patient with relatively low BP while in Afib/flutter   - TSH 3.67, FT4 1.2, and T3 88 (all wnl)  - TTE with normal LVEF  - A1c 5.7
Mr. Escobedo is an 87 y/o male with lung mass, HTN, HLD, and new onset AFib who was admitted for Afib with RVR. His HR has been difficult-to-control on diltiazem as the medication causes him to be hypotensive.     Plan:  - s/p long-acting diltiazem 180 mg today (120 mg and then 60 mg)  - Stop diltiazem for hypotension  - Ordered for Toprol 50 mg daily at 10am tomorrow  - Discontinue Lasix 40 mg PO dialy   - Continue Eliquis 5 mg BID, plan to hold at least 4 doses prior to FNA (last dose on Friday 6/24 PM)  - Discontinue ASA until FNA done  - Hold ACEi as patient with relatively low BP while in Afib/flutter   - TSH 3.67, FT4 1.2, and T3 88 (all wnl)  - TTE with normal LVEF  - A1c 5.7

## 2022-06-23 NOTE — CHART NOTE - NSCHARTNOTEFT_GEN_A_CORE
Mr. Escobedo is an 86 year old male patient of Dr. Floyd with lung mass pending further work-up, HTN, HLD, and new onset AFib who was admitted for Afib with RVR and hypotension, patient asymptomatic.     Patient was initially seen by the Cardiology Consult team on 6/14/22 for AFib with RVR, and he spontaneously cardioverted to normal sinus rhythm. He was discharged home with plans to return to the hospital on Monday 6/20/22 for IR-guided biopsy of his left lung mass. On 6/20, patient was in Afib with RVR and hypotension. He was started on a diltiazem gtt and transition to diltiazem 180 mg PO. On this dose, his HR was rate controlled, but patient was profoundly orthostatic with SBP 79 when sitting.     Diltiazem was discontinued, and patient was started on Toprol which was uptitrated to 100 mg daily. On this dose, his HR was 100-120s and orthostatic vital signs were negative. He was given digoxin 250 mcg IV x 1. Given that he was asymptomatic, patient was discharged on digoxin 125 mcg PO daily and Toprol 100 mg PO daily. His IR-guided biopsy was rescheduled to Monday 6/27.     I spoke to the IR team, and Dr. Black is aware that the patient is being discharged today. On Monday 6/27, if there are any concerns regarding the patient's hemodynamics, please contact me to evaluate the patient.     Recommendations:   - Hold aspirin  - Stop Eliqius after Friday 6/24 PM dose  - Discharge today on Toprol 100 mg daily and digoxin 125 mcg daily  - If patient with Afib on Monday 6/27, please send COVID swab BEFORE to IR-guided biopsy so he can be directly admitted to Cardiac Telemetry after the procedure  - TSH 3.67, FT4 1.2, and T3 88 (all wnl)  - TTE with normal LVEF    Please contact me with any questions or concerns.    Laura Ferrara MD  Office: [phone]  Mobile: [phone]

## 2022-06-27 NOTE — PROGRESS NOTE ADULT - SUBJECTIVE AND OBJECTIVE BOX
Interventional Radiology Outpatient Documentation    PRE-OPERATIVE DAY OF PROCEDURE EVALUATION:     I have personally seen and examined this patient.  I agree with the history and physical which I have reviewed and noted any changes below:     Plan is for percutaneous, CT-directed needle biopsy of upper lobe left lung mass with possible chest tube placement(Signed electronically Miguel Stovall MD) 06-27-22 @ 14:15     Procedure/ risks/ benefits/ goals/ alternatives were explained.  All questions answered. Informed content obtained from patient.  Consent placed in chart.     POST-OPERATIVE PROCEDURAL EVALUATION:     Procedure:  Percutaneous, CT-directed core needle biopsy of upper lobe left lung mass    Pre-Op Diagnosis:  Left lung mass; Small left pleural effusion    Post-Op Diagnosis:  Same    Attending:  Wilman (IR) / Sunil (Anaesthesia)  Resident:  None    Anesthesia (type):  [ ] General Anesthesia  [X] Sedation-- see anaesthesia record  [ ] Spinal Anesthesia  [X] Local/Regional-- 1% Lidocaine, SQ, upper left chest    Total Face-to-Face Sedation Time:  28 minutes    Contrast:  None    Blood Loss: Minimal:  3 cc    Condition:   [ ] Critical  [ ] Serious  [ ] Fair   [X] Good    Findings/Follow up Plan of Care:  Upper lobe left lung mass biopsied with 19/20G, 10/15 cm Temno coaxial needle system with CT guidance.  Multiple tissue cores obtained.  Specimens deemed adequate by cytopathology.  Patient tolerated very well, without incident.    Specimens Removed:  Multiple 20G tissue cores    Implants:  None    Complications:  None immediate    Disposition: Recovery

## 2022-06-27 NOTE — CHART NOTE - NSCHARTNOTEFT_GEN_A_CORE
PACU ANESTHESIA ADMISSION NOTE      Procedure: MARILEE CT-guided biopsy  Post op diagnosis:  MARILEE mass    ____  Intubated  TV:______       Rate: ______      FiO2: ______    __x__  Patent Airway    __x__  Full return of protective reflexes    __x__  Full recovery from anesthesia / back to baseline status    Vitals:  T(C): --  HR: --  BP: --  RR: --  SpO2: --    Mental Status:  __x__ Awake   ___x__ Alert   _____ Drowsy   _____ Sedated    Nausea/Vomiting:  __x__ NO  ______Yes,   See Post - Op Orders          Pain Scale (0-10):  _____    Treatment: ____ None    __x__ See Post - Op/PCA Orders    Post - Operative Fluids:   ____ Oral   __x__ See Post - Op Orders    Plan: Discharge:   _x___Home       _____Floor     _____Critical Care    _____  Other:_________________

## 2022-07-15 PROBLEM — Z09 HOSPITAL DISCHARGE FOLLOW-UP: Status: ACTIVE | Noted: 2022-01-01

## 2022-07-15 PROBLEM — I10 HYPERTENSION: Status: ACTIVE | Noted: 2022-01-01

## 2022-07-15 PROBLEM — E78.00 HYPERCHOLESTEREMIA: Status: ACTIVE | Noted: 2022-01-01

## 2022-07-15 PROBLEM — M79.89 LEG SWELLING: Status: ACTIVE | Noted: 2022-01-01

## 2022-07-15 PROBLEM — Z79.01 ANTICOAGULATED: Status: ACTIVE | Noted: 2022-01-01

## 2022-07-15 PROBLEM — I48.91 ATRIAL FIBRILLATION: Status: ACTIVE | Noted: 2022-01-01

## 2022-07-15 NOTE — HISTORY OF PRESENT ILLNESS
[FreeTextEntry1] : Pt is an 85 y/o male with pmhx of HTN, HLD, and R CEA (~6 years ago) presenting to this office for followup after hospitalization.  As per patient and his son (Jack), pt was admitted to [facility] for dizziness and was found to have a lung mass.  During pre-op testing for biopsy, pt was found to be in afib with RVR.  Pt was admitted for rate control.  To note, pt had a carotid duplex done in May 2022 per his PCP (Dr. Patel) due to episodes of vertigo.  \par \par Pt denies any chest pain, shortness of breath, dizziness, palpitations, hematuria, hematochezia or black/tarry stools.  Pt does report having bilateral LE swelling that has worsened over the past few days.  Per pts son, his PCP stopped his diuretic and ordered compression socks.  Pts son states he doesn’t wear them every day.

## 2022-07-15 NOTE — CARDIOLOGY SUMMARY
[de-identified] : 7/14/2022- AF 78 bpm [de-identified] : 6/21/22\par normal LV systolic function\par mild AS, mild TR, mild MVR [de-identified] : 5/4/22- CTA neck shows 70-80% stenosis of L CCA and L carotid bulb 70-80% stenosis\par 5/4/22- venous duplex negative for DVT

## 2022-07-15 NOTE — PHYSICAL EXAM
[Well Developed] : well developed [Normal Conjunctiva] : normal conjunctiva [Good Air Entry] : good air entry [No Respiratory Distress] : no respiratory distress  [Soft] : abdomen soft [Moves all extremities] : moves all extremities [No Carotid Bruit] : no carotid bruit [Normal S1, S2] : normal S1, S2 [No Murmur] : no murmur [Clear Lung Fields] : clear lung fields [No varicosities] : no varicosities [No chronic venous stasis changes] : no chronic venous stasis changes [3+] : Left: 3+ [de-identified] : irregular rhythm [de-identified] : bruising on UEs, [de-identified] :  +3 edema to bilateral LEs, up to knees

## 2022-07-15 NOTE — ASSESSMENT
[FreeTextEntry1] : Assessment:\par #AFib - rate controlled\par #Lung mass\par #LE edema\par #L CCA and L carotid bulb 70-80% stenosis\par \par Plan:\par -given pts soft BPs, decrease metop succinate from 100 mg to 50mg daily\par -start furosemide 20mg daily for leg swelling, will titrate up PRN \par -continue digoxin 125mcg for rate control\par - continue Eiquis 5mg twice daily for stroke prevention, reviewed signs/symptoms of bleeding\par -continue ASA 81mg given carotid stenosis \par -elevate LEs whenever possible\par -continue compression stockings\par -check CBC, CMP, digoxin level\par -f/u with Dr. Davis for lung biopsy results next week\par -pt deferring vascular studies at this time\par -RTO in 2-3 months or sooner if needed\par

## 2022-07-15 NOTE — REVIEW OF SYSTEMS
[Lower Ext Edema] : lower extremity edema [Negative] : Heme/Lymph [SOB] : no shortness of breath [Dyspnea on exertion] : not dyspnea during exertion [Chest Discomfort] : no chest discomfort [Leg Claudication] : no intermittent leg claudication [Palpitations] : no palpitations [Orthopnea] : no orthopnea [PND] : no PND [Syncope] : no syncope [FreeTextEntry9] : uses walker

## 2022-07-21 NOTE — HISTORY OF PRESENT ILLNESS
[TextBox_4] : Patient states he is doing well, no complaints\par  [Adenocarcinoma] : adenocarcinoma [Squamous Cell Carcinoma] : squamous cell carcinoma

## 2022-07-21 NOTE — ASSESSMENT
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

## 2022-07-27 PROBLEM — Z80.1 FAMILY HISTORY OF LUNG CANCER: Status: ACTIVE | Noted: 2022-01-01

## 2022-07-27 NOTE — PHYSICAL EXAM
[Ambulatory and capable of all self care but unable to carry out any work activities] : Status 2- Ambulatory and capable of all self care but unable to carry out any work activities. Up and about more than 50% of waking hours [Normal] : affect appropriate [de-identified] : B/L LE edema [de-identified] : ulcer on Rt shin

## 2022-07-27 NOTE — CONSULT LETTER
[Dear  ___] : Dear  [unfilled], [Consult Letter:] : I had the pleasure of evaluating your patient, [unfilled]. [Please see my note below.] : Please see my note below. [Consult Closing:] : Thank you very much for allowing me to participate in the care of this patient.  If you have any questions, please do not hesitate to contact me. [Sincerely,] : Sincerely, [DrAmmon  ___] : Dr. RAMOS [FreeTextEntry3] : Raquel Thomas MD\par Professor [employer]\par [employer]\par Attending Physician\par [facility] [place]\par [phone]\par \par

## 2022-07-27 NOTE — HISTORY OF PRESENT ILLNESS
chlorhexidine [Disease: _____________________] : Disease: [unfilled] [de-identified] : This is an 85 y/o male with HTN, HLD, right carotid artery stenosis s/p\par CEA (6 years ago), vertigo, smoker (1-2 ppd for 25 years), diagnosed with a left lung mass when he presented to ER with dizziness in 5/22.\par \par He was also diagnosed with  new onset  AFib w/RVR. He's\par \par He was started on Eliquis \par \par  He denies any fever, chills, cough, chest pain,\par palpitations, shortness of breath, lightheadedness, syncope, headache,\par abdominal pain, nausea, vomiting, diaphoresis, no focal neurological deficits.\par No recent travel. \par \par He underwent bronchoscopy on 5/4/22 pathology neg\par \par IR guided biopsy of left lung mass was done on 6/27/22.\par  pathology c/w NSCLC ( adenosquamous).\par \par PET scan ( 6/3/22) showed\par \par Intense pathologic FDG uptake (SUV 28.1), coregistering with ill-defined \par masslike structure in the left upper lung contiguous with mediastinum and \par hilum suspicious for biologic tumor activity.\par Mass measures (FDG emission images) 3.8 x 4.1 cm coronal and 4.6 x 3.8 cm \par transaxial image 189\par \par No other sites of abnormal FDG uptake.\par \par MRI brain\par \par MR BRAIN WAW IC                      \par \par PROCEDURE DATE:  05/05/2022  \par \par \par \par INTERPRETATION:  Clinical History / Reason for exam: Vertigo, lung tumor. \par Evaluate for metastatic disease.\par \par TECHNIQUE: MRI brain with and without contrast. Multiplanar \par multisequential MRI of the brain was performed before and following the \par intravenous administration of 8 cc Gadavist (2 cc discarded) on the 3 \par Rina magnet.\par \par Correlation with CT head dated 5/2/2022\par \par FINDINGS:\par \par The ventricles and cortical sulci are normal in size and configuration.\par \par There are scattered patchy foci of T2/FLAIR signal hyperintensity within \par the cerebral hemispheric white matter consistent with chronic \par microvascular changes.\par \par There is no evidence of acute intracranial hemorrhage, extra-axial fluid \par collection or midline shift.\par \par There is no diffusion abnormality to suggest acute/subacute infarct. \par There is normal flow void present within the major vascular structures.\par \par There is no evidence for pathologic intracranial enhancement.\par \par The prenasal sinuses and mastoids are clear. The patient is status post \par right cataract surgery.\par \par IMPRESSION:\par \par 1.  No MRI evidence of intracranial metastatic disease.\par \par 2.  Mild/moderate chronic microvascular changes.\par \par \par Pt is here to discuss treatment options, requesting a pill rather than RT or chemo.\par No hemoptysis.\par No wt loss, no LAKEISHA\par  [de-identified] : NSCLC

## 2022-07-27 NOTE — REASON FOR VISIT
[Initial Consultation] : an initial consultation [Family Member] : family member [FreeTextEntry2] : Lung cancer

## 2022-07-27 NOTE — ASSESSMENT
[FreeTextEntry1] : In summary this is a 86 year old male with a diagnosis of NSCLC of the left lung. No e/o distant mets. The disease seems to be localized.\par \par RECS\par \par  I have prepared the note after I reviewed the previous notes, reviewed and interpreted the radiological and laboratory studies and have communicated those to the patient\par \par I discussed treatment options at length with the pt. I recommended RT which should not be difficult for him to tolerate.\par Pt also is looking at observation alone option. I encouraged him to meet with Rad/Onc and then finalize his plan.\par I will also order PDL-1 testing and NGS on the tumor sample.\par \par we discussed prognosis at length.\par \par He will discuss the options further with his family and inform me of his decision.\par \par RTC in 3-4 weeks.

## 2022-08-10 NOTE — LETTER GREETING
[Dear  ___] : Dear  [unfilled], [( Thank you for referring [unfilled] for consultation for _____ )] : Thank you for referring [unfilled] for consultation for [unfilled] [Please see my note below.] : Please see my note below.

## 2022-08-19 NOTE — DATA REVIEWED
[FreeTextEntry1] : reviewed pertinent medical records, labs, pathology, radiology images/report, and correspondence\par

## 2022-08-19 NOTE — DISEASE MANAGEMENT
[Clinical] : TNM Stage: c [IIA] : IIA [FreeTextEntry4] : adenosquamous carcinoma  of the MARILEE [TTNM] : 2 [NTNM] : 0 [MTNM] : 0

## 2022-08-19 NOTE — ADDENDUM
[FreeTextEntry1] : Patient underwent simulation scan on 8/11 but had developed significant pleural effusion. Referred to pulmonologist for thoracentesis; will re-sim after procedure. If pleural fluid is positive for malignancy cells (M1a), may consider palliative RT instead (2 weeks) versus pseudo-definitive RT (3 weeks)\par \par

## 2022-08-19 NOTE — REVIEW OF SYSTEMS
[Patient Intake Form Reviewed] : Patient intake form was reviewed [Lower Ext Edema] : lower extremity edema [Shortness Of Breath] : shortness of breath [Negative] : Allergic/Immunologic [FreeTextEntry4] : problems swallowing/regurgitation [FreeTextEntry7] : decreased appetite [de-identified] : frequent thirst

## 2022-08-19 NOTE — HISTORY OF PRESENT ILLNESS
[FreeTextEntry1] : Patient is referred by Dr. Thomas. 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 Notable medical history includes HTN, HLD, R carotid artery stenosis s/p CEA, vertigo, current smoker, and Afib w/ RVR on Eliquis.\par \par He initially presented to the ER with dizziness in 5/22. CT chest 5/22/22 showed Ill-defined masslike structure within the left upper lung contiguous with the mediastinum and left hilum. Mass effect upon the left upper lobe and left lower lobe bronchi with areas of atelectasis. Mass extends to the posterior aspect of the left chest wall. Atelectasis within the right lower lung. Subcentimeter mediastinal adenopathy. CT head and CTA neck were negative\par \par He had a bronchoscopy on 5/4/22 - bronchial washing was negative. EBUS FNA subcarina lymph node x2 were negative\par  \par MRI brain 5/5/22 was negative for intracranial metastasis\par \par PET 6/3/22 showed a 4.6 x 3.8 cm mass intensely avid mass (SUV 28.1) in te L upper lung contiguous with mediastinum and hilum. \par \par IR guided biopsy of left lung mass was done on 6/27/22. pathology c/w NSCLC ( adenosquamous). \par \par He was seen by Dr. Thomas on 7/27. NGS testing is pending.  PDL1 was < 1%\par \par Mr. Escobedo has shortness of breath with exertion. He is able to walk around in the yard but gets short of breath with stairs climbing. He lives at home with his son and is able to take care of his own ADLs. Walks with a walker for safety. Has chronic cough but denies hemoptysis. No weight loss but has decreased appetite. Denies dysphagia or odynophagia. Has bilateral LE edema and is on Lasix.

## 2022-08-19 NOTE — PHYSICAL EXAM
[General Appearance - In No Acute Distress] : in no acute distress [Sclera] : the sclera and conjunctiva were normal [Abdomen Soft] : soft [Normal] : oriented to person, place and time, the affect was normal, the mood was normal and not anxious [de-identified] : walks with a walker [de-identified] : tachycardia. [de-identified] : bilateral LE edema [de-identified] : UE and LE strength intact

## 2022-08-23 NOTE — HISTORY OF PRESENT ILLNESS
[Adenocarcinoma] : adenocarcinoma [Squamous Cell Carcinoma] : squamous cell carcinoma [TextBox_4] : Patient states he is doing well, no complaints\par

## 2022-08-23 NOTE — ASSESSMENT
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

## 2022-08-26 NOTE — ASU DISCHARGE PLAN (ADULT/PEDIATRIC) - BATHING
43 yo M no sig pmh presents with urinary urgency x1 week.  VSS.  Exam including  exam unremarkable.  Possible uti.  Patient low risk for STI.  Will send ua.  Dispo pending.
No change

## 2022-08-26 NOTE — ASU DISCHARGE PLAN (ADULT/PEDIATRIC) - NS MD DC FALL RISK RISK
For information on Fall & Injury Prevention, visit: [email or web address] OR  [email or web address] OR  https://www.cdc.gov/steadi/patient.html

## 2022-08-26 NOTE — ASU PATIENT PROFILE, ADULT - FALL HARM RISK - RISK INTERVENTIONS
Assistance OOB with selected safe patient handling equipment/Communicate Fall Risk and Risk Factors to all staff, patient, and family/Discuss with provider need for PT consult/Monitor gait and stability/Provide patient with walking aids - walker, cane, crutches/Reinforce activity limits and safety measures with patient and family/Toileting schedule using arm’s reach rule for commode and bathroom/Visual Cue: Yellow wristband/Bed in lowest position, wheels locked, appropriate side rails in place/Call bell, personal items and telephone in reach/Instruct patient to call for assistance before getting out of bed or chair/Non-slip footwear when patient is out of bed/[place] to call system/Physically safe environment - no spills, clutter or unnecessary equipment/Purposeful Proactive Rounding/Room/bathroom lighting operational, light cord in reach

## 2022-08-26 NOTE — ASU PREOP CHECKLIST - IDENTIFICATION BAND VERIFIED
no lesions,  no deformities,  no traumatic injuries,  no significant scars are present,  chest wall non-tender,  no masses present, breathing is unlabored without accessory muscle use,normal breath sounds
done

## 2022-09-06 NOTE — HISTORY OF PRESENT ILLNESS
[FreeTextEntry1] : 2022 OTV 600cGy/3000cGy to the left lunnd radiation treatment today. Pt is here with his son today. Reports worsening SOB on exertion. Denies changes with cough. He has been ambulating at home without oxygen and becomes SOB. Encouraged pt to keep oxygen on when ambulating. Thoracentesis done 2022, pt did not notice any improvement when fluid was drained.

## 2022-09-06 NOTE — DISEASE MANAGEMENT
[Clinical] : TNM Stage: c [IIA] : IIA [FreeTextEntry4] : adenosquamous carcinoma  of the MARILEE [TTNM] : 2 [NTNM] : 0 [MTNM] : 0 [de-identified] : 600cGy [de-identified] : 3000cGy [de-identified] : lung

## 2022-09-10 NOTE — PATIENT PROFILE ADULT - HEALTH LITERACY
Subjective:      Patient ID: Damien Villagran is a 9 y.o. male who presents today for:  Chief Complaint   [address]    [address]       HPI  Patient is her with c/o sore throat and ear pain that started this am  Fever 102.5 this am. Says it is painful when he swallows. Reports ear pain when swallowing as well./   Denies other GI or URI sx. Low grade fever in office, no OTC meds or fever reducers today. No one els/e in the home ill. Was seen in office 8/29 for AOM and was given Amoxicillin. No past medical history on file. No past surgical history on file. Social History     Socioeconomic History    Marital status: Single     Spouse name: Not on file    Number of children: Not on file    Years of education: Not on file    Highest education level: Not on file   Occupational History    Not on file   Tobacco Use    Smoking status: Never    Smokeless tobacco: Never   Substance and Sexual Activity    Alcohol use: Not on file    Drug use: Not on file    Sexual activity: Not on file   Other Topics Concern    Not on file   Social History Narrative    Not on file     Social Determinants of Health     Financial Resource Strain: Low Risk     Difficulty of Paying Living Expenses: Not hard at all   Food Insecurity: No Food Insecurity    Worried About Running Out of Food in the Last Year: Never true    Ran Out of Food in the Last Year: Never true   Transportation Needs: Not on file   Physical Activity: Not on file   Stress: Not on file   Social Connections: Not on file   Intimate Partner Violence: Not on file   Housing Stability: Not on file     No family history on file.   Allergies   Allergen Reactions    Cat Hair Extract Hives     Per mom: itching watery eyes, difficulty breathing, congestion     Current Outpatient Medications   Medication Sig Dispense Refill    cetirizine-psuedoephedrine (ZYRTEC-D) 5-120 MG per extended release tablet Take 1 tablet by mouth 2 times daily amoxicillin-clavulanate (AUGMENTIN) 250-62.5 MG/5ML suspension Take 6.5 mLs by mouth 2 times daily for 10 days 130 mL 0    Pediatric Multiple Vitamins (CHILDRENS MULTI-VITAMINS PO) Take by mouth       No current facility-administered medications for this visit. Review of Systems   Constitutional:  Positive for fever. Negative for activity change, appetite change, chills, diaphoresis, fatigue, irritability and unexpected weight change. HENT:  Positive for ear pain, sore throat and trouble swallowing. Negative for congestion, ear discharge, facial swelling, postnasal drip, rhinorrhea, sinus pressure, sinus pain, sneezing, tinnitus and voice change. Respiratory:  Negative for cough, chest tightness, shortness of breath and wheezing. Cardiovascular:  Negative for chest pain. Gastrointestinal:  Negative for abdominal pain, diarrhea, nausea and vomiting. Musculoskeletal:  Negative for arthralgias and myalgias. Skin:  Negative for rash. Neurological:  Negative for dizziness, weakness, light-headedness and headaches. Hematological:  Positive for adenopathy. Objective:   /50   Pulse 105   Temp 99.8 °F (37.7 °C)   Ht 50.5\" (128.3 cm)   Wt 57 lb 9.6 oz (26.1 kg)   SpO2 99%   BMI 15.88 kg/m²     Physical Exam  Vitals reviewed. Constitutional:       General: He is awake and active. He is not in acute distress. Appearance: Normal appearance. He is well-developed, well-groomed and normal weight. He is not ill-appearing, toxic-appearing or diaphoretic. HENT:      Head: Normocephalic and atraumatic. Right Ear: Hearing, ear canal and external ear normal. There is no impacted cerumen. Tympanic membrane is erythematous and bulging. Left Ear: Hearing, ear canal and external ear normal. There is no impacted cerumen. Tympanic membrane is erythematous and bulging. Ears:      Comments: Left TM worse then right. Nose: Nose normal.      Mouth/Throat:      Lips: Pink. Mouth: Mucous membranes are moist. No oral lesions. Dentition: No gum lesions. Tongue: No lesions. Palate: No lesions. Pharynx: Posterior oropharyngeal erythema present. No pharyngeal swelling, oropharyngeal exudate, pharyngeal petechiae, cleft palate or uvula swelling. Tonsils: No tonsillar exudate or tonsillar abscesses. 2+ on the right. 2+ on the left. Eyes:      Extraocular Movements: Extraocular movements intact. Neck:      Trachea: Trachea normal.   Cardiovascular:      Rate and Rhythm: Normal rate and regular rhythm. Pulses: Normal pulses. Heart sounds: Normal heart sounds, S1 normal and S2 normal.   Pulmonary:      Effort: Pulmonary effort is normal.      Breath sounds: Normal breath sounds and air entry. Musculoskeletal:         General: Normal range of motion. Cervical back: Normal range of motion and neck supple. No tenderness. Lymphadenopathy:      Cervical: Cervical adenopathy present. Right cervical: Superficial cervical adenopathy present. Left cervical: Superficial cervical adenopathy present. Skin:     General: Skin is warm and dry. Capillary Refill: Capillary refill takes less than 2 seconds. Neurological:      General: No focal deficit present. Mental Status: He is alert and oriented for age. Mental status is at baseline. Psychiatric:         Attention and Perception: Attention and perception normal.         Mood and Affect: Mood and affect normal.         Speech: Speech normal.         Behavior: Behavior normal. Behavior is cooperative. Thought Content: Thought content normal.         Cognition and Memory: Cognition and memory normal.         Judgment: Judgment normal.       Assessment:       Diagnosis Orders   1. Bilateral acute serous otitis media, recurrence not specified        2. Tonsillitis          No results found for this visit on 09/10/22.    Plan:     Assessment & Plan   Sanjana Nino was seen today for otalgia. Diagnoses and all orders for this visit:    Bilateral acute serous otitis media, recurrence not specified    Tonsillitis    Other orders  -     amoxicillin-clavulanate (AUGMENTIN) 250-62.5 MG/5ML suspension; Take 6.5 mLs by mouth 2 times daily for 10 days    Discussed with patient will treat with oral ANTB for middle ear infection. Advised on administration and SE of the ANTB. Encouraged to take probiotic or ear yogurt while on ANTB. Advised will likely start to improve in 2-3 days with medication and advised f/u if sx do not improve or worsen. Advised f/u with PCP once finished with ANTB treatment for to evaluate ear. Advised may use Motrin and Tylenol as needed for pain. Declined strep testing at this time. Antibiotic Instructions: Complete the full course of antibiotics as ordered. Take each dose with a small snack or meal to lessen potential GI upset. To prevent antibiotic resistance, please take medication as ordered and for the full duration even if you start to feel better. Consider intake of yogurt or probiotic during antibiotic use and for a few days after to help reduce the risk of developing a secondary infection. Take the yogurt or probiotic at least 2 hours after taking the antibiotic. No orders of the defined types were placed in this encounter. Orders Placed This Encounter   Medications    amoxicillin-clavulanate (AUGMENTIN) 250-62.5 MG/5ML suspension     Sig: Take 6.5 mLs by mouth 2 times daily for 10 days     Dispense:  130 mL     Refill:  0     Medications Discontinued During This Encounter   Medication Reason    sucralfate (CARAFATE) 1 GM/10ML suspension Therapy completed    famotidine (PEPCID) 40 MG/5ML suspension LIST CLEANUP     Return for worsening of condition, if symptoms do not improve in 3-5 days. Reviewed with the patient/family: current clinical status & medications.   Side effects of the medication prescribed today, as well as treatment plan/rationale and result expectations have been discussed with the patient/family who expresses understanding. Patient will be discharged home in stable condition. Follow up with PCP to evaluate treatment results or return if symptoms worsen or fail to improve. Discussed signs and symptoms which require immediate follow-up in ED/call to 911. Understanding verbalized. I have reviewed the patient's medical history in detail and updated the computerized patient record.     Radha Valdez, DANIEL - CNP no

## 2022-09-13 PROBLEM — R91.8 LUNG MASS: Status: ACTIVE | Noted: 2022-01-01

## 2022-09-13 NOTE — HISTORY OF PRESENT ILLNESS
[FreeTextEntry1] : 2022 OTV 2100cGy/3000cGy to the left lung: Pt and son denies respiratory changes since last week. He has home oxygen but has not been wearing it when he ambulates. Re-educated patient on the need for oxygen with ambulation. 3 radiation treatments remaining. \par \par 2022 OTV 600cGy/3000cGy to the left lunnd radiation treatment today. Pt is here with his son today. Reports worsening SOB on exertion. Denies changes with cough. He has been ambulating at home without oxygen and becomes SOB. Encouraged pt to keep oxygen on when ambulating. Thoracentesis done 2022, pt did not notice any improvement when fluid was drained.

## 2022-09-13 NOTE — PHYSICAL EXAM
[General Appearance - In No Acute Distress] : in no acute distress [Thin] : thin [] : no respiratory distress [Abdomen Soft] : soft [de-identified] : 3L oxygen. Decreased breath sounds on Left [de-identified] : bilateral LE edema

## 2022-09-13 NOTE — DISEASE MANAGEMENT
[Clinical] : TNM Stage: c [IIA] : IIA [FreeTextEntry4] : adenosquamous carcinoma  of the MARILEE [TTNM] : 2 [NTNM] : 0 [MTNM] : 0 [de-identified] : 3979cGy [de-identified] : 3000cGy [de-identified] : lung

## 2022-09-13 NOTE — REVIEW OF SYSTEMS
[Dysphagia: Grade 0] : Dysphagia: Grade 0 [Esophagitis: Grade 0] : Esophagitis: Grade 0 [Fatigue: Grade 1 - Fatigue relieved by rest] : Fatigue: Grade 1 - Fatigue relieved by rest [Dyspnea: Grade 2 - Shortness of breath with minimal exertion; limiting instrumental ADL] : Dyspnea: Grade 2 - Shortness of breath with minimal exertion; limiting instrumental ADL [Dermatitis Radiation: Grade 1 - Faint erythema or dry desquamation] : Dermatitis Radiation: Grade 1 - Faint erythema or dry desquamation [Cough: Grade 1 - Mild symptoms; nonprescription intervention indicated] : Cough: Grade 1 - Mild symptoms; nonprescription intervention indicated

## 2022-09-19 PROBLEM — J90 PLEURAL EFFUSION: Status: ACTIVE | Noted: 2022-01-01

## 2022-09-19 PROBLEM — C34.92 NON-SMALL CELL CANCER OF LEFT LUNG: Status: ACTIVE | Noted: 2022-01-01

## 2022-09-19 NOTE — PHYSICAL EXAM
[No Acute Distress] : no acute distress [Normal Oropharynx] : normal oropharynx [Normal Appearance] : normal appearance [No Neck Mass] : no neck mass [Normal Rate/Rhythm] : normal rate/rhythm [Normal S1, S2] : normal s1, s2 [No Murmurs] : no murmurs [No Resp Distress] : no resp distress [No Abnormalities] : no abnormalities [Benign] : benign [Normal Gait] : normal gait [No Clubbing] : no clubbing [No Cyanosis] : no cyanosis [No Edema] : no edema [FROM] : FROM [Normal Color/ Pigmentation] : normal color/ pigmentation [No Focal Deficits] : no focal deficits [Oriented x3] : oriented x3 [Normal Affect] : normal affect [TextBox_68] : NO BS left lung

## 2022-09-19 NOTE — ASSESSMENT
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

## 2022-09-22 NOTE — ED PROVIDER NOTE - CARE PLAN
Principal Discharge DX:	Pleural effusion  Secondary Diagnosis:	Atrial fibrillation   1 Principal Discharge DX:	Pleural effusion  Secondary Diagnosis:	Atrial fibrillation  Secondary Diagnosis:	Acute respiratory failure with hypoxia

## 2022-09-22 NOTE — ED PROVIDER NOTE - CLINICAL SUMMARY MEDICAL DECISION MAKING FREE TEXT BOX
pt s/o to me from Dr. Camacho- pt presenting with shortness of breath, AC recently dc-d for thora tomorrow. pt saturating well on bipap currently. CTPE neg. rapid afib, cardizem given with improvement. prior to admission pt got out of bed to try and make a call, had mech slip/fall, landed on bottom. no other sx traumatic injury. NCAT PERRLA no sx trauma to chest/abd/pelvis. 2+ equal pulses throughout NVI. no bony ttp throughout. dw ICU will admit for further eval

## 2022-09-22 NOTE — ED PROVIDER NOTE - ATTENDING CONTRIBUTION TO CARE
87-year-old male with a past medical history significant for lung cancer, hypertension, hyperlipidemia, PAD, right carotid artery stenosis status post CEA, vertigo, who presents with shortness of breath.  Patient states that for the last couple days he has been having worsening shortness of breath and lower leg swelling.  Of note patient is scheduled to have a pigtail placed by pulmonology tomorrow and has not been taking his Eliquis for the past three day.  Patient denies any fevers chills nausea vomiting chest pain or any other medical complaints.    VITAL SIGNS: I have reviewed nursing notes and confirm.  CONSTITUTIONAL: non-toxic, well appearing  SKIN: no rash, no petechiae.  EYES:  EOMI, pink conjunctiva, anicteric  ENT: tongue midline, no exudates, MMM  NECK: Supple; no meningismus, no JVD  CARD: RRR, no murmurs, equal radial pulses bilaterally 2+  RESP: tacypneic, crackles noted throughout all lung fields   ABD: Soft, non-tender, non-distended, no peritoneal signs, no HSM, no CVA tenderness  EXT: Normal ROM x4. No edema. No calves tenderness  NEURO: Alert, oriented x3.     a/p  87 yr old m that presents with sob   -labs  -ekg  -imaging  -pulm  -reassess  -dispo pending

## 2022-09-22 NOTE — ED PROCEDURE NOTE - PROCEDURE NAME, MLM
Point of Care Ultrasound Cardiac
Point of Care Ultrasound Vascular Access
Point of Care Ultrasound Cardiac

## 2022-09-22 NOTE — ED PROVIDER NOTE - PHYSICAL EXAMINATION
CONSTITUTIONAL: in respiratory distress  SKIN: warm, dry  HEAD: Normocephalic  EYES: no conjunctival erythema  ENT: no nasal discharge, airway clear speaking full sentences no drooling   NECK: full ROM, no swelling  CARD:  tachycardic   RESP: increased wob tachypneic no wheezing, diffuse significant rales b/l  ABD: soft, non-distended, non-tender  EXT: moving all extremities spontaneously  NEURO: alert and oriented x3, grossly unremarkable  PSYCH: cooperative, appropriate

## 2022-09-22 NOTE — ED PROCEDURE NOTE - CARDIAC WINDOWS
Parasternal Long/Parasternal Short/Apical 4-Chamber/Sub-Xyphoid View
Parasternal Long/Parasternal Short/Apical 4-Chamber/Sub-Xyphoid View

## 2022-09-22 NOTE — ED PROVIDER NOTE - PROGRESS NOTE DETAILS
ER: pt on vbg noted to have a CO2 of 93, pt placed on bipap ER: pt signed out to Dr. Reyes pending CTA. reassess. admit ER: pt on vbg noted to have a CO2 of 93, pt placed on bipap. pt noted to be in afib in rvr. will give dilt

## 2022-09-22 NOTE — ED ADULT TRIAGE NOTE - CHIEF COMPLAINT QUOTE
BIBA from home for increased difficulty breathing, has a history of lung CA. is scheduled to have a pigtail placed to drain pleural fluid.   pt also reports falling and hitting his head on Tuesday, pt takes Eliquis.

## 2022-09-22 NOTE — ED PROVIDER NOTE - OBJECTIVE STATEMENT
87M PMHx of HTN, HLD, PAD with right carotid artery stenosis s/p CEA (6 years ago), vertigo, previous tobacco user, recently Dx lung mass in May of 2022, Paroxsymal atrial fibrillation initially Dx 6/14/22 who follows with dr fine. he was scheduled for pleurocentesis tomorrow for pleural effusion with dr. fine. he had one done 3 weeks ago which drained 1.8L of fluid. he has worsening SOB so his son brought him in for evaluation. denies cp back pain nausea vomiting. 2 days ago he walked into the door hit his right forearm on door and had skin avulsion which bled significantly per son but controlled with pressure.

## 2022-09-23 NOTE — ED ADULT NURSE REASSESSMENT NOTE - NS ED NURSE REASSESS COMMENT FT1
Pt leads were noted to be alarming by RN at desk. RN immediately responded and found patient on floor on his right side. Pt denies any head injury, LOC, pain after time of injury. Pt assessed by MD Lorna and MD Trey. Appropriate scans ordered. Pt vitals reassessed, stable and pt at baseline mental status.

## 2022-09-23 NOTE — H&P ADULT - NSHPPHYSICALEXAM_GEN_ALL_CORE
- Physical Exam in ED  * General Appearance: Alert, cooperative, interactive, oriented to time, place, and person  * Head: Normocephalic, without obvious abnormality, atraumatic  * Eyes: PERRL, conjunctiva/corneas clear, EOM's intact, fundi benign, both eyes  * Neck: Supple, symmetrical, trachea midline, no adenopathy;   * Back: Symmetric, no curvature, ROM normal, no CVA tenderness  * Lungs: On BiPAP 16/8 FiO2 40%, reduced air entry along left lung, no audible wheezes or rhonchi  * Heart: Regular Rate and Rhythm, normal S1 and S2, no audible murmur, rub, or gallop  * Abdomen: Symmetric, non-distended, soft, non-tender, bowel sounds active all four quadrants, no masses, no organomegaly (no hepatosplenomegaly)  * Extremities: stasis dermatitis, no cyanosis, +3 lower extremity pitting edema bilaterally, adequate dorsalis pedis pulses  * Pulses: 2+ and symmetric all extremities  * Skin: Skin color, texture, turgor normal, no rashes or lesions  * Lymph nodes: Cervical, supraclavicular, and axillary nodes normalt

## 2022-09-23 NOTE — H&P ADULT - HISTORY OF PRESENT ILLNESS
Location: [facility] ER Hold 016 A ([facility] ER Hold)  Patient Name: KEIKO SALDAÑA  Age: 87y  Gender: Male      History of Present Illness  Mr. Saldaña is an 87 year old (ex-smoker) male patient known to have:  - Baseline AOx3; lives with son  - History of MARILEE Non Small Cell Lung Cancer and Large Pleural Effusion s/p Pleurodesis 08/26/2022. On Home O2 (3LPM NC). Follows with Dr Fletcher  - Right Carotid Artery Stenosis s/p CEA in 2016  - Peripheral Artery Disease  - Hypertension  - Paroxysmal Atrial Fibrillation. Forgot name of cardiologist. On eliquis 5mg BID  - Vertigo  - History of cataract surgery  - History of umbilical hernia repair      He was brought to the ED on 09/22 for evaluation of worsening SOB.  History goes back to few days PTP when the patient started complaining of worsening SOB on minimal exertion and at rest on 3LPM home O2.  This has been associated with leg swelling and orthopnea in the absence of new cough, runny nose, sore throat, chest pain, palpitations, diaphoresis, or light headedness.      On review of systems, patient denies any recent fever, chills, night sweats, urinary symptoms (urinary frequency, urgency, intermittence, dysuria, foul smelling urine, cloudy urine), change in bowel movements (diarrhea or constipation), abdominal pain, headache, nausea, or vomiting.   No sick contacts.   No recent travel or exposure to recent travelers.      Upon presentation to the ED, the patient's vitals:  - /57mmHg  - -> 140 bpm; confirmed afib with RVR s/p IV Cardizem 10mg x 3 times then initiation of cardizem drip with improvement in HR to 101 bpm  - T 36.6 degrees  - SaO2 97% on 4LPM NC -> placed on BiPAP 16/8 FiO2 40% later  - RR 26 bpm  - Blood Gas Profile - Venous (09.22.22 @ 16:35)    pH, Venous: 7.42    pCO2, Venous: 93 mmHg    pO2, Venous: 21 mmHg    HCO3, Venous: 60 mmol/L    Base Excess, Venous: 29.9 mmol/L    Oxygen Saturation, Venous: 24.4 %      Investigations   Laboratory Workup  - CBC:                        12.8   9.60  )-----------( 243      ( 22 Sep 2022 16:30 )             40.6     - Chemistry:  09-22    137  |  82<L>  |  19  ----------------------------<  100<H>  3.8   |  49<HH>  |  0.7    Ca    8.7      22 Sep 2022 16:30    TPro  6.6  /  Alb  2.9<L>  /  TBili  0.8  /  DBili  x   /  AST  33  /  ALT  27  /  AlkPhos  94  09-22      CARDIAC MARKERS ( 22 Sep 2022 16:30 )  x     / 0.02 ng/mL / x     / x     / x          Microbiological Workup  * COVID negative  * Influenza A and B negative      Radiological Workup  * CT Angio Chest PE Protocol w/ IV Cont (09.23.22 @ 02:07) No evidence of pulmonary embolus Large left pleural effusion with consolidative volume loss of the left lung. This is associated with prevascular suboptimally imaged mass which contributes to narrowing and partial opacification of the left mainstem  bronchus. No definite lymphadenopathy by size criteria. Consolidative process in the right lower lobe may be secondary to  atelectasis as well as an infectious process.      - Patient had a mechanical fall in ED per sign out (no details provided and patient thinks it was mechanical; no light headedness or blacking out; unsure about HT; no LOC or seizure-like activity; no confusion after fall)  - Patient was placed on BiPAP 16/8 FiO2 40% in ED  - For atrial fibrillation with RVR, patient was given IV Cardizem 10mg x3 doses followed by initiation of Cardizem drip  - He received IV Rocephin 1g x1 and Azithromycin 500mg x1 doses   - He also received 1L LR bolus in ED  - He will be admitted to MICU for further investigations, monitoring, and management

## 2022-09-23 NOTE — CHART NOTE - NSCHARTNOTEFT_GEN_A_CORE
Consent for the procedure as well as risks and benefits were explained to the patient and son at bedside. Ultrasound evaluation of the left pleural effusion was performed. Free flowing fluid in moderate-large amount observed. Patient was placed in semi mcnamara position, left arm raised above head. The left anterior chest was cleaned with sterile chlorhexidine and draped with a wide sterile drape. The Tunneled pleural catheter was placed using seldinger technique. Guidewire was retrieved, post procedure CXR ordered. Patient tolerated procedure well. 2000 ml was drained from the left chest. Consent for the procedure as well as risks and benefits were explained to the patient and son at bedside. Ultrasound evaluation of the left pleural effusion was performed. Free flowing fluid in moderate-large amount observed. Patient was placed in semi mcnamara position, left arm raised above head. The left anterior chest was cleaned with sterile chlorhexidine and draped with a wide sterile drape. The Tunneled pleural catheter was placed using seldinger technique. Guidewire was retrieved, post procedure CXR ordered. Patient tolerated procedure well. 2000 ml was drained from the left chest.    Attending attestation:    I was present and supervised the whole procedure

## 2022-09-23 NOTE — CONSULT NOTE ADULT - SUBJECTIVE AND OBJECTIVE BOX
Patient is a 87y old  Male who presents with a chief complaint of Hypoxic Respiratory Failure (23 Sep 2022 03:45)      HPI:  Location: [facility] ER Hold 016 A ([facility] ER Hold)  Patient Name: KEIKO SALDAÑA  Age: 87y  Gender: Male      History of Present Illness  Mr. Saldaña is an 87 year old (ex-smoker) male patient known to have:  - Baseline AOx3; lives with son  - History of MARILEE Non Small Cell Lung Cancer and Large Pleural Effusion s/p Pleurodesis 08/26/2022. On Home O2 (3LPM NC). Follows with Dr Fletcher  - Right Carotid Artery Stenosis s/p CEA in 2016  - Peripheral Artery Disease  - Hypertension  - Paroxysmal Atrial Fibrillation. Forgot name of cardiologist. On eliquis 5mg BID  - Vertigo  - History of cataract surgery  - History of umbilical hernia repair      He was brought to the ED on 09/22 for evaluation of worsening SOB.  History goes back to few days PTP when the patient started complaining of worsening SOB on minimal exertion and at rest on 3LPM home O2.  This has been associated with leg swelling and orthopnea in the absence of new cough, runny nose, sore throat, chest pain, palpitations, diaphoresis, or light headedness.      On review of systems, patient denies any recent fever, chills, night sweats, urinary symptoms (urinary frequency, urgency, intermittence, dysuria, foul smelling urine, cloudy urine), change in bowel movements (diarrhea or constipation), abdominal pain, headache, nausea, or vomiting.   No sick contacts.   No recent travel or exposure to recent travelers.      Upon presentation to the ED, the patient's vitals:  - /57mmHg  - -> 140 bpm; confirmed afib with RVR s/p IV Cardizem 10mg x 3 times then initiation of cardizem drip with improvement in HR to 101 bpm  - T 36.6 degrees  - SaO2 97% on 4LPM NC -> placed on BiPAP 16/8 FiO2 40% later  - RR 26 bpm  - Blood Gas Profile - Venous (09.22.22 @ 16:35)    pH, Venous: 7.42    pCO2, Venous: 93 mmHg    pO2, Venous: 21 mmHg    HCO3, Venous: 60 mmol/L    Base Excess, Venous: 29.9 mmol/L    Oxygen Saturation, Venous: 24.4 %      Investigations   Laboratory Workup  - CBC:                        12.8   9.60  )-----------( 243      ( 22 Sep 2022 16:30 )             40.6     - Chemistry:  09-22    137  |  82<L>  |  19  ----------------------------<  100<H>  3.8   |  49<HH>  |  0.7    Ca    8.7      22 Sep 2022 16:30    TPro  6.6  /  Alb  2.9<L>  /  TBili  0.8  /  DBili  x   /  AST  33  /  ALT  27  /  AlkPhos  94  09-22      CARDIAC MARKERS ( 22 Sep 2022 16:30 )  x     / 0.02 ng/mL / x     / x     / x          Microbiological Workup  * COVID negative  * Influenza A and B negative      Radiological Workup  * CT Angio Chest PE Protocol w/ IV Cont (09.23.22 @ 02:07) No evidence of pulmonary embolus Large left pleural effusion with consolidative volume loss of the left lung. This is associated with prevascular suboptimally imaged mass which contributes to narrowing and partial opacification of the left mainstem  bronchus. No definite lymphadenopathy by size criteria. Consolidative process in the right lower lobe may be secondary to  atelectasis as well as an infectious process.      - Patient had a mechanical fall in ED per sign out (no details provided and patient thinks it was mechanical; no light headedness or blacking out; unsure about HT; no LOC or seizure-like activity; no confusion after fall)  - Patient was placed on BiPAP 16/8 FiO2 40% in ED  - For atrial fibrillation with RVR, patient was given IV Cardizem 10mg x3 doses followed by initiation of Cardizem drip  - He received IV Rocephin 1g x1 and Azithromycin 500mg x1 doses   - He also received 1L LR bolus in ED  - He will be admitted to MICU for further investigations, monitoring, and management       (23 Sep 2022 03:45)      PAST MEDICAL & SURGICAL HISTORY:  Vertigo  HTN (hypertension)  HLD (hyperlipidemia)  History of CEA (carotid endarterectomy)  S/P cataract surgery  H umbilical hernia repair      SOCIAL HX:   Smoking   exsmoker 1ppd for 20+ years, quit 40 years ago                      ETOH   social                         Other  Retired transit  of 23yrs  Lives in [place] and recently staying w/ son on [address]    FAMILY HISTORY:  FH: HTN (hypertension) (Mother)    FH: lung cancer (Father)    FHx: myocardial infarction (Child)    :  No known cardiovacular family hisotry     Review Of Systems:     All ROS are negative except per HPI       Allergies    No Known Allergies    Intolerances          PHYSICAL EXAM    ICU Vital Signs Last 24 Hrs  T(C): 34.6 (23 Sep 2022 05:00), Max: 36.8 (22 Sep 2022 16:44)  T(F): 94.2 (23 Sep 2022 05:00), Max: 98.3 (22 Sep 2022 16:44)  HR: 104 (23 Sep 2022 07:00) (80 - 140)  BP: 85/60 (23 Sep 2022 07:00) (57/46 - 146/88)  BP(mean): 68 (23 Sep 2022 07:00) (51 - 103)  ABP: --  ABP(mean): --  RR: 21 (23 Sep 2022 07:00) (18 - 27)  SpO2: 99% (23 Sep 2022 07:00) (85% - 100%)    O2 Parameters below as of 23 Sep 2022 05:00  Patient On (Oxygen Delivery Method): BiPAP/CPAP    O2 Concentration (%): 40        CONSTITUTIONAL:  In NAD.    ENT:   Airway patent,   Mouth with normal mucosa.   No thrush    EYES:   pupils equal,   round and reactive to light.    CARDIAC:   tachycardic  Irregular rhythm.    Heart sounds S1, S2.   B/L LL edema with erythema    Vascular:   normal systolic impulse  no bruits    RESPIRATORY:   No wheezing   Normal chest expansion  No use of accessory muscles    GASTROINTESTINAL:  Abdomen soft   Non-tender,   No guarding,   + BS    GENITOURINARY  normal genitalia for sex  no edema    MUSCULOSKELETAL:   Range of motion is not limited,  Nno clubbing, cyanosis    NEUROLOGICAL:   Alert and oriented   No motor or sensory deficits.  Pertinent DTRs normal    SKIN:   Skin normal color for race,   Warm and dry  No evidence of rash.        09-22-22 @ 07:01  -  09-23-22 @ 07:00  --------------------------------------------------------  IN:    Diltiazem: 10 mL    IV PiggyBack: 300 mL    Lactated Ringers Bolus: 500 mL  Total IN: 810 mL    OUT:    Oral Fluid: 0 mL  Total OUT: 0 mL    Total NET: 810 mL          LABS:                          11.2   8.84  )-----------( 215      ( 23 Sep 2022 07:40 )             35.6                                               09-22    137  |  82<L>  |  19  ----------------------------<  100<H>  3.8   |  49<HH>  |  0.7    Ca    8.7      22 Sep 2022 16:30    TPro  6.6  /  Alb  2.9<L>  /  TBili  0.8  /  DBili  x   /  AST  33  /  ALT  27  /  AlkPhos  94  09-22      PT/INR - ( 23 Sep 2022 06:15 )   PT: 12.70 sec;   INR: 1.11 ratio         PTT - ( 23 Sep 2022 06:15 )  PTT:44.6 sec                                           CARDIAC MARKERS ( 22 Sep 2022 16:30 )  x     / 0.02 ng/mL / x     / x     / x                                                LIVER FUNCTIONS - ( 22 Sep 2022 16:30 )  Alb: 2.9 g/dL / Pro: 6.6 g/dL / ALK PHOS: 94 U/L / ALT: 27 U/L / AST: 33 U/L / GGT: x                                                                                                                                       X-Rays reviewed:                                                                                    ECHO    CXR interpreted by me:    MEDICATIONS  (STANDING):  albuterol/ipratropium for Nebulization 3 milliLiter(s) Nebulizer every 6 hours  atorvastatin 80 milliGRAM(s) Oral at bedtime  azithromycin  IVPB 500 milliGRAM(s) IV Intermittent every 24 hours  cefTRIAXone   IVPB 1000 milliGRAM(s) IV Intermittent every 24 hours  chlorhexidine 2% Cloths 1 Application(s) Topical <User Schedule>  digoxin     Tablet 125 MICROGram(s) Oral daily  furosemide   Injectable 40 milliGRAM(s) IV Push daily  lactated ringers Bolus 500 milliLiter(s) IV Bolus once  methylPREDNISolone sodium succinate Injectable 60 milliGRAM(s) IV Push every 8 hours  metoprolol succinate ER 50 milliGRAM(s) Oral daily  pantoprazole    Tablet 40 milliGRAM(s) Oral before breakfast    MEDICATIONS  (PRN):  acetaminophen     Tablet .. 650 milliGRAM(s) Oral every 6 hours PRN Temp greater or equal to 38C (100.4F), Mild Pain (1 - 3)  meclizine 25 milliGRAM(s) Oral every 8 hours PRN Dizziness         Patient is a 87y old  Male who presents with a chief complaint of Hypoxic Respiratory Failure (23 Sep 2022 03:45)      HPI:  Location: [facility] ER Hold 016 A ([facility] ER Hold)  Patient Name: KEIKO SALDAÑA  Age: 87y  Gender: Male      History of Present Illness  Mr. Saldaña is an 87 year old (ex-smoker) male patient known to have:  - Baseline AOx3; lives with son  - History of MARILEE Non Small Cell Lung Cancer and Large Pleural Effusion s/p Pleurodesis 08/26/2022. On Home O2 (3LPM NC). Follows with Dr Fletcher  - Right Carotid Artery Stenosis s/p CEA in 2016  - Peripheral Artery Disease  - Hypertension  - Paroxysmal Atrial Fibrillation. Forgot name of cardiologist. On eliquis 5mg BID  - Vertigo  - History of cataract surgery  - History of umbilical hernia repair      He was brought to the ED on 09/22 for evaluation of worsening SOB.  History goes back to few days PTP when the patient started complaining of worsening SOB on minimal exertion and at rest on 3LPM home O2.  This has been associated with leg swelling and orthopnea in the absence of new cough, runny nose, sore throat, chest pain, palpitations, diaphoresis, or light headedness.      On review of systems, patient denies any recent fever, chills, night sweats, urinary symptoms (urinary frequency, urgency, intermittence, dysuria, foul smelling urine, cloudy urine), change in bowel movements (diarrhea or constipation), abdominal pain, headache, nausea, or vomiting.   No sick contacts.   No recent travel or exposure to recent travelers.      Upon presentation to the ED, the patient's vitals:  - /57mmHg  - -> 140 bpm; confirmed afib with RVR s/p IV Cardizem 10mg x 3 times then initiation of cardizem drip with improvement in HR to 101 bpm  - T 36.6 degrees  - SaO2 97% on 4LPM NC -> placed on BiPAP 16/8 FiO2 40% later  - RR 26 bpm  - Blood Gas Profile - Venous (09.22.22 @ 16:35)    pH, Venous: 7.42    pCO2, Venous: 93 mmHg    pO2, Venous: 21 mmHg    HCO3, Venous: 60 mmol/L    Base Excess, Venous: 29.9 mmol/L    Oxygen Saturation, Venous: 24.4 %      Investigations   Laboratory Workup  - CBC:                        12.8   9.60  )-----------( 243      ( 22 Sep 2022 16:30 )             40.6     - Chemistry:  09-22    137  |  82<L>  |  19  ----------------------------<  100<H>  3.8   |  49<HH>  |  0.7    Ca    8.7      22 Sep 2022 16:30    TPro  6.6  /  Alb  2.9<L>  /  TBili  0.8  /  DBili  x   /  AST  33  /  ALT  27  /  AlkPhos  94  09-22      CARDIAC MARKERS ( 22 Sep 2022 16:30 )  x     / 0.02 ng/mL / x     / x     / x          Microbiological Workup  * COVID negative  * Influenza A and B negative      Radiological Workup  * CT Angio Chest PE Protocol w/ IV Cont (09.23.22 @ 02:07) No evidence of pulmonary embolus Large left pleural effusion with consolidative volume loss of the left lung. This is associated with prevascular suboptimally imaged mass which contributes to narrowing and partial opacification of the left mainstem  bronchus. No definite lymphadenopathy by size criteria. Consolidative process in the right lower lobe may be secondary to  atelectasis as well as an infectious process.      - Patient had a mechanical fall in ED per sign out (no details provided and patient thinks it was mechanical; no light headedness or blacking out; unsure about HT; no LOC or seizure-like activity; no confusion after fall)  - Patient was placed on BiPAP 16/8 FiO2 40% in ED  - For atrial fibrillation with RVR, patient was given IV Cardizem 10mg x3 doses followed by initiation of Cardizem drip  - He received IV Rocephin 1g x1 and Azithromycin 500mg x1 doses   - He also received 1L LR bolus in ED  - He will be admitted to MICU for further investigations, monitoring, and management       (23 Sep 2022 03:45)      PAST MEDICAL & SURGICAL HISTORY:  Vertigo  HTN (hypertension)  HLD (hyperlipidemia)  History of CEA (carotid endarterectomy)  S/P cataract surgery  H umbilical hernia repair      SOCIAL HX:   Smoking   exsmoker 1ppd for 20+ years, quit 40 years ago                      ETOH   social                         Other  Retired transit  of 23yrs  Lives in [place] and recently staying w/ son on [address]    FAMILY HISTORY:  FH: HTN (hypertension) (Mother)    FH: lung cancer (Father)    FHx: myocardial infarction (Child)    :  No known cardiovacular family hisotry     Review Of Systems:     All ROS are negative except per HPI       Allergies    No Known Allergies    Intolerances          PHYSICAL EXAM    ICU Vital Signs Last 24 Hrs  T(C): 34.6 (23 Sep 2022 05:00), Max: 36.8 (22 Sep 2022 16:44)  T(F): 94.2 (23 Sep 2022 05:00), Max: 98.3 (22 Sep 2022 16:44)  HR: 104 (23 Sep 2022 07:00) (80 - 140)  BP: 85/60 (23 Sep 2022 07:00) (57/46 - 146/88)  BP(mean): 68 (23 Sep 2022 07:00) (51 - 103)  ABP: --  ABP(mean): --  RR: 21 (23 Sep 2022 07:00) (18 - 27)  SpO2: 99% (23 Sep 2022 07:00) (85% - 100%)    O2 Parameters below as of 23 Sep 2022 05:00  Patient On (Oxygen Delivery Method): BiPAP/CPAP    O2 Concentration (%): 40        CONSTITUTIONAL:  In NAD.    ENT:   Airway patent,   Mouth with normal mucosa.   No thrush    EYES:   pupils equal,   round and reactive to light.    CARDIAC:   tachycardic  Irregular rhythm.    Heart sounds S1, S2.   B/L LL edema with erythema      RESPIRATORY:   No wheezing   Normal chest expansion  No use of accessory muscles  NO BS Left lung     GASTROINTESTINAL:  Abdomen soft   Non-tender,   No guarding,   + BS    GENITOURINARY  normal genitalia for sex  no edema    MUSCULOSKELETAL:   Range of motion is not limited,  Nno clubbing, cyanosis    NEUROLOGICAL:   Alert and oriented   No motor or sensory deficits.  Pertinent DTRs normal    SKIN:   Skin normal color for race,   Warm and dry  No evidence of rash.        09-22-22 @ 07:01  -  09-23-22 @ 07:00  --------------------------------------------------------  IN:    Diltiazem: 10 mL    IV PiggyBack: 300 mL    Lactated Ringers Bolus: 500 mL  Total IN: 810 mL    OUT:    Oral Fluid: 0 mL  Total OUT: 0 mL    Total NET: 810 mL          LABS:                          11.2   8.84  )-----------( 215      ( 23 Sep 2022 07:40 )             35.6                                               09-22    137  |  82<L>  |  19  ----------------------------<  100<H>  3.8   |  49<HH>  |  0.7    Ca    8.7      22 Sep 2022 16:30    TPro  6.6  /  Alb  2.9<L>  /  TBili  0.8  /  DBili  x   /  AST  33  /  ALT  27  /  AlkPhos  94  09-22      PT/INR - ( 23 Sep 2022 06:15 )   PT: 12.70 sec;   INR: 1.11 ratio         PTT - ( 23 Sep 2022 06:15 )  PTT:44.6 sec                                           CARDIAC MARKERS ( 22 Sep 2022 16:30 )  x     / 0.02 ng/mL / x     / x     / x                                                LIVER FUNCTIONS - ( 22 Sep 2022 16:30 )  Alb: 2.9 g/dL / Pro: 6.6 g/dL / ALK PHOS: 94 U/L / ALT: 27 U/L / AST: 33 U/L / GGT: x                                                                                                                                       X-Rays reviewed:                                                                                    ECHO    CXR interpreted by me:    MEDICATIONS  (STANDING):  albuterol/ipratropium for Nebulization 3 milliLiter(s) Nebulizer every 6 hours  atorvastatin 80 milliGRAM(s) Oral at bedtime  azithromycin  IVPB 500 milliGRAM(s) IV Intermittent every 24 hours  cefTRIAXone   IVPB 1000 milliGRAM(s) IV Intermittent every 24 hours  chlorhexidine 2% Cloths 1 Application(s) Topical <User Schedule>  digoxin     Tablet 125 MICROGram(s) Oral daily  furosemide   Injectable 40 milliGRAM(s) IV Push daily  lactated ringers Bolus 500 milliLiter(s) IV Bolus once  methylPREDNISolone sodium succinate Injectable 60 milliGRAM(s) IV Push every 8 hours  metoprolol succinate ER 50 milliGRAM(s) Oral daily  pantoprazole    Tablet 40 milliGRAM(s) Oral before breakfast    MEDICATIONS  (PRN):  acetaminophen     Tablet .. 650 milliGRAM(s) Oral every 6 hours PRN Temp greater or equal to 38C (100.4F), Mild Pain (1 - 3)  meclizine 25 milliGRAM(s) Oral every 8 hours PRN Dizziness

## 2022-09-23 NOTE — PATIENT PROFILE ADULT - FALL HARM RISK - HARM RISK INTERVENTIONS

## 2022-09-23 NOTE — CONSULT NOTE ADULT - ASSESSMENT
Imp:   -acute hypoxic hypercapnic respiratory failure  -left pleural effusion s/p Pleurex catheter placement   -?COPD, currently on BiPAP    Plan:   -will order ensure in addition to soft diet   -calorie count to further quantify PO intake   -if patient is unable to be off BiPAP long enough to tolerate ensures, would consider placing nasoduodenal tube and starting tube feeds  -will leave recommendations for tube feeding if nasoduodenal tube is placed  Imp:   -acute hypoxic hypercapnic respiratory failure  -left pleural effusion s/p Pleurex catheter placement   -?COPD, currently on BiPAP    Plan:   -will order Ensure in addition to soft diet   -calorie count to further quantify PO intake   -if patient is unable to be off BiPAP long enough to tolerate ensures, would consider placing nasoduodenal tube and starting tube feeds  -will leave recommendations for tube feeding if nasoduodenal tube is placed

## 2022-09-23 NOTE — CONSULT NOTE ADULT - ASSESSMENT
IMPRESSION:    Acute on chronic hypoxic respiratory failure on BiPAP  Acute on chronic hypercapnic respiratory failure  HO MARILEE NSCLC with MPE s/p pleurodesis 8/22  Large left MPE  Possible postobstructive PNA  HO paroxysmal afib  HO COPD not in acute exacerbation    PLAN:    CNS: Avoid CNS depressants.    HEENT: Oral care    PULMONARY:  HOB @ 45 degrees. Encourage NIV during sleep. RVP and COVID negative. Finish 5 days course of ceftriaxone and azithromycin    CARDIOVASCULAR: Hold lasix. C/w rate control. Metoprolol IV PRN for HR > 120    GI: GI prophylaxis.  Feeding off NIV.    RENAL:  Follow up lytes.  Correct as needed    INFECTIOUS DISEASE: Follow up cultures. Finish course of abx.    HEMATOLOGICAL:  DVT prophylaxis.    ENDOCRINE:  Follow up FS.  Insulin protocol if needed.    MUSCULOSKELETAL: Bed rest    DGT SDU after procedure       IMPRESSION:    Acute on chronic hypoxic respiratory failure on BiPAP  Chronic hypercapnic respiratory failure  HO MARILEE NSCLC with MPE s/p pleurodesis 8/22  LArge left pleural effusion was scheduled for IPC today   HO paroxysmal afib.  NOw RVR   HO COPD not in acute exacerbation    PLAN:    CNS: Avoid CNS depressants.    HEENT: Oral care    PULMONARY:  HOB @ 45 degrees. Encourage NIV during sleep. RVP and COVID negative. IPC today     CARDIOVASCULAR: Hold lasix. C/w rate control. Avoid overload     GI: GI prophylaxis.  Feeding when off NIV.    RENAL:  Follow up lytes.  Correct as needed    INFECTIOUS DISEASE: Follow up cultures. Procal.      HEMATOLOGICAL:  DVT prophylaxis.  Eliquis oin hold for IPC.  TO restart in am post procedure     ENDOCRINE:  Follow up FS.  Insulin protocol if needed.    MUSCULOSKELETAL: Bed rest    ICU for now     DW Son     GO

## 2022-09-23 NOTE — CONSULT NOTE ADULT - ATTENDING COMMENTS
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  NOw RVR   HO COPD not in acute exacerbation    Plan as outlined above

## 2022-09-23 NOTE — CHART NOTE - NSCHARTNOTEFT_GEN_A_CORE
Transfer Note    Transfer from:     Transfer to: (  ) Medicine    (  ) Telemetry     (  ) RCU       ( x ) CEU    (  ) VENT                        (  ) Palliative    (  ) Stroke Unit    (  ) MICU    (  ) CCU    Signout given to:     ----------------------------------------------------------------------------------------------------------  HPI / ICU COURSE:    HPI:  Location: [facility] 016 [id number] ([facility])  Patient Name: KEIKO SALDAÑA  Age: 87y  Gender: Male      History of Present Illness  Mr. Saldaña is an 87 year old (ex-smoker) male patient known to have:  - Baseline AOx3; lives with son  - History of MARILEE Non Small Cell Lung Cancer and Large Pleural Effusion s/p Pleurodesis 08/26/2022. On Home O2 (3LPM NC). Follows with Dr Fletcher  - Right Carotid Artery Stenosis s/p CEA in 2016  - Peripheral Artery Disease  - Hypertension  - Paroxysmal Atrial Fibrillation. Forgot name of cardiologist. On eliquis 5mg BID  - Vertigo  - History of cataract surgery  - History of umbilical hernia repair      He was brought to the ED on 09/22 for evaluation of worsening SOB.  History goes back to few days PTP when the patient started complaining of worsening SOB on minimal exertion and at rest on 3LPM home O2.  This has been associated with leg swelling and orthopnea in the absence of new cough, runny nose, sore throat, chest pain, palpitations, diaphoresis, or light headedness.      On review of systems, patient denies any recent fever, chills, night sweats, urinary symptoms (urinary frequency, urgency, intermittence, dysuria, foul smelling urine, cloudy urine), change in bowel movements (diarrhea or constipation), abdominal pain, headache, nausea, or vomiting.   No sick contacts.   No recent travel or exposure to recent travelers.      Upon presentation to the ED, the patient's vitals:  - /57mmHg  - -> 140 bpm; confirmed afib with RVR s/p IV Cardizem 10mg x 3 times then initiation of cardizem drip with improvement in HR to 101 bpm  - T 36.6 degrees  - SaO2 97% on 4LPM NC -> placed on BiPAP 16/8 FiO2 40% later  - RR 26 bpm  - Blood Gas Profile - Venous (09.22.22 @ 16:35)    pH, Venous: 7.42    pCO2, Venous: 93 mmHg    pO2, Venous: 21 mmHg    HCO3, Venous: 60 mmol/L    Base Excess, Venous: 29.9 mmol/L    Oxygen Saturation, Venous: 24.4 %      Investigations   Laboratory Workup  - CBC:                        12.8   9.60  )-----------( 243      ( 22 Sep 2022 16:30 )             40.6     - Chemistry:  09-22    137  |  82<L>  |  19  ----------------------------<  100<H>  3.8   |  49<HH>  |  0.7    Ca    8.7      22 Sep 2022 16:30    TPro  6.6  /  Alb  2.9<L>  /  TBili  0.8  /  DBili  x   /  AST  33  /  ALT  27  /  AlkPhos  94  09-22      CARDIAC MARKERS ( 22 Sep 2022 16:30 )  x     / 0.02 ng/mL / x     / x     / x          Microbiological Workup  * COVID negative  * Influenza A and B negative      Radiological Workup  * CT Angio Chest PE Protocol w/ IV Cont (09.23.22 @ 02:07) No evidence of pulmonary embolus Large left pleural effusion with consolidative volume loss of the left lung. This is associated with prevascular suboptimally imaged mass which contributes to narrowing and partial opacification of the left mainstem  bronchus. No definite lymphadenopathy by size criteria. Consolidative process in the right lower lobe may be secondary to  atelectasis as well as an infectious process.      - Patient had a mechanical fall in ED per sign out (no details provided and patient thinks it was mechanical; no light headedness or blacking out; unsure about HT; no LOC or seizure-like activity; no confusion after fall)  - Patient was placed on BiPAP 16/8 FiO2 40% in ED  - For atrial fibrillation with RVR, patient was given IV Cardizem 10mg x3 doses followed by initiation of Cardizem drip  - He received IV Rocephin 1g x1 and Azithromycin 500mg x1 doses   - He also received 1L LR bolus in ED  - He will be admitted to MICU for further investigations, monitoring, and management       (23 Sep 2022 03:45)      --------------------------------------------------------------------------------------------------------  PMH/PSH:  PAST MEDICAL & SURGICAL HISTORY:  Vertigo    HTN (hypertension)    HLD (hyperlipidemia)    History of CEA (carotid endarterectomy)    S/P cataract surgery    H/O umbilical hernia repair        -------------------------------------------------------------------------------------------------------  PHYSICAL EXAM:  General: NAD  HEENT: Atraumatic  Respiratory: CTAB  Cardiac: RRR  Abdomen: soft, non-tender, non-distended  Extremities: warm and well-perfused  Neuro: A+Ox4. No FND    Vital Signs Last 24 Hrs  T(C): 36.1 (23 Sep 2022 12:00), Max: 36.8 (22 Sep 2022 16:44)  T(F): 96.9 (23 Sep 2022 12:00), Max: 98.3 (22 Sep 2022 16:44)  HR: 110 (23 Sep 2022 13:00) (80 - 140)  BP: 84/45 (23 Sep 2022 13:00) (57/46 - 146/88)  BP(mean): 57 (23 Sep 2022 13:00) (51 - 103)  RR: 20 (23 Sep 2022 13:00) (12 - 27)  SpO2: 98% (23 Sep 2022 13:00) (85% - 100%)    Parameters below as of 23 Sep 2022 13:00  Patient On (Oxygen Delivery Method): nasal cannula  O2 Flow (L/min): 2      I&O's Summary    22 Sep 2022 07:01  -  23 Sep 2022 07:00  --------------------------------------------------------  IN: 810 mL / OUT: 0 mL / NET: 810 mL    23 Sep 2022 07:01  -  23 Sep 2022 14:33  --------------------------------------------------------  IN: 720 mL / OUT: 2220 mL / NET: -1500 mL        --------------------------------------------------------------------------------------------------------  LABS:   CARDIAC MARKERS ( 23 Sep 2022 07:40 )  x     / 0.03 ng/mL / x     / x     / x      CARDIAC MARKERS ( 22 Sep 2022 16:30 )  x     / 0.02 ng/mL / x     / x     / x                                  11.2   8.84  )-----------( 215      ( 23 Sep 2022 07:40 )             35.6       09-23    137  |  85<L>  |  17  ----------------------------<  116<H>  3.8   |  50<HH>  |  0.6<L>    Ca    8.4      23 Sep 2022 07:40  Phos  2.5     09-23  Mg     1.8     09-23    TPro  6.0  /  Alb  2.6<L>  /  TBili  0.6  /  DBili  x   /  AST  38  /  ALT  27  /  AlkPhos  85  09-23      PT/INR - ( 23 Sep 2022 06:15 )   PT: 12.70 sec;   INR: 1.11 ratio         PTT - ( 23 Sep 2022 06:15 )  PTT:44.6 sec            Specimen Source:   Method Type:   Gram Stain:   Culture Results:   Bacteria:       -------------------------------------------------------------------------------------------------  RADIOLOGY:      ---------------------------------------------------------------------------------------------------  ASSESSMENT & PLAN:     #Acute Hypoxic Hypercapnic Respiratory Failure  #Large Left Pleural Effusion and Trace Right Pleural Effusion with History of Pleurodesis on 08/26/2022   #History of MARILEE Non Small Cell Lung Cancer On Home O2 (3LPM NC)  #Suspected History of COPD in Setting of Smoking History  -s/p left pleural cath placed with 2L fluid drained  -serial CXRs  -Rocephin and Azithromycin per ID    #Atrial Fibrillation with RVR in Setting of History of Paroxysmal Atrial Fibrillation on Eliquis  #Elevated Troponin: NSTEMI Likely Due to Demand Ischemia Secondary to Afib with RVR  -Metoprolol IV PRN for HR > 120  -F/u Troponin    #Mechanical Fall in ED  -Wound Care reccs    DVT PPx: Heparin  Diet: soft food  GI: PPI    FOR FOLLOW UP:  [ ] Serial CXRs  f/u d-dimer, procalcitonin, ferritin, LDH, ESR, and CRP; f/u urine legionella and streptococcus; f/u blood cultures x2; ordered sputum culture; Tylenol for fever); Check MRSA  [ ] Wound Care  [ ] 4pm CBC  [ ] Trend BUN, Cr and lytes,

## 2022-09-23 NOTE — H&P ADULT - ASSESSMENT
IMPRESSION:  Acute Hypoxic Hypercapnic Respiratory Failure  Large Left Pleural Effusion and Trace Right Pleural Effusion with History of Pleurodesis on 08/26/2022   History of MARILEE Non Small Cell Lung Cancer On Home O2 (3LPM NC)  Suspected History of COPD in Setting of Smoking History  Atrial Fibrillation with RVR in Setting of History of Paroxysmal Atrial Fibrillation on Eliquis  Elevated Troponin: NSTEMI Likely Due to Demand Ischemia Secondary to Afib with RVR  Mechanical Fall in ED  History of Right Carotid Artery Stenosis s/p CEA in 2016, Peripheral Artery Disease, Hypertension, and Vertigo      PLAN:    CNS: Will perform CT Head without contrast in setting of fall in ED; Avoid sedation    HEENT: Oral Care    PULMONARY: Pulmonary team on board for pleurodesis on 09/23 (will hold eliquis and Aspirin; will check T/S and INR); HOB 45; Aspiration precautions; Was switched from NC 4LPM to BiPAP 16/8 FiO2 40%; Consider checking ABG in AM to follow up pCO2 93 on prior VBG; Start IV Rocephin and Azithromycin; Start IV Solumedrol 60mg Q8h; Duonebs; Daily chest X ray; f/u d-dimer, procalcitonin, ferritin, LDH, ESR, and CRP; f/u Urine legionella and streptococcus    CARDIOVASCULAR: Consider consulting cardiology in setting of afib with RVR; Continue Cardizem drip for now (s/p IV Cardizem 10mg x3 doses); Hold home Toprol 50mg QD; Hold eliquis and consider starting AC after pleurodesis (will not start IV heparin now since pleurodesis will not be feasible this morning and will not start Lovenox therapeutic since will have to delay procedure by 12 hours); Check TTE; pro BNP 4131 noted; trend troponin; Start IV Lasix 40mg QD; Strict I/Os; Keep euvolemic;    GI: GI prophylaxis; NPO for now    RENAL: Trend BUN, Cr and lytes, replete as necessary; Will start IV Lasix 40mg QD    INFECTIOUS DISEASE: Start IV Rocephin and Azithromycin; Daily chest X ray; f/u d-dimer, procalcitonin, ferritin, LDH, ESR, and CRP; f/u urine legionella and streptococcus; f/u blood cultures x2; ordered sputum culture; Tylenol for fever); Check MRSA    HEMATOLOGICAL: SCDs for DVT prophylaxis if VA duplex with no DVT; Hold eliquis and consider starting AC after pleurodesis (will not start IV heparin now since pleurodesis will not be feasible this morning and will not start Lovenox therapeutic since will have to delay procedure by 12 hours); Trend Hb; Active Type and Screen    ENDOCRINE: Check TSH and Hba1c; Follow up FS; Start Sliding scale if needed    MUSCULOSKELETAL: Bedrest; Will need PT/OT and  for safe discharge plan once more stable        Full code  Poor prognosis overall   IMPRESSION:  Acute Hypoxic Hypercapnic Respiratory Failure  Large Left Pleural Effusion and Trace Right Pleural Effusion with History of Pleurodesis on 08/26/2022   History of MARILEE Non Small Cell Lung Cancer On Home O2 (3LPM NC)  Suspected History of COPD in Setting of Smoking History  Atrial Fibrillation with RVR in Setting of History of Paroxysmal Atrial Fibrillation on Eliquis  Elevated Troponin: NSTEMI Likely Due to Demand Ischemia Secondary to Afib with RVR  Mechanical Fall in ED  History of Right Carotid Artery Stenosis s/p CEA in 2016, Peripheral Artery Disease, Hypertension, and Vertigo      PLAN:    CNS: Will perform CT Head without contrast in setting of fall in ED; Avoid sedation; Resume home meclizine 25mg TID PRN for vertigo    HEENT: Oral Care    PULMONARY: Pulmonary team on board for pleurodesis on 09/23 (will hold eliquis and Aspirin; will check T/S and INR); HOB 45; Aspiration precautions; Was switched from NC 4LPM to BiPAP 16/8 FiO2 40%; Consider checking ABG in AM to follow up pCO2 93 on prior VBG; Start IV Rocephin and Azithromycin; Start IV Solumedrol 60mg Q8h; Duonebs; Daily chest X ray; f/u d-dimer, procalcitonin, ferritin, LDH, ESR, and CRP; f/u Urine legionella and streptococcus    CARDIOVASCULAR: Consider consulting cardiology in setting of afib with RVR; Continue Cardizem drip for now (s/p IV Cardizem 10mg x3 doses); Hold home Toprol 50mg QD and resume home digoxin 125mcg QD; Hold eliquis and consider starting AC after pleurodesis (will not start IV heparin now since pleurodesis will not be feasible this morning and will not start Lovenox therapeutic since will have to delay procedure by 12 hours); Check TTE; pro BNP 4131 noted; trend troponin; Start IV Lasix 40mg QD; Strict I/Os; Keep euvolemic; Resume home Atorvastatin 80mg QD; Hold Aspirin 81mg QD     GI: GI prophylaxis; NPO for now    RENAL: Trend BUN, Cr and lytes, replete as necessary; Will start IV Lasix 40mg QD    INFECTIOUS DISEASE: Start IV Rocephin and Azithromycin; Daily chest X ray; f/u d-dimer, procalcitonin, ferritin, LDH, ESR, and CRP; f/u urine legionella and streptococcus; f/u blood cultures x2; ordered sputum culture; Tylenol for fever); Check MRSA    HEMATOLOGICAL: SCDs for DVT prophylaxis if VA duplex with no DVT; Hold eliquis and consider starting AC after pleurodesis (will not start IV heparin now since pleurodesis will not be feasible this morning and will not start Lovenox therapeutic since will have to delay procedure by 12 hours); Trend Hb; Active Type and Screen    ENDOCRINE: Check TSH and Hba1c; Follow up FS; Start Sliding scale if needed    MUSCULOSKELETAL: Bedrest; Will need PT/OT and  for safe discharge plan once more stable        Full code  Poor prognosis overall

## 2022-09-23 NOTE — CONSULT NOTE ADULT - SUBJECTIVE AND OBJECTIVE BOX
NUTRITION SUPPORT TEAM  -  CONSULT NOTE     ADMISSION HPI:  Mr. Escobedo is an 87 year old (ex-smoker) male patient known to have:  - Baseline AOx3; lives with son  - History of MARILEE Non Small Cell Lung Cancer and Large Pleural Effusion s/p Pleurodesis 08/26/2022. On Home O2 (3LPM NC). Follows with Dr Fletcher  - Right Carotid Artery Stenosis s/p CEA in 2016  - Peripheral Artery Disease  - Hypertension  - Paroxysmal Atrial Fibrillation. Forgot name of cardiologist. On eliquis 5mg BID  - Vertigo  - History of cataract surgery  - History of umbilical hernia repair    He was brought to the ED on 09/22 for evaluation of worsening SOB.  History goes back to few days PTP when the patient started complaining of worsening SOB on minimal exertion and at rest on 3LPM home O2.  This has been associated with leg swelling and orthopnea in the absence of new cough, runny nose, sore throat, chest pain, palpitations, diaphoresis, or light headedness.  (23 Sep 2022 03:45)    NUTRITION SUPPORT NOTE:    Patient has been on BiPAP overnight. He was transitioned to nasal canula this morning, but became short of breath requiring BiPAP again. Patient reports feeling like he has been losing weight prior to admission but does not know how much weight he has lost.     REVIEW OF SYSTEMS:  Negative except as noted above.     PAST MEDICAL/SURGICAL HISTORY:   Vertigo  HTN (hypertension)  HLD (hyperlipidemia)  History of CEA (carotid endarterectomy)  S/P cataract surgery  H/O umbilical hernia repair    ALLERGIES:  No Known Allergies    VITALS:  T(F): 96.9 (09-23 @ 12:00), Max: 96.9 (09-23 @ 08:00)  HR: 110 (09-23 @ 13:00) (96 - 131)  BP: 84/45 (09-23 @ 13:00) (57/46 - 146/88)  RR: 20 (09-23 @ 13:00) (12 - 27)  SpO2: 98% (09-23 @ 13:00) (85% - 100%)    HEIGHT/WEIGHT/BMI:   Height (cm): 170.2 (09-23), 170.2 (08-26), 170.2 (06-20), 170.2 (06-14)  Weight (kg): 73.8 (09-23), 72.6 (08-26), 71.9 (06-20), 74.8 (06-14)  BMI (kg/m2): 25.5 (09-23), 25.1 (08-26), 24.8 (06-20), 25.8 (06-14)    PHYSICAL EXAM:   GENERAL: Short of breath, appears uncomfortable   HEENT: Moist mucous membranes, No lesions, on BiPAP  ABDOMEN: Soft, Nontender, Nondistended  EXTREMITIES:  No clubbing, cyanosis, +pitting bilateral lower extremity edema   SKIN: warm and well perfused; No obvious rashes or lesions    I/Os:   09-22-22 @ 07:01  -  09-23-22 @ 07:00  --------------------------------------------------------  IN:    Diltiazem: 10 mL    IV PiggyBack: 300 mL    Lactated Ringers Bolus: 500 mL  Total IN: 810 mL    OUT:    Oral Fluid: 0 mL  Total OUT: 0 mL    Total NET: 810 mL      STANDING MEDICATIONS:   albuterol/ipratropium for Nebulization 3 milliLiter(s) Nebulizer every 6 hours  atorvastatin 80 milliGRAM(s) Oral at bedtime  azithromycin  IVPB 500 milliGRAM(s) IV Intermittent every 24 hours  cefTRIAXone   IVPB 1000 milliGRAM(s) IV Intermittent every 24 hours  chlorhexidine 2% Cloths 1 Application(s) Topical <User Schedule>  digoxin     Tablet 125 MICROGram(s) Oral daily  furosemide   Injectable 40 milliGRAM(s) IV Push daily  methylPREDNISolone sodium succinate Injectable 60 milliGRAM(s) IV Push every 8 hours  metoprolol succinate ER 50 milliGRAM(s) Oral daily  pantoprazole    Tablet 40 milliGRAM(s) Oral before breakfast      LABS:                         11.2   8.84  )-----------( 215      ( 23 Sep 2022 07:40 )             35.6     137  |  85<L>  |  17  ----------------------------<  116<H>          (09-23-22 @ 07:40)  3.8   |  50<HH>  |  0.6<L>    Ca    8.4          (09-23-22 @ 07:40)  Phos  2.5         (09-23-22 @ 07:40)  Mg     1.8         (09-23-22 @ 07:40)    TPro  6.0  /  Alb  2.6<L>  /  TBili  0.6  /  DBili  x   /  AST  38  /  ALT  27  /  AlkPhos  85       09-23-22 @ 07:40      Triglycerides, Serum: 86 mg/dL (09-23 @ 07:40)   A1c: 5.6 % (09-23-22 @ 07:40)    Blood Glucose (Past 24 hours):  109 mg/dL (09-23 @ 06:01)  126 mg/dL (09-22 @ 20:16)  89 mg/dL (09-22 @ 16:28)    DIET:   Diet, Soft and Bite Sized (09-23-22 @ 12:21) [Active] NUTRITION SUPPORT TEAM  -  CONSULT NOTE     ADMISSION HPI:  Mr. Escobedo is an 87 year old (ex-smoker) male patient known to have:  - Baseline AOx3; lives with son  - History of MARILEE Non Small Cell Lung Cancer and Large Pleural Effusion s/p Pleurodesis 08/26/2022. On Home O2 (3LPM NC). Follows with Dr Fletcher  - Right Carotid Artery Stenosis s/p CEA in 2016  - Peripheral Artery Disease  - Hypertension  - Paroxysmal Atrial Fibrillation. Forgot name of cardiologist. On eliquis 5mg BID  - Vertigo  - History of cataract surgery  - History of umbilical hernia repair    He was brought to the ED on 09/22 for evaluation of worsening SOB.  History goes back to few days PTP when the patient started complaining of worsening SOB on minimal exertion and at rest on 3LPM home O2.  This has been associated with leg swelling and orthopnea in the absence of new cough, runny nose, sore throat, chest pain, palpitations, diaphoresis, or light headedness.  (23 Sep 2022 03:45)    NUTRITION SUPPORT NOTE:    Patient has been on BiPAP overnight. He was transitioned to nasal canula this morning, but became short of breath requiring BiPAP again. Patient reports feeling like he has been losing weight prior to admission but does not know how much weight he has lost.     REVIEW OF SYSTEMS:  Negative except as noted above.     PAST MEDICAL/SURGICAL HISTORY:   Vertigo  HTN (hypertension)  HLD (hyperlipidemia)  History of CEA (carotid endarterectomy)  S/P cataract surgery  H/O umbilical hernia repair    ALLERGIES:  No Known Allergies    VITALS:  T(F): 96.9 (09-23 @ 12:00), Max: 96.9 (09-23 @ 08:00)  HR: 110 (09-23 @ 13:00) (96 - 131)  BP: 84/45 (09-23 @ 13:00) (57/46 - 146/88)  RR: 20 (09-23 @ 13:00) (12 - 27)  SpO2: 98% (09-23 @ 13:00) (85% - 100%)    HEIGHT/WEIGHT/BMI:   Height (cm): 170.2 (09-23), 170.2 (08-26), 170.2 (06-20), 170.2 (06-14)  Weight (kg): 73.8 (09-23), 72.6 (08-26), 71.9 (06-20), 74.8 (06-14)  BMI (kg/m2): 25.5 (09-23), 25.1 (08-26), 24.8 (06-20), 25.8 (06-14)    PHYSICAL EXAM:   GENERAL: Short of breath, appears uncomfortable   HEENT: Moist mucous membranes, No lesions, on BiPAP  ABDOMEN: Soft, Nontender, Nondistended  EXTREMITIES:  No clubbing, cyanosis, +pitting bilateral lower extremity edema   SKIN: warm and well perfused; No obvious rashes or lesions    I/Os:   09-22-22 @ 07:01  -  09-23-22 @ 07:00  --------------------------------------------------------  IN:    Diltiazem: 10 mL    IV PiggyBack: 300 mL    Lactated Ringers Bolus: 500 mL  Total IN: 810 mL  OUT:    Oral Fluid: 0 mL  Total OUT: 0 mL  Total NET: 810 mL    STANDING MEDICATIONS:   albuterol/ipratropium for Nebulization 3 milliLiter(s) Nebulizer every 6 hours  atorvastatin 80 milliGRAM(s) Oral at bedtime  azithromycin  IVPB 500 milliGRAM(s) IV Intermittent every 24 hours  cefTRIAXone   IVPB 1000 milliGRAM(s) IV Intermittent every 24 hours  chlorhexidine 2% Cloths 1 Application(s) Topical <User Schedule>  digoxin     Tablet 125 MICROGram(s) Oral daily  furosemide   Injectable 40 milliGRAM(s) IV Push daily  methylPREDNISolone sodium succinate Injectable 60 milliGRAM(s) IV Push every 8 hours  metoprolol succinate ER 50 milliGRAM(s) Oral daily  pantoprazole    Tablet 40 milliGRAM(s) Oral before breakfast      LABS:                         11.2   8.84  )-----------( 215      ( 23 Sep 2022 07:40 )             35.6     137  |  85<L>  |  17  ----------------------------<  116<H>          (09-23-22 @ 07:40)  3.8   |  50<HH>  |  0.6<L>    Ca    8.4          (09-23-22 @ 07:40)  Phos  2.5         (09-23-22 @ 07:40)  Mg     1.8         (09-23-22 @ 07:40)    TPro  6.0  /  Alb  2.6<L>  /  TBili  0.6  /  DBili  x   /  AST  38  /  ALT  27  /  AlkPhos  85       09-23-22 @ 07:40      Triglycerides, Serum: 86 mg/dL (09-23 @ 07:40)   A1c: 5.6 % (09-23-22 @ 07:40)    Blood Glucose (Past 24 hours):  109 mg/dL (09-23 @ 06:01)  126 mg/dL (09-22 @ 20:16)  89 mg/dL (09-22 @ 16:28)    DIET:   Diet, Soft and Bite Sized (09-23-22 @ 12:21) [Active]

## 2022-09-24 NOTE — PROGRESS NOTE ADULT - SUBJECTIVE AND OBJECTIVE BOX
KEIKO SALDAÑA  87y Male    CHIEF COMPLAINT:    Patient is a 87y old  Male who presents with a chief complaint of Hypoxic Respiratory Failure (24 Sep 2022 09:09)    INTERVAL HPI/OVERNIGHT EVENTS:    Patient seen and examined. s/p rapid resonse overnight for hypoxia/hypotension, placed on NIV and pleurx drained 1L     ROS: All other systems are negative.    Vital Signs:    T(F): 96.8 (22 @ 11:34), Max: 98.1 (22 @ 05:00)  HR: 103 (22 @ 11:34) (103 - 136)  BP: 101/82 (22 @ 11:34) (76/53 - 121/69)  RR: 20 (22 @ 11:34) (18 - 22)  SpO2: 98% (22 @ 11:34) (86% - 99%)    23 Sep 2022 07:01  -  24 Sep 2022 07:00  --------------------------------------------------------  IN: 1580 mL / OUT: 2570 mL / NET: -990 mL       Daily Weight in k.4 (24 Sep 2022 11:34)    POCT Blood Glucose.: 161 mg/dL (23 Sep 2022 20:54)    PHYSICAL EXAM:    GENERAL:  NAD on bipap   SKIN: No rashes or lesions  HEENT: Atraumatic. Normocephalic.  NECK: Supple, No JVD.  PULMONARY: absent breath sounds L. No wheezing   CVS: Normal S1, S2. Rate and Rhythm are regular   ABDOMEN/GI: Soft, Nontender, Nondistended   MSK:  No clubbing or cyanosis   NEUROLOGIC: Moves all extremities   PSYCH: Awake, confused, answers simple questions     Consultant(s) Notes Reviewed:  [x ] YES  [ ] NO  Care Discussed with Consultants/Other Providers [ x] YES  [ ] NO    LABS:                        10.5   10. )-----------( 233      ( 23 Sep 2022 22:33 )             33.1     137  |  86<L>  |  20  ----------------------------<  142<H>  3.8   |  47<HH>  |  0.8    Ca    8.4      23 Sep 2022 22:33  Phos  2.5       Mg     1.9         TPro  5.8<L>  /  Alb  2.5<L>  /  TBili  0.5  /  DBili  x   /  AST  49<H>  /  ALT  33  /  AlkPhos  83      PT/INR - ( 23 Sep 2022 06:15 )   PT: 12.70 sec;   INR: 1.11 ratio       PTT - ( 23 Sep 2022 06:15 )  PTT:44.6 sec  Serum Pro-Brain Natriuretic Peptide: 4131 pg/mL (22 @ 16:30)    Trop 0.03, CKMB --, CK --, 22 @ 22:33  Trop 0.03, CKMB --, CK --, 22 @ 07:40  Trop 0.02, CKMB --, CK --, 22 @ 16:30    RADIOLOGY & ADDITIONAL TESTS:  Imaging or report Personally Reviewed:  [x] YES  [ ] NO  EKG reviewed: [x] YES  [ ] NO    Medications:  Standing  albuterol/ipratropium for Nebulization 3 milliLiter(s) Nebulizer every 6 hours  atorvastatin 80 milliGRAM(s) Oral at bedtime  azithromycin  IVPB 500 milliGRAM(s) IV Intermittent every 24 hours  cefTRIAXone   IVPB 1000 milliGRAM(s) IV Intermittent every 24 hours  chlorhexidine 2% Cloths 1 Application(s) Topical <User Schedule>  digoxin     Tablet 125 MICROGram(s) Oral daily  enoxaparin Injectable 70 milliGRAM(s) SubCutaneous every 12 hours  methylPREDNISolone sodium succinate Injectable 60 milliGRAM(s) IV Push every 8 hours  metoprolol tartrate 50 milliGRAM(s) Oral two times a day  norepinephrine Infusion 0.03 MICROgram(s)/kG/Min IV Continuous <Continuous>  pantoprazole    Tablet 40 milliGRAM(s) Oral before breakfast    PRN Meds  acetaminophen     Tablet .. 650 milliGRAM(s) Oral every 6 hours PRN  meclizine 25 milliGRAM(s) Oral every 8 hours PRN

## 2022-09-24 NOTE — GOALS OF CARE CONVERSATION - ADVANCED CARE PLANNING - CONVERSATION DETAILS
Goals of care were discussed with patient at bedside.  He was made aware of patient's current condition and prognosis.  He was also made aware of the possibility of acute deterioration at anytime during patient's stay.  I informed him about different aspects of goals of care.  Difference between being DNR/DNI and Full Code was addressed.  Patient needed time to think and ask family about GOC.
RAJWINDER discussed with son Jack at bedside. The patient has expressed to him that he does not want any aggressive interventions/heroic life sustaining tx. DNR/DNI confirmed, RICHARD signed  Son open to palliative team meeting

## 2022-09-24 NOTE — PROGRESS NOTE ADULT - SUBJECTIVE AND OBJECTIVE BOX
Patient is a 87y old  Male who presents with a chief complaint of Hypoxic Respiratory Failure (23 Sep 2022 15:05)        Over Night Events:    Events noted.      ROS:     All ROS are negative except HPI         PHYSICAL EXAM    ICU Vital Signs Last 24 Hrs  T(C): 36.6 (24 Sep 2022 07:14), Max: 36.7 (24 Sep 2022 05:00)  T(F): 97.8 (24 Sep 2022 07:14), Max: 98.1 (24 Sep 2022 05:00)  HR: 103 (24 Sep 2022 07:14) (103 - 136)  BP: 115/80 (24 Sep 2022 07:14) (76/53 - 121/69)  BP(mean): 92 (24 Sep 2022 07:14) (59 - 92)  ABP: --  ABP(mean): --  RR: 20 (24 Sep 2022 07:14) (12 - 25)  SpO2: 97% (24 Sep 2022 08:47) (86% - 100%)    O2 Parameters below as of 24 Sep 2022 07:14  Patient On (Oxygen Delivery Method): BiPAP/CPAP            CONSTITUTIONAL:  Well nourished.  NAD    ENT:   Airway patent,   Mouth with normal mucosa.   No thrush    EYES:   Pupils equal,   Round and reactive to light.    CARDIAC:   Normal rate,   Regular rhythm.    No edema      Vascular:  Normal systolic impulse  No Carotid bruits    RESPIRATORY:   No wheezing  Bilateral BS  Normal chest expansion  Not tachypneic,  No use of accessory muscles    GASTROINTESTINAL:  Abdomen soft,   Non-tender,   No guarding,   + BS    MUSCULOSKELETAL:   Range of motion is not limited,  No clubbing, cyanosis    NEUROLOGICAL:   Alert and oriented   No motor  deficits.    SKIN:   Skin normal color for race,   Warm and dry and intact.   No evidence of rash.    PSYCHIATRIC:   Normal mood and affect.   No apparent risk to self or others.    HEMATOLOGICAL:  No cervical  lymphadenopathy.  no inguinal lymphadenopathy      09-23-22 @ 07:01  -  09-24-22 @ 07:00  --------------------------------------------------------  IN:    Lactated Ringers Bolus: 1000 mL    Oral Fluid: 330 mL    Sodium Chloride 0.9% Bolus: 250 mL  Total IN: 1580 mL    OUT:    Norepinephrine: 0 mL    Other (mL): 2000 mL    Voided (mL): 570 mL  Total OUT: 2570 mL    Total NET: -990 mL          LABS:                            10.5   10.22 )-----------( 233      ( 23 Sep 2022 22:33 )             33.1                                               09-23    137  |  86<L>  |  20  ----------------------------<  142<H>  3.8   |  47<HH>  |  0.8    Ca    8.4      23 Sep 2022 22:33  Phos  2.5     09-23  Mg     1.9     09-23    TPro  5.8<L>  /  Alb  2.5<L>  /  TBili  0.5  /  DBili  x   /  AST  49<H>  /  ALT  33  /  AlkPhos  83  09-23      PT/INR - ( 23 Sep 2022 06:15 )   PT: 12.70 sec;   INR: 1.11 ratio         PTT - ( 23 Sep 2022 06:15 )  PTT:44.6 sec                                           CARDIAC MARKERS ( 23 Sep 2022 22:33 )  x     / 0.03 ng/mL / x     / x     / x      CARDIAC MARKERS ( 23 Sep 2022 07:40 )  x     / 0.03 ng/mL / x     / x     / x      CARDIAC MARKERS ( 22 Sep 2022 16:30 )  x     / 0.02 ng/mL / x     / x     / x                                                LIVER FUNCTIONS - ( 23 Sep 2022 22:33 )  Alb: 2.5 g/dL / Pro: 5.8 g/dL / ALK PHOS: 83 U/L / ALT: 33 U/L / AST: 49 U/L / GGT: x                                                                                                                                   ABG - ( 23 Sep 2022 20:55 )  pH, Arterial: 7.32  pH, Blood: x     /  pCO2: 94    /  pO2: 292   / HCO3: 48    / Base Excess: 18.4  /  SaO2: 100.0               MEDICATIONS  (STANDING):  albuterol/ipratropium for Nebulization 3 milliLiter(s) Nebulizer every 6 hours  atorvastatin 80 milliGRAM(s) Oral at bedtime  azithromycin  IVPB 500 milliGRAM(s) IV Intermittent every 24 hours  cefTRIAXone   IVPB 1000 milliGRAM(s) IV Intermittent every 24 hours  chlorhexidine 2% Cloths 1 Application(s) Topical <User Schedule>  digoxin     Tablet 125 MICROGram(s) Oral daily  furosemide   Injectable 40 milliGRAM(s) IV Push daily  methylPREDNISolone sodium succinate Injectable 60 milliGRAM(s) IV Push every 8 hours  metoprolol succinate ER 50 milliGRAM(s) Oral daily  norepinephrine Infusion 0.03 MICROgram(s)/kG/Min (4.15 mL/Hr) IV Continuous <Continuous>  pantoprazole    Tablet 40 milliGRAM(s) Oral before breakfast    MEDICATIONS  (PRN):  acetaminophen     Tablet .. 650 milliGRAM(s) Oral every 6 hours PRN Temp greater or equal to 38C (100.4F), Mild Pain (1 - 3)  meclizine 25 milliGRAM(s) Oral every 8 hours PRN Dizziness      New X-rays reviewed:                                                                                  ECHO   Patient is a 87y old  Male who presents with a chief complaint of Hypoxic Respiratory Failure (23 Sep 2022 15:05)        Over Night Events:    Events noted. RRT called yesterday for altered MS, desaturation, on BIPAP      PHYSICAL EXAM    ICU Vital Signs Last 24 Hrs  T(C): 36.6 (24 Sep 2022 07:14), Max: 36.7 (24 Sep 2022 05:00)  T(F): 97.8 (24 Sep 2022 07:14), Max: 98.1 (24 Sep 2022 05:00)  HR: 103 (24 Sep 2022 07:14) (103 - 136)  BP: 115/80 (24 Sep 2022 07:14) (76/53 - 121/69)  BP(mean): 92 (24 Sep 2022 07:14) (59 - 92)  RR: 20 (24 Sep 2022 07:14) (12 - 25)  SpO2: 97% (24 Sep 2022 08:47) (86% - 100%)    O2 Parameters below as of 24 Sep 2022 07:14  Patient On (Oxygen Delivery Method): BiPAP/CPAP            CONSTITUTIONAL:  ILL looking, cachectic    ENT:   Airway patent,   Mouth with normal mucosa.   No thrush    CARDIAC:   Irregualar    RESPIRATORY:   dec bs l side    GASTROINTESTINAL:  Abdomen soft,   Non-tender,   No guarding,   + BS    MUSCULOSKELETAL:   Range of motion is not limited,  No clubbing, cyanosis    NEUROLOGICAL:   Alert and oriented   No motor  deficits.    09-23-22 @ 07:01  -  09-24-22 @ 07:00  --------------------------------------------------------  IN:    Lactated Ringers Bolus: 1000 mL    Oral Fluid: 330 mL    Sodium Chloride 0.9% Bolus: 250 mL  Total IN: 1580 mL    OUT:    Norepinephrine: 0 mL    Other (mL): 2000 mL    Voided (mL): 570 mL  Total OUT: 2570 mL    Total NET: -990 mL          LABS:                            10.5   10.22 )-----------( 233      ( 23 Sep 2022 22:33 )             33.1                                               09-23    137  |  86<L>  |  20  ----------------------------<  142<H>  3.8   |  47<HH>  |  0.8    Ca    8.4      23 Sep 2022 22:33  Phos  2.5     09-23  Mg     1.9     09-23    TPro  5.8<L>  /  Alb  2.5<L>  /  TBili  0.5  /  DBili  x   /  AST  49<H>  /  ALT  33  /  AlkPhos  83  09-23      PT/INR - ( 23 Sep 2022 06:15 )   PT: 12.70 sec;   INR: 1.11 ratio         PTT - ( 23 Sep 2022 06:15 )  PTT:44.6 sec                                           CARDIAC MARKERS ( 23 Sep 2022 22:33 )  x     / 0.03 ng/mL / x     / x     / x      CARDIAC MARKERS ( 23 Sep 2022 07:40 )  x     / 0.03 ng/mL / x     / x     / x      CARDIAC MARKERS ( 22 Sep 2022 16:30 )  x     / 0.02 ng/mL / x     / x     / x                                                LIVER FUNCTIONS - ( 23 Sep 2022 22:33 )  Alb: 2.5 g/dL / Pro: 5.8 g/dL / ALK PHOS: 83 U/L / ALT: 33 U/L / AST: 49 U/L / GGT: x                                                                                                                                   ABG - ( 23 Sep 2022 20:55 )  pH, Arterial: 7.32  pH, Blood: x     /  pCO2: 94    /  pO2: 292   / HCO3: 48    / Base Excess: 18.4  /  SaO2: 100.0               MEDICATIONS  (STANDING):  albuterol/ipratropium for Nebulization 3 milliLiter(s) Nebulizer every 6 hours  atorvastatin 80 milliGRAM(s) Oral at bedtime  azithromycin  IVPB 500 milliGRAM(s) IV Intermittent every 24 hours  cefTRIAXone   IVPB 1000 milliGRAM(s) IV Intermittent every 24 hours  chlorhexidine 2% Cloths 1 Application(s) Topical <User Schedule>  digoxin     Tablet 125 MICROGram(s) Oral daily  furosemide   Injectable 40 milliGRAM(s) IV Push daily  methylPREDNISolone sodium succinate Injectable 60 milliGRAM(s) IV Push every 8 hours  metoprolol succinate ER 50 milliGRAM(s) Oral daily  norepinephrine Infusion 0.03 MICROgram(s)/kG/Min (4.15 mL/Hr) IV Continuous <Continuous>  pantoprazole    Tablet 40 milliGRAM(s) Oral before breakfast    MEDICATIONS  (PRN):  acetaminophen     Tablet .. 650 milliGRAM(s) Oral every 6 hours PRN Temp greater or equal to 38C (100.4F), Mild Pain (1 - 3)  meclizine 25 milliGRAM(s) Oral every 8 hours PRN Dizziness      New X-rays reviewed:                                                                                  ECHO

## 2022-09-24 NOTE — PROGRESS NOTE ADULT - ASSESSMENT
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  Now RVR   HO COPD not in acute exacerbation    PLAN:    CNS: Avoid CNS depressants.    HEENT: Oral care    PULMONARY:  HOB @ 45 degrees. Encourage NIV 4 hr on off and during sleep. RVP and COVID negative. IPC 9/23 with 2000 + 1000 ml drainage on the same day. Repeat ABG    CARDIOVASCULAR: Hold lasix for hypotension. C/w rate control. Avoid overload. midodrine if hypotensive    GI: GI prophylaxis.  Feeding when off NIV.    RENAL:  Follow up lytes.  Correct as needed    INFECTIOUS DISEASE: Follow up cultures. Procal.      HEMATOLOGICAL:  DVT prophylaxis.  Switch Eliquis to Lovenox    ENDOCRINE:  Follow up FS.  Insulin protocol if needed.    MUSCULOSKELETAL: Bed rest    DW Son     [facility]

## 2022-09-24 NOTE — PROGRESS NOTE ADULT - ASSESSMENT
Mr. 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 (3LPM NC)  Suspected History of COPD in Setting of Smoking History  -ss/p pleurx placement 9/23 with 2l removed 9/23  - s/p rapid response 9/23 PM for hypoxia, hypotension and rapid Afib with RVR,placed on bipap, drained additional 1L from pleurx and given metoprolol with some improvement    -Rocephin and Azithromycin per ID  - check daily xrays, f/u procal and cx  - NIV 4 on and 4on and PRN. Bedside swallow eval once off bipap    PAF with RVR  Elevated Troponin: NSTEMI Likely Due to Demand Ischemia Secondary to Afib with RVR  - increase metoprolol to 50 Q12H. Can give IV metoprolol if NPO/on bipap  - switch eliquis to Lovenox while hospitalized in case of procedures    Mechanical Fall in ED  - per prior notes, no other details available   -Wound Care reccs    #Progress Note Handoff  Pending (specify):   clinical improvement, labs, tapering down supplemental 02, pleurx drainage PRN  Family discussion: Plan of care discussed with patient's son Jack at bedside, all questions answered  Disposition:  from home     Sosa Mccabe MD  s. [id number]

## 2022-09-25 NOTE — PROGRESS NOTE ADULT - ASSESSMENT
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  Now RVR   HO COPD not in acute exacerbation    PLAN:    CNS: Avoid CNS depressants.    HEENT: Oral care    PULMONARY:  HOB @ 45 degrees. Encourage NIV 4 hr on off and during sleep. RVP and COVID negative.    CARDIOVASCULAR: Hold lasix for hypotension. C/w rate control. Avoid overload. midodrine if hypotensive    GI: GI prophylaxis.  Feeding when off NIV.    RENAL:  Follow up lytes.  Correct as needed    INFECTIOUS DISEASE: Follow up cultures. Procal.     HEMATOLOGICAL:  DVT prophylaxis.  cw  Lovenox    ENDOCRINE:  Follow up FS.  Insulin protocol if needed.    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  Now RVR   HO COPD not in acute exacerbation    PLAN:    CNS: Avoid CNS depressants.    HEENT: Oral care    PULMONARY:  HOB @ 45 degrees. Encourage NIV 4 hr on off and during sleep. RVP and COVID negative.    CARDIOVASCULAR: Hold lasix for hypotension. C/w rate control. Avoid overload. midodrine if hypotensive    GI: GI prophylaxis.  Feeding when off NIV.    RENAL:  Follow up lytes.  Correct as needed    INFECTIOUS DISEASE: Follow up cultures. Procal.     HEMATOLOGICAL:  DVT prophylaxis.  cw  Lovenox    ENDOCRINE:  Follow up FS.  Insulin protocol if needed.    MUSCULOSKELETAL: Bed rest    GOC

## 2022-09-25 NOTE — PROGRESS NOTE ADULT - SUBJECTIVE AND OBJECTIVE BOX
ESVINSIMIMISSAEL BERUMENFARHAT  87y Male    CHIEF COMPLAINT:    Patient is a 87y old  Male who presents with a chief complaint of Hypoxic Respiratory Failure (25 Sep 2022 09:38)    INTERVAL HPI/OVERNIGHT EVENTS:    Patient seen and examined. No acute events overnight. Calmer today, tolerating NC     ROS: All other systems are negative.    Vital Signs:    T(F): 96.4 (22 @ 11:36), Max: 98.1 (22 @ 20:30)  HR: 125 (22 @ 11:36) (108 - 147)  BP: 95/75 (22 @ 11:36) (95/60 - 116/53)  RR: 20 (22 @ 11:36) (20 - 20)  SpO2: 100% (22 @ 11:36) (96% - 100%)    Daily Weight in k.1 (25 Sep 2022 08:37)    PHYSICAL EXAM:    GENERAL:  NAD  SKIN: No rashes or lesions  HEENT: Atraumatic. Normocephalic   NECK: Supple, No JVD.    PULMONARY: absent breath sounds L. No wheezing   CVS: Normal S1, S2. Rate and Rhythm are regular   ABDOMEN/GI: Soft, Nontender, Nondistended   MSK:  No clubbing or cyanosis   NEUROLOGIC: Moves all extremities   PSYCH: Awake, confused, answers simple questions     Consultant(s) Notes Reviewed:  [x ] YES  [ ] NO  Care Discussed with Consultants/Other Providers [ x] YES  [ ] NO    LABS:                        9.2    11.19 )-----------( 189      ( 25 Sep 2022 01:03 )             28.7     138  |  90<L>  |  35<H>  ----------------------------<  143<H>  4.2   |  44<HH>  |  1.0    Ca    8.3<L>      25 Sep 2022 01:03  Mg     2.0         TPro  5.0<L>  /  Alb  2.2<L>  /  TBili  0.4  /  DBili  x   /  AST  41  /  ALT  29  /  AlkPhos  75      Serum Pro-Brain Natriuretic Peptide: 4131 pg/mL (22 @ 16:30)    Trop 0.03, CKMB --, CK --, 22 @ 22:33  Trop 0.03, CKMB --, CK --, 22 @ 07:40  Trop 0.02, CKMB --, CK --, 22 @ 16:30    Culture - Blood (collected 23 Sep 2022 23:30)  Source: .Blood Blood-Peripheral  Preliminary Report (25 Sep 2022 07:02):    No growth to date.    Culture - Blood (collected 23 Sep 2022 07:40)  Source: .Blood Blood  Preliminary Report (24 Sep 2022 19:01):    No growth to date.    RADIOLOGY & ADDITIONAL TESTS:  Imaging or report Personally Reviewed:  [x] YES  [ ] NO  EKG reviewed: [x] YES  [ ] NO    Medications:  Standing  albuterol/ipratropium for Nebulization 3 milliLiter(s) Nebulizer every 6 hours  atorvastatin 80 milliGRAM(s) Oral at bedtime  chlorhexidine 2% Cloths 1 Application(s) Topical <User Schedule>  digoxin     Tablet 125 MICROGram(s) Oral daily  enoxaparin Injectable 70 milliGRAM(s) SubCutaneous every 12 hours  methylPREDNISolone sodium succinate Injectable 60 milliGRAM(s) IV Push every 8 hours  metoprolol tartrate 50 milliGRAM(s) Oral every 6 hours  norepinephrine Infusion 0.03 MICROgram(s)/kG/Min IV Continuous <Continuous>  pantoprazole    Tablet 40 milliGRAM(s) Oral before breakfast    PRN Meds  acetaminophen     Tablet .. 650 milliGRAM(s) Oral every 6 hours PRN  meclizine 25 milliGRAM(s) Oral every 8 hours PRN

## 2022-09-25 NOTE — PROGRESS NOTE ADULT - ASSESSMENT
Mr. 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 (3LPM NC)  Suspected History of COPD in Setting of Smoking History  -ss/p pleurx placement 9/23 with 2l removed 9/23  - s/p rapid response 9/23 PM for hypoxia, hypotension and rapid Afib with RVR,placed on bipap, drained additional 1L from pleurx and given metoprolol with some improvement    -Rocephin and Azithromycin per ID  - check daily xrays, f/u procal and cx  - NIV 4 on and 4on and PRN. Bedside swallow eval once off bipap    PAF with RVR  Elevated Troponin: NSTEMI Likely Due to Demand Ischemia Secondary to Afib with RVR  - increase metoprolol to 50 Q12H. Can give IV metoprolol if NPO/on bipap  - switch eliquis to Lovenox while hospitalized in case of procedures    Mechanical Fall in ED  - per prior notes, no other details available   -Wound Care reccs    #Progress Note Handoff  Pending (specify):   clinical improvement, labs, tapering down supplemental 02, pleurx drainage PRN  Family discussion: Plan of care discussed with patient's son Jack at bedside, all questions answered  Disposition:  from home

## 2022-09-25 NOTE — PROGRESS NOTE ADULT - SUBJECTIVE AND OBJECTIVE BOX
Patient is a 87y old  Male who presents with a chief complaint of Hypoxic Respiratory Failure (25 Sep 2022 01:15)        Over Night Events: On O2. ill appearing.        ROS:  See HPI    PHYSICAL EXAM    ICU Vital Signs Last 24 Hrs  T(C): 36.6 (25 Sep 2022 08:37), Max: 36.7 (24 Sep 2022 20:30)  T(F): 97.9 (25 Sep 2022 08:37), Max: 98.1 (24 Sep 2022 20:30)  HR: 108 (25 Sep 2022 08:37) (103 - 147)  BP: 115/58 (25 Sep 2022 08:37) (95/60 - 116/53)  BP(mean): 83 (25 Sep 2022 08:37) (71 - 89)  RR: 20 (25 Sep 2022 08:37) (20 - 20)  SpO2: 98% (25 Sep 2022 08:42) (96% - 100%)    O2 Parameters below as of 25 Sep 2022 08:42  Patient On (Oxygen Delivery Method): nasal cannula  O2 Flow (L/min): 2          CONSTITUTIONAL:  In  NAD    ENT:   Airway patent,   No thrush    EYES:   Clear bilaterally,   pupils equal,   round and reactive to light.    CARDIAC:   tachycardic  irregular rhythm.    no edema      CAROTID:   normal systolic impulse  no bruits    RESPIRATORY:   decreased left breath sounds  No wheezing  Normal chest expansion  Not tachypneic,  No use of accessory muscles    GASTROINTESTINAL:  Abdomen soft,   non-tender,   no guarding,   + BS    MUSCULOSKELETAL:   range of motion is not limited,  no clubbing, cyanosis    NEUROLOGICAL:   Alert  no motor deficits.        LABS:                            9.2    11.19 )-----------( 189      ( 25 Sep 2022 01:03 )             28.7                                               09-25    138  |  90<L>  |  35<H>  ----------------------------<  143<H>  4.2   |  44<HH>  |  1.0    Ca    8.3<L>      25 Sep 2022 01:03  Mg     2.0     09-25    TPro  5.0<L>  /  Alb  2.2<L>  /  TBili  0.4  /  DBili  x   /  AST  41  /  ALT  29  /  AlkPhos  75  09-25                                                 CARDIAC MARKERS ( 23 Sep 2022 22:33 )  x     / 0.03 ng/mL / x     / x     / x                                                LIVER FUNCTIONS - ( 25 Sep 2022 01:03 )  Alb: 2.2 g/dL / Pro: 5.0 g/dL / ALK PHOS: 75 U/L / ALT: 29 U/L / AST: 41 U/L / GGT: x                    Procalcitonin, Serum: 0.06 ng/mL (09-23-22 @ 07:40)  C-Reactive Protein, Serum: 90.8 mg/L (09-23-22 @ 07:40)  Ferritin, Serum: 627 ng/mL (09-23-22 @ 07:40)  D-Dimer Assay, Quantitative: 452 ng/mL DDU (09-23-22 @ 06:15)                                        Culture - Blood (collected 23 Sep 2022 23:30)  Source: .Blood Blood-Peripheral  Preliminary Report (25 Sep 2022 07:02):    No growth to date.    Culture - Blood (collected 23 Sep 2022 07:40)  Source: .Blood Blood  Preliminary Report (24 Sep 2022 19:01):    No growth to date.                                                                                       ABG - ( 24 Sep 2022 11:38 )  pH, Arterial: 7.54  pH, Blood: x     /  pCO2: 60    /  pO2: 86    / HCO3: 51    / Base Excess: 24.5  /  SaO2: 98.0          MEDICATIONS  (STANDING):  albuterol/ipratropium for Nebulization 3 milliLiter(s) Nebulizer every 6 hours  atorvastatin 80 milliGRAM(s) Oral at bedtime  azithromycin  IVPB 500 milliGRAM(s) IV Intermittent every 24 hours  cefTRIAXone   IVPB 1000 milliGRAM(s) IV Intermittent every 24 hours  chlorhexidine 2% Cloths 1 Application(s) Topical <User Schedule>  digoxin     Tablet 125 MICROGram(s) Oral daily  enoxaparin Injectable 70 milliGRAM(s) SubCutaneous every 12 hours  methylPREDNISolone sodium succinate Injectable 60 milliGRAM(s) IV Push every 8 hours  metoprolol tartrate 50 milliGRAM(s) Oral two times a day  norepinephrine Infusion 0.03 MICROgram(s)/kG/Min (4.15 mL/Hr) IV Continuous <Continuous>  pantoprazole    Tablet 40 milliGRAM(s) Oral before breakfast    MEDICATIONS  (PRN):  acetaminophen     Tablet .. 650 milliGRAM(s) Oral every 6 hours PRN Temp greater or equal to 38C (100.4F), Mild Pain (1 - 3)  meclizine 25 milliGRAM(s) Oral every 8 hours PRN Dizziness      Xrays:                                                                                     ECHO     Patient is a 87y old  Male who presents with a chief complaint of Hypoxic Respiratory Failure (25 Sep 2022 01:15)        Over Night Events: On BIPAP, CXR reviewed    PHYSICAL EXAM    ICU Vital Signs Last 24 Hrs  T(C): 36.6 (25 Sep 2022 08:37), Max: 36.7 (24 Sep 2022 20:30)  T(F): 97.9 (25 Sep 2022 08:37), Max: 98.1 (24 Sep 2022 20:30)  HR: 108 (25 Sep 2022 08:37) (103 - 147)  BP: 115/58 (25 Sep 2022 08:37) (95/60 - 116/53)  BP(mean): 83 (25 Sep 2022 08:37) (71 - 89)  RR: 20 (25 Sep 2022 08:37) (20 - 20)  SpO2: 98% (25 Sep 2022 08:42) (96% - 100%)    O2 Parameters below as of 25 Sep 2022 08:42  Patient On (Oxygen Delivery Method): nasal cannula  O2 Flow (L/min): 2          CONSTITUTIONAL:  ILL LOOKING  ENT:   Airway patent,   No thrush    EYES:   Clear bilaterally,   pupils equal,   round and reactive to light.    CARDIAC:   tachycardic  irregular rhythm.    no edema    RESPIRATORY:   decreased left breath sounds  No wheezing  Normal chest expansion  Not tachypneic,  No use of accessory muscles    GASTROINTESTINAL:  Abdomen soft,   non-tender,   no guarding,   + BS    MUSCULOSKELETAL:   range of motion is not limited,  no clubbing, cyanosis    NEUROLOGICAL:   Alert  no motor deficits.        LABS:                            9.2    11.19 )-----------( 189      ( 25 Sep 2022 01:03 )             28.7                                               09-25    138  |  90<L>  |  35<H>  ----------------------------<  143<H>  4.2   |  44<HH>  |  1.0    Ca    8.3<L>      25 Sep 2022 01:03  Mg     2.0     09-25    TPro  5.0<L>  /  Alb  2.2<L>  /  TBili  0.4  /  DBili  x   /  AST  41  /  ALT  29  /  AlkPhos  75  09-25                                                 CARDIAC MARKERS ( 23 Sep 2022 22:33 )  x     / 0.03 ng/mL / x     / x     / x                                                LIVER FUNCTIONS - ( 25 Sep 2022 01:03 )  Alb: 2.2 g/dL / Pro: 5.0 g/dL / ALK PHOS: 75 U/L / ALT: 29 U/L / AST: 41 U/L / GGT: x                    Procalcitonin, Serum: 0.06 ng/mL (09-23-22 @ 07:40)  C-Reactive Protein, Serum: 90.8 mg/L (09-23-22 @ 07:40)  Ferritin, Serum: 627 ng/mL (09-23-22 @ 07:40)  D-Dimer Assay, Quantitative: 452 ng/mL DDU (09-23-22 @ 06:15)                                        Culture - Blood (collected 23 Sep 2022 23:30)  Source: .Blood Blood-Peripheral  Preliminary Report (25 Sep 2022 07:02):    No growth to date.    Culture - Blood (collected 23 Sep 2022 07:40)  Source: .Blood Blood  Preliminary Report (24 Sep 2022 19:01):    No growth to date.                                                                                       ABG - ( 24 Sep 2022 11:38 )  pH, Arterial: 7.54  pH, Blood: x     /  pCO2: 60    /  pO2: 86    / HCO3: 51    / Base Excess: 24.5  /  SaO2: 98.0          MEDICATIONS  (STANDING):  albuterol/ipratropium for Nebulization 3 milliLiter(s) Nebulizer every 6 hours  atorvastatin 80 milliGRAM(s) Oral at bedtime  azithromycin  IVPB 500 milliGRAM(s) IV Intermittent every 24 hours  cefTRIAXone   IVPB 1000 milliGRAM(s) IV Intermittent every 24 hours  chlorhexidine 2% Cloths 1 Application(s) Topical <User Schedule>  digoxin     Tablet 125 MICROGram(s) Oral daily  enoxaparin Injectable 70 milliGRAM(s) SubCutaneous every 12 hours  methylPREDNISolone sodium succinate Injectable 60 milliGRAM(s) IV Push every 8 hours  metoprolol tartrate 50 milliGRAM(s) Oral two times a day  norepinephrine Infusion 0.03 MICROgram(s)/kG/Min (4.15 mL/Hr) IV Continuous <Continuous>  pantoprazole    Tablet 40 milliGRAM(s) Oral before breakfast    MEDICATIONS  (PRN):  acetaminophen     Tablet .. 650 milliGRAM(s) Oral every 6 hours PRN Temp greater or equal to 38C (100.4F), Mild Pain (1 - 3)  meclizine 25 milliGRAM(s) Oral every 8 hours PRN Dizziness      Xrays:                                                                                     ECHO

## 2022-09-25 NOTE — PROGRESS NOTE ADULT - ASSESSMENT
Mr. 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 (3LPM NC)  Suspected History of COPD in Setting of Smoking History  - improving, tolerating NC   -ss/p pleurx placement 9/23 with 2l removed 9/23  - s/p rapid response 9/23 PM for hypoxia, hypotension and rapid Afib with RVR,placed on bipap, drained additional 1L from pleurx and given metoprolol with some improvement  - Procal 0.06 and blood cx negative     -Rocephin and Azithromycin per ID  - check daily xrays  - NIV 4 on and 4on and PRN. Bedside swallow eval    PAF with RVR  Elevated Troponin: NSTEMI Likely Due to Demand Ischemia Secondary to Afib with RVR  - increase metoprolol to 50 Q8H. Can give IV metoprolol if NPO/on bipap  - c/w digoxin, levels 1 on 9/24  - c/w therapeutic Lovenox while hospitalized in case of procedures. Eliquis on dc     Mechanical Fall in ED  - per prior notes, no other details available   -Wound Care reccs    DNR/DNI    #Progress Note Handoff  Pending (specify):   clinical improvement, labs, tapering down supplemental 02, pleurx drainage PRN  Family discussion: Plan of care discussed with patient's son Jack at bedside, all questions answered  Disposition:  from home     Sosa Mccabe MD  s. [id number]

## 2022-09-25 NOTE — PROGRESS NOTE ADULT - SUBJECTIVE AND OBJECTIVE BOX
Patient is a 87y old  Male who presents with a chief complaint of Hypoxic Respiratory Failure (24 Sep 2022 12:42)      INTERVAL HPI/OVERNIGHT EVENTS:  None    FAMILY HISTORY:  FH: HTN (hypertension) (Mother)    FH: lung cancer (Father)    FHx: myocardial infarction (Child)      T(C): 36.7 (09-24-22 @ 20:30), Max: 36.7 (09-24-22 @ 05:00)  HR: 133 (09-24-22 @ 20:30) (103 - 133)  BP: 95/60 (09-24-22 @ 20:30) (95/60 - 121/69)  RR: 20 (09-24-22 @ 20:30) (18 - 20)  SpO2: 100% (09-24-22 @ 20:30) (97% - 100%)  Wt(kg): --Vital Signs Last 24 Hrs  T(C): 36.7 (24 Sep 2022 20:30), Max: 36.7 (24 Sep 2022 05:00)  T(F): 98.1 (24 Sep 2022 20:30), Max: 98.1 (24 Sep 2022 05:00)  HR: 133 (24 Sep 2022 20:30) (103 - 133)  BP: 95/60 (24 Sep 2022 20:30) (95/60 - 121/69)  BP(mean): 71 (24 Sep 2022 20:30) (71 - 92)  RR: 20 (24 Sep 2022 20:30) (18 - 20)  SpO2: 100% (24 Sep 2022 20:30) (97% - 100%)    Parameters below as of 24 Sep 2022 20:30  Patient On (Oxygen Delivery Method): nasal cannula        PHYSICAL EXAM:  GENERAL:  NAD on bipap   SKIN: No rashes or lesions  HEENT: Atraumatic. Normocephalic.  NECK: Supple, No JVD.  PULMONARY: absent breath sounds L. No wheezing   CVS: Normal S1, S2. Rate and Rhythm are regular   ABDOMEN/GI: Soft, Nontender, Nondistended   MSK:  No clubbing or cyanosis   NEUROLOGIC: Moves all extremities   PSYCH: Awake, confused, answers simple questions         acetaminophen     Tablet .. 650 milliGRAM(s) Oral every 6 hours PRN  albuterol/ipratropium for Nebulization 3 milliLiter(s) Nebulizer every 6 hours  atorvastatin 80 milliGRAM(s) Oral at bedtime  azithromycin  IVPB 500 milliGRAM(s) IV Intermittent every 24 hours  cefTRIAXone   IVPB 1000 milliGRAM(s) IV Intermittent every 24 hours  chlorhexidine 2% Cloths 1 Application(s) Topical <User Schedule>  digoxin     Tablet 125 MICROGram(s) Oral daily  enoxaparin Injectable 70 milliGRAM(s) SubCutaneous every 12 hours  meclizine 25 milliGRAM(s) Oral every 8 hours PRN  methylPREDNISolone sodium succinate Injectable 60 milliGRAM(s) IV Push every 8 hours  metoprolol tartrate 50 milliGRAM(s) Oral two times a day  norepinephrine Infusion 0.03 MICROgram(s)/kG/Min IV Continuous <Continuous>  pantoprazole    Tablet 40 milliGRAM(s) Oral before breakfast

## 2022-09-26 NOTE — HOSPICE CARE NOTE - CONVESATION DETAILS
Update from patient's daughter in law Nancy who reports patient is currently in the ICU at [facility]. Hospice admission visit on hold at this time. Nancy reports patient can not return home, patient requires SNF placement with financial screen. Hospice to remain available.

## 2022-09-26 NOTE — PROGRESS NOTE ADULT - SUBJECTIVE AND OBJECTIVE BOX
KEIKO SALDAÑA  87y Male    CHIEF COMPLAINT:    Patient is a 87y old  Male who presents with a chief complaint of Hypoxic Respiratory Failure (26 Sep 2022 08:56)    INTERVAL HPI/OVERNIGHT EVENTS:    Patient seen and examined. No acute events overnight. Mental status improving, tolerating NC     ROS: All other systems are negative.    Vital Signs:    T(F): 96.6 (09-26-22 @ 11:30), Max: 98 (09-26-22 @ 00:11)  HR: 125 (09-26-22 @ 11:30) (100 - 125)  BP: 127/58 (09-26-22 @ 11:30) (111/67 - 127/58)  RR: 20 (09-26-22 @ 11:30) (20 - 20)  SpO2: 92% (09-26-22 @ 11:30) (92% - 100%)    PHYSICAL EXAM:    GENERAL:  NAD  SKIN: No rashes or lesions  HEENT: Atraumatic. Normocephalic.    NECK: Supple, No JVD.    PULMONARY: absent breath sounds L. No wheezing. No rales  CVS: Normal S1, S2. Rate and Rhythm are regular.   ABDOMEN/GI: Soft, Nontender, Nondistended   MSK:  No clubbing or cyanosis   NEUROLOGIC:  moves all extremities   PSYCH: Awake and alert, answers simple questions appropriately     Consultant(s) Notes Reviewed:  [x ] YES  [ ] NO  Care Discussed with Consultants/Other Providers [ x] YES  [ ] NO    LABS:                        10.3   13.31 )-----------( 207      ( 26 Sep 2022 01:00 )             32.6     139  |  90<L>  |  44<H>  ----------------------------<  139<H>  4.4   |  45<HH>  |  0.9    Ca    8.4      26 Sep 2022 01:00  Mg     2.1     09-26    TPro  5.6<L>  /  Alb  2.4<L>  /  TBili  0.4  /  DBili  x   /  AST  42<H>  /  ALT  32  /  AlkPhos  86  09-26    Serum Pro-Brain Natriuretic Peptide: 4131 pg/mL (09-22-22 @ 16:30)    Trop 0.03, CKMB --, CK --, 09-23-22 @ 22:33    Culture - Blood (collected 23 Sep 2022 23:30)  Source: .Blood Blood-Peripheral  Preliminary Report (25 Sep 2022 07:02):    No growth to date.    RADIOLOGY & ADDITIONAL TESTS:  Imaging or report Personally Reviewed:  [x] YES  [ ] NO  EKG reviewed: [x] YES  [ ] NO    Medications:  Standing  acetylcysteine 10%  Inhalation 4 milliLiter(s) Inhalation daily  albuterol/ipratropium for Nebulization 3 milliLiter(s) Nebulizer every 6 hours  atorvastatin 80 milliGRAM(s) Oral at bedtime  chlorhexidine 2% Cloths 1 Application(s) Topical <User Schedule>  digoxin     Tablet 125 MICROGram(s) Oral daily  enoxaparin Injectable 70 milliGRAM(s) SubCutaneous every 12 hours  methylPREDNISolone sodium succinate Injectable 60 milliGRAM(s) IV Push every 8 hours  metoprolol tartrate 100 milliGRAM(s) Oral two times a day  norepinephrine Infusion 0.03 MICROgram(s)/kG/Min IV Continuous <Continuous>  pantoprazole    Tablet 40 milliGRAM(s) Oral before breakfast  sodium chloride 3%  Inhalation 4 milliLiter(s) Inhalation every 12 hours    PRN Meds  acetaminophen     Tablet .. 650 milliGRAM(s) Oral every 6 hours PRN  meclizine 25 milliGRAM(s) Oral every 8 hours PRN

## 2022-09-26 NOTE — CHART NOTE - NSCHARTNOTEFT_GEN_A_CORE
HPI:    Mr. Escobedo is an 87 year old (ex-smoker) male patient known to have:  - Baseline AOx3; lives with son  - History of MARILEE Non Small Cell Lung Cancer and Large Pleural Effusion s/p Pleurodesis 08/26/2022. On Home O2 (3LPM NC). Follows with Dr Fletcher  - Right Carotid Artery Stenosis s/p CEA in 2016  - Peripheral Artery Disease  - Hypertension  - Paroxysmal Atrial Fibrillation. Forgot name of cardiologist. On eliquis 5mg BID  - Vertigo  - History of cataract surgery  - History of umbilical hernia repair    He was brought to the ED on 09/22 for evaluation of worsening SOB.  History goes back to few days PTP when the patient started complaining of worsening SOB on minimal exertion and at rest on 3LPM home O2.  This has been associated with leg swelling and orthopnea in the absence of new cough, runny nose, sore throat, chest pain, palpitations, diaphoresis, or light headedness.    Upon presentation to the ED, the patient's vitals:  - /57mmHg  - -> 140 bpm; confirmed afib with RVR s/p IV Cardizem 10mg x 3 times then initiation of cardizem drip with improvement in HR to 101 bpm  - T 36.6 degrees  - SaO2 97% on 4LPM NC -> placed on BiPAP 16/8 FiO2 40% later  - RR 26 bpm  - Blood Gas Profile - Venous (09.22.22 @ 16:35)    pH, Venous: 7.42    pCO2, Venous: 93 mmHg    pO2, Venous: 21 mmHg    HCO3, Venous: 60 mmol/L    Base Excess, Venous: 29.9 mmol/L    Oxygen Saturation, Venous: 24.4 %    Investigations   Laboratory Workup  - CBC:                        12.8   9.60  )-----------( 243      ( 22 Sep 2022 16:30 )             40.6     - Chemistry:  09-22    137  |  82<L>  |  19  ----------------------------<  100<H>  3.8   |  49<HH>  |  0.7    Ca    8.7      22 Sep 2022 16:30    TPro  6.6  /  Alb  2.9<L>  /  TBili  0.8  /  DBili  x   /  AST  33  /  ALT  27  /  AlkPhos  94  09-22      CARDIAC MARKERS ( 22 Sep 2022 16:30 )  x     / 0.02 ng/mL / x     / x     / x          Microbiological Workup  * COVID negative  * Influenza A and B negative      Radiological Workup  * CT Angio Chest PE Protocol w/ IV Cont (09.23.22 @ 02:07) No evidence of pulmonary embolus Large left pleural effusion with consolidative volume loss of the left lung. This is associated with prevascular suboptimally imaged mass which contributes to narrowing and partial opacification of the left mainstem  bronchus. No definite lymphadenopathy by size criteria. Consolidative process in the right lower lobe may be secondary to  atelectasis as well as an infectious process.    - Patient had a mechanical fall in ED per sign out (no details provided and patient thinks it was mechanical; no light headedness or blacking out; unsure about HT; no LOC or seizure-like activity; no confusion after fall)  - Patient was placed on BiPAP 16/8 FiO2 40% in ED  - For atrial fibrillation with RVR, patient was given IV Cardizem 10mg x3 doses followed by initiation of Cardizem drip  - He received IV Rocephin 1g x1 and Azithromycin 500mg x1 doses   - He also received 1L LR bolus in ED        Pt underwent pleural drainage with 2L of fluid drained. Pt was also started on Rocephin and Azithromycin per ID. Metoprolol was used for HR control.      ------------------------------------------------------------------------------------------------------------------------    In the CEU:     Pt underwent a rapid response, It was found that he has left lung cavity was completely filled with pleural effusion despite 2L removal, an additional liter was removed at bedisde via pleurex.  Pt is currently following NIV 4 on and 4 off and PRN. Pt heart rate was also noted to be elevated, pt is currently on metoprolol 100 BID    For f/u-   [ ] Calorie count  [ ]check daily xrays,   [ ] f/u procal; BCX negative S/P Rocephin and Azithromycin

## 2022-09-26 NOTE — CONSULT NOTE ADULT - TIME BILLING
AF - likely HR increased from overall disease and relative hypoxia  - cotn AC  - stop dig  - start amio

## 2022-09-26 NOTE — PROGRESS NOTE ADULT - SUBJECTIVE AND OBJECTIVE BOX
Patient is a 87y old  Male who presents with a chief complaint of Hypoxic Respiratory Failure (25 Sep 2022 13:19)        Over Night Events: no drips. No pressors         ROS:     All ROS are negative except HPI         PHYSICAL EXAM    ICU Vital Signs Last 24 Hrs  T(C): 35.6 (26 Sep 2022 05:37), Max: 36.7 (26 Sep 2022 00:11)  T(F): 96 (26 Sep 2022 05:37), Max: 98 (26 Sep 2022 00:11)  HR: 106 (26 Sep 2022 05:37) (100 - 125)  BP: 111/67 (26 Sep 2022 05:37) (95/75 - 113/55)  BP(mean): 81 (25 Sep 2022 11:36) (81 - 81)  RR: 20 (26 Sep 2022 05:37) (20 - 20)  SpO2: 95% (26 Sep 2022 05:37) (95% - 100%)    O2 Parameters below as of 25 Sep 2022 11:36  Patient On (Oxygen Delivery Method): nasal cannula            CONSTITUTIONAL:  ill appearing     ENT:   Airway patent,   Mouth with normal mucosa.   on 3 L          CARDIAC:   tachycardiac          RESPIRATORY:   No wheezing  Bilateral BS  Normal chest expansion  Not tachypneic,  No use of accessory muscles    GASTROINTESTINAL:  Abdomen soft,   Non-tender,   No guarding,   + BS       NEUROLOGICAL:   Alert and oriented   No motor  deficits.    SKIN:   stage 2 sacral              LABS:                            10.3   13.31 )-----------( 207      ( 26 Sep 2022 01:00 )             32.6                                               09-26    139  |  90<L>  |  44<H>  ----------------------------<  139<H>  4.4   |  45<HH>  |  0.9    Ca    8.4      26 Sep 2022 01:00  Mg     2.1     09-26    TPro  5.6<L>  /  Alb  2.4<L>  /  TBili  0.4  /  DBili  x   /  AST  42<H>  /  ALT  32  /  AlkPhos  86  09-26                                                                                           LIVER FUNCTIONS - ( 26 Sep 2022 01:00 )  Alb: 2.4 g/dL / Pro: 5.6 g/dL / ALK PHOS: 86 U/L / ALT: 32 U/L / AST: 42 U/L / GGT: x                                                  Culture - Blood (collected 23 Sep 2022 23:30)  Source: .Blood Blood-Peripheral  Preliminary Report (25 Sep 2022 07:02):    No growth to date.                                                                                       ABG - ( 24 Sep 2022 11:38 )  pH, Arterial: 7.54  pH, Blood: x     /  pCO2: 60    /  pO2: 86    / HCO3: 51    / Base Excess: 24.5  /  SaO2: 98.0                MEDICATIONS  (STANDING):  albuterol/ipratropium for Nebulization 3 milliLiter(s) Nebulizer every 6 hours  atorvastatin 80 milliGRAM(s) Oral at bedtime  chlorhexidine 2% Cloths 1 Application(s) Topical <User Schedule>  digoxin     Tablet 125 MICROGram(s) Oral daily  enoxaparin Injectable 70 milliGRAM(s) SubCutaneous every 12 hours  methylPREDNISolone sodium succinate Injectable 60 milliGRAM(s) IV Push every 8 hours  metoprolol tartrate 50 milliGRAM(s) Oral every 6 hours  norepinephrine Infusion 0.03 MICROgram(s)/kG/Min (4.15 mL/Hr) IV Continuous <Continuous>  pantoprazole    Tablet 40 milliGRAM(s) Oral before breakfast    MEDICATIONS  (PRN):  acetaminophen     Tablet .. 650 milliGRAM(s) Oral every 6 hours PRN Temp greater or equal to 38C (100.4F), Mild Pain (1 - 3)  meclizine 25 milliGRAM(s) Oral every 8 hours PRN Dizziness         CXR interpreted by me:  Left sided collapse      Patient is a 87y old  Male who presents with a chief complaint of Hypoxic Respiratory Failure (25 Sep 2022 13:19)        Over Night Events: no drips. No pressors, rapid A fib        PHYSICAL EXAM    ICU Vital Signs Last 24 Hrs  T(C): 35.6 (26 Sep 2022 05:37), Max: 36.7 (26 Sep 2022 00:11)  T(F): 96 (26 Sep 2022 05:37), Max: 98 (26 Sep 2022 00:11)  HR: 106 (26 Sep 2022 05:37) (100 - 125)  BP: 111/67 (26 Sep 2022 05:37) (95/75 - 113/55)  BP(mean): 81 (25 Sep 2022 11:36) (81 - 81)  RR: 20 (26 Sep 2022 05:37) (20 - 20)  SpO2: 95% (26 Sep 2022 05:37) (95% - 100%)    O2 Parameters below as of 25 Sep 2022 11:36  Patient On (Oxygen Delivery Method): nasal cannula            CONSTITUTIONAL:  ill appearing     ENT:   Airway patent,   Mouth with normal mucosa.   on 3 L          CARDIAC:   irregular        RESPIRATORY:   no air entry l side    GASTROINTESTINAL:  Abdomen soft,   Non-tender,   No guarding,   + BS       NEUROLOGICAL:   Alert and oriented   No motor  deficits.    SKIN:   stage 2 sacral              LABS:                            10.3   13.31 )-----------( 207      ( 26 Sep 2022 01:00 )             32.6                                               09-26    139  |  90<L>  |  44<H>  ----------------------------<  139<H>  4.4   |  45<HH>  |  0.9    Ca    8.4      26 Sep 2022 01:00  Mg     2.1     09-26    TPro  5.6<L>  /  Alb  2.4<L>  /  TBili  0.4  /  DBili  x   /  AST  42<H>  /  ALT  32  /  AlkPhos  86  09-26                                                                                           LIVER FUNCTIONS - ( 26 Sep 2022 01:00 )  Alb: 2.4 g/dL / Pro: 5.6 g/dL / ALK PHOS: 86 U/L / ALT: 32 U/L / AST: 42 U/L / GGT: x                                                  Culture - Blood (collected 23 Sep 2022 23:30)  Source: .Blood Blood-Peripheral  Preliminary Report (25 Sep 2022 07:02):    No growth to date.                                                                                       ABG - ( 24 Sep 2022 11:38 )  pH, Arterial: 7.54  pH, Blood: x     /  pCO2: 60    /  pO2: 86    / HCO3: 51    / Base Excess: 24.5  /  SaO2: 98.0                MEDICATIONS  (STANDING):  albuterol/ipratropium for Nebulization 3 milliLiter(s) Nebulizer every 6 hours  atorvastatin 80 milliGRAM(s) Oral at bedtime  chlorhexidine 2% Cloths 1 Application(s) Topical <User Schedule>  digoxin     Tablet 125 MICROGram(s) Oral daily  enoxaparin Injectable 70 milliGRAM(s) SubCutaneous every 12 hours  methylPREDNISolone sodium succinate Injectable 60 milliGRAM(s) IV Push every 8 hours  metoprolol tartrate 50 milliGRAM(s) Oral every 6 hours  norepinephrine Infusion 0.03 MICROgram(s)/kG/Min (4.15 mL/Hr) IV Continuous <Continuous>  pantoprazole    Tablet 40 milliGRAM(s) Oral before breakfast    MEDICATIONS  (PRN):  acetaminophen     Tablet .. 650 milliGRAM(s) Oral every 6 hours PRN Temp greater or equal to 38C (100.4F), Mild Pain (1 - 3)  meclizine 25 milliGRAM(s) Oral every 8 hours PRN Dizziness         CXR interpreted by me:  Left sided collapse

## 2022-09-26 NOTE — PROGRESS NOTE ADULT - ASSESSMENT
IMPRESSION:    Acute on chronic hypoxic respiratory failure on BiPAP  Chronic hypercapnic respiratory failure  HO MARILEE NSCLC with MPE s/p pleurodesis 8/22  LArge left pleural effusion was scheduled for IPC/ complete atelectasis l side  HO paroxysmal afib.  Now RVR   HO COPD not in acute exacerbation    PLAN:    CNS: Avoid CNS depressants.    HEENT: Oral care    PULMONARY:  HOB @ 45 degrees. Encourage NIV 4 hr on off and during sleep. RVP and COVID negative. Hypertonic saline nebs , mucomyst.    CARDIOVASCULAR: Hold lasix for hypotension. C/w rate control. Avoid overload. midodrine if hypotensive    GI: GI prophylaxis.  Feeding when off NIV.    RENAL:  Follow up lytes.  Correct as needed    INFECTIOUS DISEASE: Follow up cultures. Procal.     HEMATOLOGICAL:  DVT prophylaxis.  cw  Lovenox    ENDOCRINE:  Follow up FS.  Insulin protocol if needed.    MUSCULOSKELETAL: Bed rest    downgrade to tele    IMPRESSION:    Acute on chronic hypoxic respiratory failure  Chronic hypercapnic respiratory failure  HO MARILEE NSCLC with MPE s/p pleurodesis 8/22  LArge left pleural effusion was scheduled for IPC/ complete atelectasis l side  HO paroxysmal afib.  Now RVR   HO COPD not in acute exacerbation    PLAN:    CNS: Avoid CNS depressants.    HEENT: Oral care    PULMONARY:  HOB @ 45 degrees. Encourage NIV 4 hr on off and during sleep. RVP and COVID negative. Hypertonic saline nebs , mucomyst.    CARDIOVASCULAR: Hold lasix for hypotension. C/w rate control. Avoid overload. midodrine if hypotensive    GI: GI prophylaxis.  Feeding when off NIV.    RENAL:  Follow up lytes.  Correct as needed    INFECTIOUS DISEASE: Follow up cultures. Procal.     HEMATOLOGICAL:  DVT prophylaxis.  cw  Lovenox    ENDOCRINE:  Follow up FS.  Insulin protocol if needed.    MUSCULOSKELETAL: Bed rest    downgrade to tele

## 2022-09-26 NOTE — PROGRESS NOTE ADULT - ASSESSMENT
Mr. 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 Bedside swallow eval    PAF with RVR  Elevated Troponin: NSTEMI Likely Due to Demand Ischemia Secondary to Afib with RVR  - increase metoprolol to [id number]. Add cardizem if rate not controlled.   - c/w digoxin, levels 1 on 9/24  - c/w therapeutic Lovenox while hospitalized in case of procedures. Eliquis on dc  - EP eval     Mechanical Fall in ED  - per prior notes, no other details available   -Wound Care reccs    DNR/DNI    #Progress Note Handoff  Pending (specify):   clinical improvement, labs, tapering down supplemental 02, pleurx drainage PRN, rate control   Family discussion: Plan of care discussed with patient's son Jack at bedside, all questions answered  Disposition:  from home     Sosa Mccabe MD  s. [id number]

## 2022-09-26 NOTE — CONSULT NOTE ADULT - ASSESSMENT
Impression   # AHRF secondary to left pleural effusion and left lung collapse   # MARILEE NSCLC on radiation therapy   # A fib with RVR - On metoprolol and digoxin - Likely driven by hypoxemia     Recommendations   - We recommend stopping digoxin   - Start IV amiodarone load then switch to 400mg BID for for 1 week followed by 200mg BID for another week, followed by 200mg daily   - Monitor LFTs   - Continue current metoprolol dose   - Continue AC   - Consider gentle hydration   - Hypoxemia management by pulm     Discussed with attending

## 2022-09-26 NOTE — CONSULT NOTE ADULT - SUBJECTIVE AND OBJECTIVE BOX
Outpt cardiologist: Dr. Levine    Reason for Consult: A fib with RVR    HPI:  Location: [facility] ER Hold [id number] A ([facility] ER Hold)  Patient Name: KEIKO SALDAÑA  Age: 87y  Gender: Male      History of Present Illness  Mr. Saldaña is an 87 year old (ex-smoker) male patient known to have:  - Baseline AOx3; lives with son  - History of MARILEE Non Small Cell Lung Cancer and Large Pleural Effusion s/p Pleurodesis 2022. On Home O2 (3LPM NC). Follows with Dr Fletcher  - Right Carotid Artery Stenosis s/p CEA in 2016  - Peripheral Artery Disease  - Hypertension  - Paroxysmal Atrial Fibrillation. Forgot name of cardiologist. On eliquis 5mg BID  - Vertigo  - History of cataract surgery  - History of umbilical hernia repair      He was brought to the ED on  for evaluation of worsening SOB.  History goes back to few days PTP when the patient started complaining of worsening SOB on minimal exertion and at rest on 3LPM home O2.  This has been associated with leg swelling and orthopnea in the absence of new cough, runny nose, sore throat, chest pain, palpitations, diaphoresis, or light headedness.      On review of systems, patient denies any recent fever, chills, night sweats, urinary symptoms (urinary frequency, urgency, intermittence, dysuria, foul smelling urine, cloudy urine), change in bowel movements (diarrhea or constipation), abdominal pain, headache, nausea, or vomiting.   No sick contacts.   No recent travel or exposure to recent travelers.      Upon presentation to the ED, the patient's vitals:  - /57mmHg  - -> 140 bpm; confirmed afib with RVR s/p IV Cardizem 10mg x 3 times then initiation of cardizem drip with improvement in HR to 101 bpm  - T 36.6 degrees  - SaO2 97% on 4LPM NC -> placed on BiPAP 16/8 FiO2 40% later  - RR 26 bpm  - Blood Gas Profile - Venous (22 @ 16:35)    pH, Venous: 7.42    pCO2, Venous: 93 mmHg    pO2, Venous: 21 mmHg    HCO3, Venous: 60 mmol/L    Base Excess, Venous: 29.9 mmol/L    Oxygen Saturation, Venous: 24.4 %      Investigations   Laboratory Workup  - CBC:                        12.8   9.60  )-----------( 243      ( 22 Sep 2022 16:30 )             40.6     - Chemistry:      137  |  82<L>  |  19  ----------------------------<  100<H>  3.8   |  49<HH>  |  0.7    Ca    8.7      22 Sep 2022 16:30    TPro  6.6  /  Alb  2.9<L>  /  TBili  0.8  /  DBili  x   /  AST  33  /  ALT  27  /  AlkPhos  94        CARDIAC MARKERS ( 22 Sep 2022 16:30 )  x     / 0.02 ng/mL / x     / x     / x          Microbiological Workup  * COVID negative  * Influenza A and B negative      Radiological Workup  * CT Angio Chest PE Protocol w/ IV Cont (22 @ 02:07) No evidence of pulmonary embolus Large left pleural effusion with consolidative volume loss of the left lung. This is associated with prevascular suboptimally imaged mass which contributes to narrowing and partial opacification of the left mainstem  bronchus. No definite lymphadenopathy by size criteria. Consolidative process in the right lower lobe may be secondary to  atelectasis as well as an infectious process.      - Patient had a mechanical fall in ED per sign out (no details provided and patient thinks it was mechanical; no light headedness or blacking out; unsure about HT; no LOC or seizure-like activity; no confusion after fall)  - Patient was placed on BiPAP 16/8 FiO2 40% in ED  - For atrial fibrillation with RVR, patient was given IV Cardizem 10mg x3 doses followed by initiation of Cardizem drip  - He received IV Rocephin 1g x1 and Azithromycin 500mg x1 doses   - He also received 1L LR bolus in ED  - He will be admitted to MICU for further investigations, monitoring, and management       (23 Sep 2022 03:45)        PREVIOUS CARDIAC WORKUP    Risk Factors:      PAST MEDICAL & SURGICAL HISTORY  Vertigo    HTN (hypertension)    HLD (hyperlipidemia)    History of CEA (carotid endarterectomy)    S/P cataract surgery    H/O umbilical hernia repair        FAMILY HISTORY:  FAMILY HISTORY:  FH: HTN (hypertension) (Mother)    FH: lung cancer (Father)    FHx: myocardial infarction (Child)        SOCIAL HISTORY:  Social History:  Please refer to above for more details (23 Sep 2022 03:45)      ALLERGIES:  No Known Allergies      MEDICATIONS:  acetylcysteine 10%  Inhalation 4 milliLiter(s) Inhalation daily  albuterol/ipratropium for Nebulization 3 milliLiter(s) Nebulizer every 6 hours  atorvastatin 80 milliGRAM(s) Oral at bedtime  chlorhexidine 2% Cloths 1 Application(s) Topical <User Schedule>  digoxin     Tablet 125 MICROGram(s) Oral daily  enoxaparin Injectable 70 milliGRAM(s) SubCutaneous every 12 hours  methylPREDNISolone sodium succinate Injectable 60 milliGRAM(s) IV Push two times a day  metoprolol tartrate 100 milliGRAM(s) Oral two times a day  norepinephrine Infusion 0.03 MICROgram(s)/kG/Min (4.15 mL/Hr) IV Continuous <Continuous>  pantoprazole    Tablet 40 milliGRAM(s) Oral before breakfast  sodium chloride 3%  Inhalation 4 milliLiter(s) Inhalation every 12 hours    PRN:  acetaminophen     Tablet .. 650 milliGRAM(s) Oral every 6 hours PRN  meclizine 25 milliGRAM(s) Oral every 8 hours PRN      HOME MEDICATIONS:  Home Medications:  acetaminophen 325 mg oral tablet: 2 tab(s) orally every 6 hours, As needed, Temp greater or equal to 38C (100.4F), Mild Pain (1 - 3) (05 May 2022 21:20)  atorvastatin 80 mg oral tablet: 1 tab(s) orally once a day (02 May 2022 15:59)  Lasix 40 mg oral tablet: 1 tab(s) orally once a day (23 Sep 2022 04:18)  meclizine 25 mg oral tablet, chewable: 1 tab(s) orally 3 times a day, As Needed (02 May 2022 15:59)      VITALS:   T(F): 96.5 ( @ 16:00), Max: 98.1 ( @ 05:00)  HR: 126 ( @ 16:00) (100 - 147)  BP: 105/50 ( @ 16:00) (76/53 - 127/58)  BP(mean): 81 ( @ 11:36) (59 - 92)  RR: 20 ( @ 16:00) (18 - 22)  SpO2: 94% ( @ 16:00) (86% - 100%)    I&O's Summary      REVIEW OF SYSTEMS:  CONSTITUTIONAL: No weakness, fevers or chills  HEENT: No visual changes, neck/ear pain  RESPIRATORY: No cough, sob  CARDIOVASCULAR: See HPI  GASTROINTESTINAL: No abdominal pain. No nausea, vomiting, diarrhea   GENITOURINARY: No dysuria, frequency or hematuria  NEUROLOGICAL: No new focal deficits  SKIN: No new rashes    PHYSICAL EXAM:  General: Not in distress. Frail and chronically ill appearing.   HEENT: EOMI  Cardio: Irregular, S1, S2, no murmur  Pulm: Poor air entry at the left.   Abdomen: Soft, non-tender, non-distended. Normoactive bowel sounds  Extremities: No edema b/l le  Neuro: A&O x3. No focal deficits    LABS:                        10.3   13.31 )-----------( 207      ( 26 Sep 2022 01:00 )             32.6         139  |  90<L>  |  44<H>  ----------------------------<  139<H>  4.4   |  45<HH>  |  0.9    Ca    8.4      26 Sep 2022 01:00  Mg     2.1         TPro  5.6<L>  /  Alb  2.4<L>  /  TBili  0.4  /  DBili  x   /  AST  42<H>  /  ALT  32  /  AlkPhos  86        Troponin trend:       Chol 108 LDL -- HDL 52 Trig 86  COVID-19 PCR: NotDetec (2022 13:27)  COVID-19 PCR: NotDetec (2022 08:56)  SARS-CoV-2: NotDetec (02 May 2022 04:50)      IMAGING:  -CXR:  < from: Xray Chest 1 View- PORTABLE-Routine (Xray Chest 1 View- PORTABLE-Routine in AM.) (22 @ 06:26) >  Impression:    Unchanged completely opacified left hemithorax.    < end of copied text >    -TTE:  < from: TTE Echo Complete w/ Contrast w/ Doppler (22 @ 09:12) >  Summary:     1. Technically difficult study with limited diagnostic information.   2. Normal global left ventricular systolic function with increased wall   thickness and normal internal cavity size.   3. Left ventricular ejection fraction, by visual estimation, is 65 to   70%.   4. The left ventricular diastolic function could not be assessed in this   study due to atrial arrhythmia.   5. Normal right ventricular size and function.   6. Mild aortic valve stenosis.   7. No pericardial effusion.   8. Compared to prior study from 2022, findings are simlar.    < end of copied text >      EC Lead ECG:   Ventricular Rate 112 BPM    Atrial Rate 97 BPM    QRS Duration 80 ms    Q-T Interval 290 ms    QTC Calculation(Bazett) 395 ms    R Axis -1 degrees    T Axis 118 degrees    Diagnosis Line Atrial fibrillation with rapid ventricular response  Low voltage QRS  Septal infarct , age undetermined  Abnormal ECG    Confirmed by Nelly Perez MD ([id number]) on 2022 12:19:38 PM ( @ 23:02)      TELEMETRY EVENTS:    A fib with RVR

## 2022-09-27 NOTE — PROGRESS NOTE ADULT - ASSESSMENT
IMPRESSION:    Acute on chronic hypoxic respiratory failure  Chronic hypercapnic respiratory failure  HO MARILEE NSCLC with MPE s/p pleurodesis 8/22  LArge left pleural effusion was scheduled for IPC/ complete atelectasis l side  HO paroxysmal afib.  Now RVR   HO COPD not in acute exacerbation    PLAN:    CNS: Avoid CNS depressants.    HEENT: Oral care    PULMONARY:  HOB @ 45 degrees. Encourage NIV 4 hr on off and during sleep. RVP and COVID negative. Hypertonic saline nebs , mucomyst.    CARDIOVASCULAR: Hold lasix for hypotension. C/w rate control. Avoid overload. midodrine if hypotensive    GI: GI prophylaxis.  Feeding when off NIV.    RENAL:  Follow up lytes.  Correct as needed    INFECTIOUS DISEASE: Follow up cultures. Procal.     HEMATOLOGICAL:  DVT prophylaxis.  cw  Lovenox    ENDOCRINE:  Follow up FS.  Insulin protocol if needed.    MUSCULOSKELETAL: Bed rest    downgrade to tele    IMPRESSION:    Acute on chronic hypoxic respiratory failure on NC  Chronic hypercapnic respiratory failure  HO MARILEE NSCLC with MPE s/p pleurodesis 8/22  LArge left pleural effusion was scheduled for IPC/ complete atelectasis l side  HO paroxysmal afib.  Now RVR   HO COPD not in acute exacerbation    PLAN:    CNS: Avoid CNS depressants.    HEENT: Oral care    PULMONARY:  HOB @ 45 degrees. Encourage NIV 4 hr on off and during sleep. RVP and COVID negative. Drain pleurx    CARDIOVASCULAR: Hold lasix for hypotension. C/w rate control. Avoid overload. midodrine if hypotensive    GI: GI prophylaxis.  Feeding    RENAL:  Follow up lytes.  Correct as needed    HEMATOLOGICAL:  DVT prophylaxis.  cw  Lovenox    ENDOCRINE:  Follow up FS.  Insulin protocol if needed.    MUSCULOSKELETAL: Bed rest    downgrade to tele

## 2022-09-27 NOTE — HOSPICE CARE NOTE - CONVESATION DETAILS
Hospice working with family on D/C plan. At this time family seeking SNF placement with financial screen.

## 2022-09-27 NOTE — PROGRESS NOTE ADULT - SUBJECTIVE AND OBJECTIVE BOX
Patient is a 87y old  Male who presents with a chief complaint of Hypoxic Respiratory Failure (26 Sep 2022 17:53)        Over Night Events: stable on NC. Looks ill         ROS:     All ROS are negative except HPI         PHYSICAL EXAM    ICU Vital Signs Last 24 Hrs  T(C): 36.1 (27 Sep 2022 07:49), Max: 36.1 (27 Sep 2022 07:49)  T(F): 97 (27 Sep 2022 07:49), Max: 97 (27 Sep 2022 07:49)  HR: 101 (27 Sep 2022 07:49) (96 - 143)  BP: 121/74 (27 Sep 2022 07:49) (102/50 - 137/80)  BP(mean): 80 (27 Sep 2022 05:48) (70 - 100)  RR: 20 (27 Sep 2022 07:49) (20 - 20)  SpO2: 98% (27 Sep 2022 07:49) (92% - 100%)    O2 Parameters below as of 27 Sep 2022 05:48  Patient On (Oxygen Delivery Method): nasal cannula            CONSTITUTIONAL:  ill appearing    ENT:   Airway patent,   Mouth with normal mucosa.   on NC       CARDIAC:   Normal rate,   Regular rhythm.    LE edema       RESPIRATORY:   No wheezing  Bilateral BS  Normal chest expansion  Not tachypneic,  No use of accessory muscles    GASTROINTESTINAL:  Abdomen soft,   Non-tender,   No guarding,   + BS       NEUROLOGICAL:   Alert and oriented   No motor  deficits.    SKIN:   Skin normal color for race,   Warm and dry and intact.   No evidence of rash.           09-26-22 @ 07:01  -  09-27-22 @ 07:00  --------------------------------------------------------  IN:  Total IN: 0 mL    OUT:    Voided (mL): 300 mL  Total OUT: 300 mL    Total NET: -300 mL          LABS:                            10.2   12.02 )-----------( 201      ( 27 Sep 2022 01:09 )             32.8                                               09-27    137  |  88<L>  |  42<H>  ----------------------------<  133<H>  4.5   |  43<HH>  |  0.8    Ca    8.3<L>      27 Sep 2022 01:09  Mg     2.3     09-27    TPro  5.5<L>  /  Alb  2.4<L>  /  TBili  0.3  /  DBili  x   /  AST  43<H>  /  ALT  35  /  AlkPhos  87  09-27                                                                                           LIVER FUNCTIONS - ( 27 Sep 2022 01:09 )  Alb: 2.4 g/dL / Pro: 5.5 g/dL / ALK PHOS: 87 U/L / ALT: 35 U/L / AST: 43 U/L / GGT: x                                                                                                                                       MEDICATIONS  (STANDING):  acetylcysteine 10%  Inhalation 4 milliLiter(s) Inhalation daily  aMIOdarone Infusion 1 mG/Min (33.3 mL/Hr) IV Continuous <Continuous>  aMIOdarone Infusion 0.5 mG/Min (16.7 mL/Hr) IV Continuous <Continuous>  atorvastatin 80 milliGRAM(s) Oral at bedtime  chlorhexidine 2% Cloths 1 Application(s) Topical <User Schedule>  enoxaparin Injectable 70 milliGRAM(s) SubCutaneous every 12 hours  methylPREDNISolone sodium succinate Injectable 60 milliGRAM(s) IV Push two times a day  metoprolol tartrate 100 milliGRAM(s) Oral two times a day  norepinephrine Infusion 0.03 MICROgram(s)/kG/Min (4.15 mL/Hr) IV Continuous <Continuous>  pantoprazole    Tablet 40 milliGRAM(s) Oral before breakfast  sodium chloride 3%  Inhalation 4 milliLiter(s) Inhalation every 12 hours    MEDICATIONS  (PRN):  acetaminophen     Tablet .. 650 milliGRAM(s) Oral every 6 hours PRN Temp greater or equal to 38C (100.4F), Mild Pain (1 - 3)  ALBUTerol    90 MICROgram(s) HFA Inhaler 2 Puff(s) Inhalation every 6 hours PRN Shortness of Breath  meclizine 25 milliGRAM(s) Oral every 8 hours PRN Dizziness           CXR interpreted by me:  Left sided opacity      Patient is a 87y old  Male who presents with a chief complaint of Hypoxic Respiratory Failure (26 Sep 2022 17:53)        Over Night Events: stable on NC. on amiodarone drip        PHYSICAL EXAM    ICU Vital Signs Last 24 Hrs  T(C): 36.1 (27 Sep 2022 07:49), Max: 36.1 (27 Sep 2022 07:49)  T(F): 97 (27 Sep 2022 07:49), Max: 97 (27 Sep 2022 07:49)  HR: 101 (27 Sep 2022 07:49) (96 - 143)  BP: 121/74 (27 Sep 2022 07:49) (102/50 - 137/80)  BP(mean): 80 (27 Sep 2022 05:48) (70 - 100)  RR: 20 (27 Sep 2022 07:49) (20 - 20)  SpO2: 98% (27 Sep 2022 07:49) (92% - 100%)    O2 Parameters below as of 27 Sep 2022 05:48  Patient On (Oxygen Delivery Method): nasal cannula            CONSTITUTIONAL:  ill appearing    ENT:   Airway patent,   Mouth with normal mucosa.   on NC       CARDIAC:   irregular  LE edema       RESPIRATORY:   dec bs l side    GASTROINTESTINAL:  Abdomen soft,   Non-tender,   No guarding,   + BS       NEUROLOGICAL:   Alert and oriented   No motor  deficits.    SKIN:   Skin normal color for race,   Warm and dry and intact.   No evidence of rash.           09-26-22 @ 07:01  -  09-27-22 @ 07:00  --------------------------------------------------------  IN:  Total IN: 0 mL    OUT:    Voided (mL): 300 mL  Total OUT: 300 mL    Total NET: -300 mL          LABS:                            10.2   12.02 )-----------( 201      ( 27 Sep 2022 01:09 )             32.8                                               09-27    137  |  88<L>  |  42<H>  ----------------------------<  133<H>  4.5   |  43<HH>  |  0.8    Ca    8.3<L>      27 Sep 2022 01:09  Mg     2.3     09-27    TPro  5.5<L>  /  Alb  2.4<L>  /  TBili  0.3  /  DBili  x   /  AST  43<H>  /  ALT  35  /  AlkPhos  87  09-27                                                                                           LIVER FUNCTIONS - ( 27 Sep 2022 01:09 )  Alb: 2.4 g/dL / Pro: 5.5 g/dL / ALK PHOS: 87 U/L / ALT: 35 U/L / AST: 43 U/L / GGT: x                                                                                                                                       MEDICATIONS  (STANDING):  acetylcysteine 10%  Inhalation 4 milliLiter(s) Inhalation daily  aMIOdarone Infusion 1 mG/Min (33.3 mL/Hr) IV Continuous <Continuous>  aMIOdarone Infusion 0.5 mG/Min (16.7 mL/Hr) IV Continuous <Continuous>  atorvastatin 80 milliGRAM(s) Oral at bedtime  chlorhexidine 2% Cloths 1 Application(s) Topical <User Schedule>  enoxaparin Injectable 70 milliGRAM(s) SubCutaneous every 12 hours  methylPREDNISolone sodium succinate Injectable 60 milliGRAM(s) IV Push two times a day  metoprolol tartrate 100 milliGRAM(s) Oral two times a day  norepinephrine Infusion 0.03 MICROgram(s)/kG/Min (4.15 mL/Hr) IV Continuous <Continuous>  pantoprazole    Tablet 40 milliGRAM(s) Oral before breakfast  sodium chloride 3%  Inhalation 4 milliLiter(s) Inhalation every 12 hours    MEDICATIONS  (PRN):  acetaminophen     Tablet .. 650 milliGRAM(s) Oral every 6 hours PRN Temp greater or equal to 38C (100.4F), Mild Pain (1 - 3)  ALBUTerol    90 MICROgram(s) HFA Inhaler 2 Puff(s) Inhalation every 6 hours PRN Shortness of Breath  meclizine 25 milliGRAM(s) Oral every 8 hours PRN Dizziness           CXR interpreted by me:  Left sided opacity

## 2022-09-27 NOTE — PROGRESS NOTE ADULT - ASSESSMENT
Mr. 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 (3LPM NC)  complete opacification of L thorax  Suspected History of COPD in Setting of Smoking History  - improving, tolerating NC, c/w NIV PRN  -ss/p pleurx placement 9/23 with 2l removed 9/23  - s/p rapid response 9/23 PM for hypoxia, hypotension and rapid Afib with RVR,placed on bipap, drained additional 1L from pleurx and given metoprolol with some improvement  - Procal 0.06 and blood cx negative     - off antibiotics, hypertonic saline nebs   - on solumedrol 60 Q12H, start Prednisone tomorrow   - check daily xrays  - pulm to drain pleurx today    PAF with RVR  Elevated Troponin: NSTEMI Likely Due to Demand Ischemia Secondary to Afib with RVR  - increased metoprolol to [id number]  - Dig levels 1  - c/w therapeutic Lovenox while hospitalized in case of procedures. Eliquis on dc  - EP eval appreciated stop dig, started on IV amio, convert to PO after 24 hours     Mechanical Fall in ED  - per prior notes, no other details available   -Wound Care reccs    DNR/DNI    #Progress Note Handoff  Pending (specify):   clinical improvement, labs, tapering down supplemental 02, pleurx drainage PRN, rate control   Family discussion: Plan of care discussed with patient's son Jack at bedside, all questions answered  Disposition: SNF with hospice     Sosa Mccabe MD  s. [id number]

## 2022-09-27 NOTE — PROGRESS NOTE ADULT - ATTENDING COMMENTS
events noted, no improvement in CXR with drainage, complete atelectasis, bipap, poor prognosis, palliative care eval
events noted, on amiodarone drip, change to po, floor, palliative care eval
events noted, worsening status, altered on BIPAP,  SP pleurx cath, will drain today, Fup cbc very poor prognosis, palliative care eval
events noted, CXR reviewed, drain pleurx , rate control, palliative care, eval

## 2022-09-27 NOTE — PROGRESS NOTE ADULT - SUBJECTIVE AND OBJECTIVE BOX
NAME: KEIKO SALDAÑA  MRN: [id number]  LOCATION: [address] 0[address] A    Patient is a 87y old  Male who presents with a chief complaint of Hypoxic Respiratory Failure (27 Sep 2022 09:22)      HPI:  Location: [facility] ER Hold 016 A ([facility] ER Hold)  Patient Name: KEIKO SALDAÑA  Age: 87y  Gender: Male      History of Present Illness  Mr. Saldaña is an 87 year old (ex-smoker) male patient known to have:  - Baseline AOx3; lives with son  - History of MARILEE Non Small Cell Lung Cancer and Large Pleural Effusion s/p Pleurodesis 08/26/2022. On Home O2 (3LPM NC). Follows with Dr Fletcher  - Right Carotid Artery Stenosis s/p CEA in 2016  - Peripheral Artery Disease  - Hypertension  - Paroxysmal Atrial Fibrillation. Forgot name of cardiologist. On eliquis 5mg BID  - Vertigo  - History of cataract surgery  - History of umbilical hernia repair      He was brought to the ED on 09/22 for evaluation of worsening SOB.  History goes back to few days PTP when the patient started complaining of worsening SOB on minimal exertion and at rest on 3LPM home O2.  This has been associated with leg swelling and orthopnea in the absence of new cough, runny nose, sore throat, chest pain, palpitations, diaphoresis, or light headedness.      On review of systems, patient denies any recent fever, chills, night sweats, urinary symptoms (urinary frequency, urgency, intermittence, dysuria, foul smelling urine, cloudy urine), change in bowel movements (diarrhea or constipation), abdominal pain, headache, nausea, or vomiting.   No sick contacts.   No recent travel or exposure to recent travelers.      Upon presentation to the ED, the patient's vitals:  - /57mmHg  - -> 140 bpm; confirmed afib with RVR s/p IV Cardizem 10mg x 3 times then initiation of cardizem drip with improvement in HR to 101 bpm  - T 36.6 degrees  - SaO2 97% on 4LPM NC -> placed on BiPAP 16/8 FiO2 40% later  - RR 26 bpm  - Blood Gas Profile - Venous (09.22.22 @ 16:35)    pH, Venous: 7.42    pCO2, Venous: 93 mmHg    pO2, Venous: 21 mmHg    HCO3, Venous: 60 mmol/L    Base Excess, Venous: 29.9 mmol/L    Oxygen Saturation, Venous: 24.4 %      Investigations   Laboratory Workup  - CBC:                        12.8   9.60  )-----------( 243      ( 22 Sep 2022 16:30 )             40.6     - Chemistry:  09-22    137  |  82<L>  |  19  ----------------------------<  100<H>  3.8   |  49<HH>  |  0.7    Ca    8.7      22 Sep 2022 16:30    TPro  6.6  /  Alb  2.9<L>  /  TBili  0.8  /  DBili  x   /  AST  33  /  ALT  27  /  AlkPhos  94  09-22      CARDIAC MARKERS ( 22 Sep 2022 16:30 )  x     / 0.02 ng/mL / x     / x     / x          Microbiological Workup  * COVID negative  * Influenza A and B negative      Radiological Workup  * CT Angio Chest PE Protocol w/ IV Cont (09.23.22 @ 02:07) No evidence of pulmonary embolus Large left pleural effusion with consolidative volume loss of the left lung. This is associated with prevascular suboptimally imaged mass which contributes to narrowing and partial opacification of the left mainstem  bronchus. No definite lymphadenopathy by size criteria. Consolidative process in the right lower lobe may be secondary to  atelectasis as well as an infectious process.      - Patient had a mechanical fall in ED per sign out (no details provided and patient thinks it was mechanical; no light headedness or blacking out; unsure about HT; no LOC or seizure-like activity; no confusion after fall)  - Patient was placed on BiPAP 16/8 FiO2 40% in ED  - For atrial fibrillation with RVR, patient was given IV Cardizem 10mg x3 doses followed by initiation of Cardizem drip  - He received IV Rocephin 1g x1 and Azithromycin 500mg x1 doses   - He also received 1L LR bolus in ED  - He will be admitted to MICU for further investigations, monitoring, and management       (23 Sep 2022 03:45)      OVERNIGHT EVENTS: Nurse noted that pts pigtail site was draining blood, s/p gauze placement. fellow made aware.  INTERVAL HPI: Patient seen and examined at bedside.  Patient has no complaints at this time. Denies fevers, chills, headache, lightheadedness, chest pain, dyspnea, abdominal pain, n/v/d/c.     T(F): 96.5 (09-27-22 @ 11:20), Max: 97 (09-27-22 @ 07:49)  HR: 93 (09-27-22 @ 11:20) (93 - 143)  BP: 111/68 (09-27-22 @ 11:20) (102/50 - 137/80)  RR: 20 (09-27-22 @ 11:20) (20 - 20)  SpO2: 98% (09-27-22 @ 11:20) (92% - 100%)  I&O's Summary    26 Sep 2022 07:01  -  27 Sep 2022 07:00  --------------------------------------------------------  IN: 0 mL / OUT: 300 mL / NET: -300 mL    PHYSICAL EXAM:  GENERAL: NAD, patient comfortable on 3L NC  HEAD:  Atraumatic, Normocephalic  EYES: EOMI, PERRLA, conjunctiva and sclera clear  ENMT: MMM  NECK: Supple, No JVD  NERVOUS SYSTEM:  Alert & Oriented X3  CHEST/LUNG: Clear to aucultation on the R.; decreased breath sounds on the left side; No rales, rhonchi, wheezing, or rubs; Bleeding at the pig tail sign covered with gauze.  HEART: Regular rate and rhythm; No murmurs, rubs, or gallops  ABDOMEN: Soft, Nontender, Nondistended; Bowel sounds present  EXTREMITIES:  2+ Peripheral Pulses, No clubbing, cyanosis, or edema    LABS:                        10.2   12.02 )-----------( 201      ( 27 Sep 2022 01:09 )             32.8     09-27    137  |  88<L>  |  42<H>  ----------------------------<  133<H>  4.5   |  43<HH>  |  0.8    Ca    8.3<L>      27 Sep 2022 01:09  Mg     2.3     09-27    TPro  5.5<L>  /  Alb  2.4<L>  /  TBili  0.3  /  DBili  x   /  AST  43<H>  /  ALT  35  /  AlkPhos  87  09-27        CAPILLARY BLOOD GLUCOSE          09-23 @ 23:30   No growth to date.  --  --          MEDICATIONS  (STANDING):  acetylcysteine 10%  Inhalation 4 milliLiter(s) Inhalation daily  aMIOdarone Infusion 1 mG/Min (33.3 mL/Hr) IV Continuous <Continuous>  aMIOdarone Infusion 0.5 mG/Min (16.7 mL/Hr) IV Continuous <Continuous>  atorvastatin 80 milliGRAM(s) Oral at bedtime  chlorhexidine 2% Cloths 1 Application(s) Topical <User Schedule>  enoxaparin Injectable 70 milliGRAM(s) SubCutaneous every 12 hours  methylPREDNISolone sodium succinate Injectable 60 milliGRAM(s) IV Push two times a day  metoprolol tartrate 100 milliGRAM(s) Oral two times a day  norepinephrine Infusion 0.03 MICROgram(s)/kG/Min (4.15 mL/Hr) IV Continuous <Continuous>  pantoprazole    Tablet 40 milliGRAM(s) Oral before breakfast  sodium chloride 3%  Inhalation 4 milliLiter(s) Inhalation every 12 hours    MEDICATIONS  (PRN):  acetaminophen     Tablet .. 650 milliGRAM(s) Oral every 6 hours PRN Temp greater or equal to 38C (100.4F), Mild Pain (1 - 3)  ALBUTerol    90 MICROgram(s) HFA Inhaler 2 Puff(s) Inhalation every 6 hours PRN Shortness of Breath  meclizine 25 milliGRAM(s) Oral every 8 hours PRN Dizziness

## 2022-09-27 NOTE — PROGRESS NOTE ADULT - ASSESSMENT
Mr. 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 Bedside swallow eval    PAF with RVR  Elevated Troponin: NSTEMI Likely Due to Demand Ischemia Secondary to Afib with RVR  - increase metoprolol to [id number]. Add cardizem if rate not controlled. D/C Digoxin  - c/w therapeutic Lovenox while hospitalized in case of procedures. Eliquis on dc  - EP eval appreciated, will finish the amnio drip and start amioderone 400 BID tomorrow    Mechanical Fall in ED  - per prior notes, no other details available   -Wound Care reccs      Code: DNR/DNI  Pending (specify):   clinical improvement, labs, tapering down supplemental 02, pleurx drainage PRN, rate control

## 2022-09-27 NOTE — PROGRESS NOTE ADULT - SUBJECTIVE AND OBJECTIVE BOX
KEIKO SALDAÑA  87y Male    CHIEF COMPLAINT:    Patient is a 87y old  Male who presents with a chief complaint of Hypoxic Respiratory Failure (27 Sep 2022 10:25)    INTERVAL HPI/OVERNIGHT EVENTS:    Patient seen and examined. No acute events overnight. HR better, comfortable on NC     ROS: All other systems are negative.    Vital Signs:    T(F): 96.5 (22 @ 11:20), Max: 97 (22 @ 07:49)  HR: 93 (22 @ 11:20) (93 - 143)  BP: 111/68 (22 @ 11:20) (102/50 - 137/80)  RR: 20 (22 @ 11:20) (20 - 20)  SpO2: 98% (22 @ 11:20) (94% - 100%)    26 Sep 2022 07:01  -  27 Sep 2022 07:00  --------------------------------------------------------  IN: 0 mL / OUT: 300 mL / NET: -300 mL    27 Sep 2022 07:01  -  27 Sep 2022 12:44  --------------------------------------------------------  IN: 580 mL / OUT: 200 mL / NET: 380 mL    Daily Weight in k.4 (27 Sep 2022 07:49)    PHYSICAL EXAM:    GENERAL:  NAD  SKIN: No rashes or lesions  HEENT: Atraumatic. Normocephalic.   NECK: Supple, No JVD.    PULMONARY: Absent breath sounds L . No wheezing. No rales  CVS: Normal S1, S2. Rate and Rhythm are regular   ABDOMEN/GI: Soft, Nontender, Nondistended   MSK: No clubbing or cyanosis   NEUROLOGIC: Moves all extremities   PSYCH: Awake and alert, confused at times     Consultant(s) Notes Reviewed:  [x ] YES  [ ] NO  Care Discussed with Consultants/Other Providers [ x] YES  [ ] NO    LABS:                        10.2   12.02 )-----------( 201      ( 27 Sep 2022 01:09 )             32.8     137  |  88<L>  |  42<H>  ----------------------------<  133<H>  4.5   |  43<HH>  |  0.8    Ca    8.3<L>      27 Sep 2022 01:09  Mg     2.3         TPro  5.5<L>  /  Alb  2.4<L>  /  TBili  0.3  /  DBili  x   /  AST  43<H>  /  ALT  35  /  AlkPhos  87      Serum Pro-Brain Natriuretic Peptide: 4131 pg/mL (22 @ 16:30)    RADIOLOGY & ADDITIONAL TESTS:  Imaging or report Personally Reviewed:  [x] YES  [ ] NO  EKG reviewed: [x] YES  [ ] NO    Medications:  Standing  acetylcysteine 10%  Inhalation 4 milliLiter(s) Inhalation daily  aMIOdarone Infusion 1 mG/Min IV Continuous <Continuous>  aMIOdarone Infusion 0.5 mG/Min IV Continuous <Continuous>  atorvastatin 80 milliGRAM(s) Oral at bedtime  chlorhexidine 2% Cloths 1 Application(s) Topical <User Schedule>  enoxaparin Injectable 70 milliGRAM(s) SubCutaneous every 12 hours  methylPREDNISolone sodium succinate Injectable 60 milliGRAM(s) IV Push two times a day  metoprolol tartrate 100 milliGRAM(s) Oral two times a day  norepinephrine Infusion 0.03 MICROgram(s)/kG/Min IV Continuous <Continuous>  pantoprazole    Tablet 40 milliGRAM(s) Oral before breakfast  sodium chloride 3%  Inhalation 4 milliLiter(s) Inhalation every 12 hours    PRN Meds  acetaminophen     Tablet .. 650 milliGRAM(s) Oral every 6 hours PRN  ALBUTerol    90 MICROgram(s) HFA Inhaler 2 Puff(s) Inhalation every 6 hours PRN  meclizine 25 milliGRAM(s) Oral every 8 hours PRN

## 2022-09-27 NOTE — CHART NOTE - NSCHARTNOTEFT_GEN_A_CORE
3- Day Calorie Count (9/24-9/26)     Day 1: On BiPAP per documentation for breakfast/lunch?? 150kcal/7g PRO for Dinner     Day 2: 813kcal/30.8g PRO (453kcal/20.8g PRO from [employer] HP)     Day 3: No documentation     **Unable to calculate 3-day average due to limited documentation.  Pt followed by [facility], please refer to their notes for recommendations.

## 2022-09-28 NOTE — PROGRESS NOTE ADULT - ASSESSMENT
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   Hospice follow up.     Paroxysmal Atrial Fibrillation with Rapid Ventricular Response:   Elevated Troponin: NSTEMI Likely Due to Demand Ischemia Secondary to Afib with RVR  increased metoprolol to [id number]  s/p Amiodarone infusion, continue Amiodarone 400mg BID, switch to Amiodarone 200mg daily, Digoxin held  Eliquis on hold, can be resumed.     Mechanical Fall in ED  PT evaluation, fall precaution.     Elevated Troponin: NSTEMI Likely Due to Demand Ischemia Secondary to Afib with Rapid Ventricular ResponseDNR/DNI    #Progress Note Handoff  Pending (specify): improving SOB, placement.   Family discussion: Plan of care discussed with patient's son Jack at bedside, all questions answered  Disposition: SNF with hospice vs Home.

## 2022-09-28 NOTE — PROGRESS NOTE ADULT - ASSESSMENT
IMPRESSION:    Acute on chronic hypoxic respiratory failure improved   Chronic hypercapnic respiratory failure  MARILEE NSCLC   Large left pleural effusion SP IPC.   Complete atelectasis left lung   HO paroxysmal afib.  Now RVR   HO COPD not in acute exacerbation    PLAN:    CNS: Avoid CNS depressants.    HEENT: Oral care    PULMONARY:  HOB @ 45 degrees. Encourage NIV 4 hr on off and during sleep. Pleurx drained and dressed.  no bleedign at this time     CARDIOVASCULAR:  C/w rate control. Avoid overload.     GI: GI prophylaxis.  Feeding    RENAL:  Follow up lytes.  Correct as needed    HEMATOLOGICAL:  DVT prophylaxis.  Hold full AC for now.  MOnitor CBC and coags      ENDOCRINE:  Follow up FS.  Insulin protocol if needed.    MUSCULOSKELETAL:  OOB with PT OT     Prognosis overall poor

## 2022-09-28 NOTE — PROGRESS NOTE ADULT - SUBJECTIVE AND OBJECTIVE BOX
Patient is a 87y old  Male who presents with a chief complaint of Hypoxic Respiratory Failure (27 Sep 2022 12:38)        Over Night Events:  on NC o2.  Off pressors.  Some bleeding from PleurX site         ROS:     All ROS are negative except HPI         PHYSICAL EXAM    ICU Vital Signs Last 24 Hrs  T(C): 36.6 (28 Sep 2022 06:00), Max: 36.6 (28 Sep 2022 06:00)  T(F): 97.8 (28 Sep 2022 06:00), Max: 97.8 (28 Sep 2022 06:00)  HR: 124 (28 Sep 2022 06:00) (86 - 124)  BP: 112/70 (28 Sep 2022 06:00) (93/60 - 122/60)  BP(mean): --  ABP: --  ABP(mean): --  RR: 20 (28 Sep 2022 04:00) (20 - 20)  SpO2: 97% (27 Sep 2022 18:11) (97% - 98%)    O2 Parameters below as of 27 Sep 2022 18:11  Patient On (Oxygen Delivery Method): nasal cannula  O2 Flow (L/min): 3          CONSTITUTIONAL:  In NAD    ENT:   Airway patent,   Mouth with normal mucosa.       EYES:   Pupils equal,   Round and reactive to light.    CARDIAC:   Normal rate,   Regular rhythm.        RESPIRATORY:   No wheezing  NO BS left lung   Normal chest expansion  Not tachypneic,  No use of accessory muscles    GASTROINTESTINAL:  Abdomen soft,   Non-tender,   No guarding,   + BS    MUSCULOSKELETAL:   Range of motion is not limited,  No clubbing, cyanosis    NEUROLOGICAL:   Alert   No motor  deficits.    SKIN:   Skin normal color for race,    No evidence of rash.    PSYCHIATRIC:   No apparent risk to self or others.          09-27-22 @ 07:01  -  09-28-22 @ 07:00  --------------------------------------------------------  IN:    Oral Fluid: 580 mL  Total IN: 580 mL    OUT:    Voided (mL): 500 mL  Total OUT: 500 mL    Total NET: 80 mL          LABS:                            10.2   11.10 )-----------( 198      ( 28 Sep 2022 02:11 )             32.1                                               09-28    136  |  87<L>  |  43<H>  ----------------------------<  113<H>  5.4<H>   |  45<HH>  |  0.8    Ca    8.0<L>      28 Sep 2022 02:11  Mg     2.3     09-28    TPro  5.4<L>  /  Alb  2.4<L>  /  TBili  0.4  /  DBili  x   /  AST  38  /  ALT  36  /  AlkPhos  80  09-28                                                                                           LIVER FUNCTIONS - ( 28 Sep 2022 02:11 )  Alb: 2.4 g/dL / Pro: 5.4 g/dL / ALK PHOS: 80 U/L / ALT: 36 U/L / AST: 38 U/L / GGT: x                                                                                                                                       MEDICATIONS  (STANDING):  acetylcysteine 10%  Inhalation 4 milliLiter(s) Inhalation daily  ALBUTerol    0.083% 2.5 milliGRAM(s) Nebulizer every 6 hours  aMIOdarone    Tablet 400 milliGRAM(s) Oral every 12 hours  aMIOdarone Infusion 1 mG/Min (33.3 mL/Hr) IV Continuous <Continuous>  aMIOdarone Infusion 0.5 mG/Min (16.7 mL/Hr) IV Continuous <Continuous>  atorvastatin 80 milliGRAM(s) Oral at bedtime  chlorhexidine 2% Cloths 1 Application(s) Topical <User Schedule>  enoxaparin Injectable 70 milliGRAM(s) SubCutaneous every 12 hours  methylPREDNISolone sodium succinate Injectable 60 milliGRAM(s) IV Push two times a day  metoprolol tartrate 100 milliGRAM(s) Oral two times a day  pantoprazole    Tablet 40 milliGRAM(s) Oral before breakfast  sodium chloride 3%  Inhalation 4 milliLiter(s) Inhalation every 12 hours    MEDICATIONS  (PRN):  acetaminophen     Tablet .. 650 milliGRAM(s) Oral every 6 hours PRN Temp greater or equal to 38C (100.4F), Mild Pain (1 - 3)  meclizine 25 milliGRAM(s) Oral every 8 hours PRN Dizziness      New X-rays reviewed:                                                                                  ECHO

## 2022-09-28 NOTE — PROGRESS NOTE ADULT - SUBJECTIVE AND OBJECTIVE BOX
KEIKO SALDAÑA  87y  Male      Patient is a 87y old  Male who presents with a chief complaint of Hypoxic Respiratory Failure (28 Sep 2022 07:38)      INTERVAL HPI/OVERNIGHT EVENTS:  He feels ok, mild SOB, no chest pain.   Vital Signs Last 24 Hrs  T(C): 36.4 (28 Sep 2022 14:37), Max: 36.6 (28 Sep 2022 06:00)  T(F): 97.5 (28 Sep 2022 14:37), Max: 97.8 (28 Sep 2022 06:00)  HR: 90 (28 Sep 2022 14:37) (86 - 124)  BP: 111/70 (28 Sep 2022 14:37) (98/55 - 122/60)  BP(mean): --  RR: 18 (28 Sep 2022 14:37) (18 - 20)  SpO2: 97% (27 Sep 2022 18:11) (97% - 97%)    Parameters below as of 27 Sep 2022 18:11  Patient On (Oxygen Delivery Method): nasal cannula  O2 Flow (L/min): 3        09-27-22 @ 07:01  -  09-28-22 @ 07:00  --------------------------------------------------------  IN: 580 mL / OUT: 500 mL / NET: 80 mL    09-28-22 @ 07:01  -  09-28-22 @ 17:17  --------------------------------------------------------  IN: 354 mL / OUT: 200 mL / NET: 154 mL            Consultant(s) Notes Reviewed:  [x ] YES  [ ] NO          MEDICATIONS  (STANDING):  acetylcysteine 10%  Inhalation 4 milliLiter(s) Inhalation daily  ALBUTerol    0.083% 2.5 milliGRAM(s) Nebulizer every 6 hours  aMIOdarone    Tablet 400 milliGRAM(s) Oral every 12 hours  aMIOdarone Infusion 1 mG/Min (33.3 mL/Hr) IV Continuous <Continuous>  aMIOdarone Infusion 0.5 mG/Min (16.7 mL/Hr) IV Continuous <Continuous>  atorvastatin 80 milliGRAM(s) Oral at bedtime  chlorhexidine 2% Cloths 1 Application(s) Topical <User Schedule>  enoxaparin Injectable 40 milliGRAM(s) SubCutaneous every 24 hours  metoprolol tartrate 100 milliGRAM(s) Oral two times a day  pantoprazole    Tablet 40 milliGRAM(s) Oral before breakfast  sodium chloride 3%  Inhalation 4 milliLiter(s) Inhalation every 12 hours    MEDICATIONS  (PRN):  acetaminophen     Tablet .. 650 milliGRAM(s) Oral every 6 hours PRN Temp greater or equal to 38C (100.4F), Mild Pain (1 - 3)  meclizine 25 milliGRAM(s) Oral every 8 hours PRN Dizziness      LABS                          10.2   11.10 )-----------( 198      ( 28 Sep 2022 02:11 )             32.1     09-28    136  |  87<L>  |  43<H>  ----------------------------<  113<H>  5.4<H>   |  45<HH>  |  0.8    Ca    8.0<L>      28 Sep 2022 02:11  Mg     2.3     09-28    TPro  5.4<L>  /  Alb  2.4<L>  /  TBili  0.4  /  DBili  x   /  AST  38  /  ALT  36  /  AlkPhos  80  09-28          Lactate Trend        CAPILLARY BLOOD GLUCOSE          Culture - Blood (collected 09-23-22 @ 23:30)  Source: .Blood Blood-Peripheral  Preliminary Report (09-25-22 @ 07:02):    No growth to date.    Culture - Blood (collected 09-23-22 @ 07:40)  Source: .Blood Blood  Preliminary Report (09-24-22 @ 19:01):    No growth to date.        RADIOLOGY & ADDITIONAL TESTS:    Imaging Personally Reviewed:  [ ] YES  [ ] NO    HEALTH ISSUES - PROBLEM Dx:          PHYSICAL EXAM:  GENERAL: NAD, well-developed. Hear hearing.   HEAD:  Atraumatic, Normocephalic.  EYES: EOMI, PERRLA, conjunctiva and sclera clear.  NECK: Supple, No JVD.  CHEST/LUNG: decrease breath sounds on left lower lung, pleurx cath in place.   HEART: Irregular rate and rhythm; S1 S2.   ABDOMEN: Soft, Nontender, Nondistended; Bowel sounds present.  EXTREMITIES:  2+ Peripheral Pulses, leg edema 1+  PSYCH: AAOx3.  NEUROLOGY: non-focal.  SKIN: No rashes or lesions.

## 2022-09-29 NOTE — PROGRESS NOTE ADULT - SUBJECTIVE AND OBJECTIVE BOX
KEIKO SALDAÑA 87y Male  MRN#: [id number]   Hospital Day: 6d    SUBJECTIVE  Patient is a 87y old Male who presents with a chief complaint of Hypoxic Respiratory Failure (29 Sep 2022 15:43)  Currently admitted to medicine with the primary diagnosis of Pleural effusion      INTERVAL HPI AND OVERNIGHT EVENTS:  Patient was examined and seen at bedside. This morning he is resting comfortably in bed. No issues or overnight events.    OBJECTIVE  PAST MEDICAL & SURGICAL HISTORY  Vertigo    HTN (hypertension)    HLD (hyperlipidemia)    History of CEA (carotid endarterectomy)    S/P cataract surgery    H/O umbilical hernia repair      ALLERGIES:  No Known Allergies    MEDICATIONS:  STANDING MEDICATIONS  acetylcysteine 10%  Inhalation 4 milliLiter(s) Inhalation daily  ALBUTerol    0.083% 2.5 milliGRAM(s) Nebulizer every 6 hours  aMIOdarone    Tablet 400 milliGRAM(s) Oral every 12 hours  aMIOdarone Infusion 1 mG/Min IV Continuous <Continuous>  aMIOdarone Infusion 0.5 mG/Min IV Continuous <Continuous>  atorvastatin 80 milliGRAM(s) Oral at bedtime  chlorhexidine 2% Cloths 1 Application(s) Topical <User Schedule>  enoxaparin Injectable 40 milliGRAM(s) SubCutaneous every 24 hours  metoprolol tartrate 100 milliGRAM(s) Oral two times a day  pantoprazole    Tablet 40 milliGRAM(s) Oral before breakfast  predniSONE   Tablet 60 milliGRAM(s) Oral once  sodium chloride 3%  Inhalation 4 milliLiter(s) Inhalation every 12 hours    PRN MEDICATIONS  acetaminophen     Tablet .. 650 milliGRAM(s) Oral every 6 hours PRN  meclizine 25 milliGRAM(s) Oral every 8 hours PRN      VITAL SIGNS: Last 24 Hours  T(C): 36.4 (29 Sep 2022 13:54), Max: 36.4 (28 Sep 2022 21:04)  T(F): 97.5 (29 Sep 2022 13:54), Max: 97.5 (28 Sep 2022 21:04)  HR: 110 (29 Sep 2022 13:54) (89 - 115)  BP: 113/68 (29 Sep 2022 13:54) (93/67 - 113/68)  BP(mean): --  RR: 18 (29 Sep 2022 13:54) (18 - 18)  SpO2: --    LABS:                        9.6    14.49 )-----------( 201      ( 29 Sep 2022 07:09 )             30.1     09-29    138  |  91<L>  |  50<H>  ----------------------------<  95  4.7   |  37<H>  |  1.0    Ca    8.3<L>      29 Sep 2022 07:09  Mg     2.3     09-29    TPro  5.5<L>  /  Alb  2.7<L>  /  TBili  0.6  /  DBili  x   /  AST  56<H>  /  ALT  52<H>  /  AlkPhos  85  09-29                  RADIOLOGY:      PHYSICAL EXAM:  CONSTITUTIONAL: No acute distress, well-developed, well-groomed, AAOx3  HEAD: Atraumatic, normocephalic  EYES: EOM intact, PERRLA, conjunctiva and sclera clear  ENT: Supple, no masses, no thyromegaly, no bruits, no JVD; moist mucous membranes  PULMONARY: Clear to auscultation bilaterally; no wheezes, rales, or rhonchi  CARDIOVASCULAR: Regular rate and rhythm; no murmurs, rubs, or gallops  GASTROINTESTINAL: Soft, non-tender, non-distended; bowel sounds present  MUSCULOSKELETAL: 2+ peripheral pulses; no clubbing, no cyanosis, no edema  NEUROLOGY: non-focal  SKIN: No rashes or lesions; warm and dry   KEIKO SALDAÑA 87y Male  MRN#: [id number]   Hospital Day: 6d    SUBJECTIVE  Patient is a 87y old Male who presents with a chief complaint of Hypoxic Respiratory Failure (29 Sep 2022 15:43)  Currently admitted to medicine with the primary diagnosis of Pleural effusion      INTERVAL HPI AND OVERNIGHT EVENTS:  Patient was examined and seen at bedside. This morning he is resting comfortably in bed. No issues or overnight events.    OBJECTIVE  PAST MEDICAL & SURGICAL HISTORY  Vertigo    HTN (hypertension)    HLD (hyperlipidemia)    History of CEA (carotid endarterectomy)    S/P cataract surgery    H/O umbilical hernia repair      ALLERGIES:  No Known Allergies    MEDICATIONS:  STANDING MEDICATIONS  acetylcysteine 10%  Inhalation 4 milliLiter(s) Inhalation daily  ALBUTerol    0.083% 2.5 milliGRAM(s) Nebulizer every 6 hours  aMIOdarone    Tablet 400 milliGRAM(s) Oral every 12 hours  aMIOdarone Infusion 1 mG/Min IV Continuous <Continuous>  aMIOdarone Infusion 0.5 mG/Min IV Continuous <Continuous>  atorvastatin 80 milliGRAM(s) Oral at bedtime  chlorhexidine 2% Cloths 1 Application(s) Topical <User Schedule>  enoxaparin Injectable 40 milliGRAM(s) SubCutaneous every 24 hours  metoprolol tartrate 100 milliGRAM(s) Oral two times a day  pantoprazole    Tablet 40 milliGRAM(s) Oral before breakfast  predniSONE   Tablet 60 milliGRAM(s) Oral once  sodium chloride 3%  Inhalation 4 milliLiter(s) Inhalation every 12 hours    PRN MEDICATIONS  acetaminophen     Tablet .. 650 milliGRAM(s) Oral every 6 hours PRN  meclizine 25 milliGRAM(s) Oral every 8 hours PRN      VITAL SIGNS: Last 24 Hours  T(C): 36.4 (29 Sep 2022 13:54), Max: 36.4 (28 Sep 2022 21:04)  T(F): 97.5 (29 Sep 2022 13:54), Max: 97.5 (28 Sep 2022 21:04)  HR: 110 (29 Sep 2022 13:54) (89 - 115)  BP: 113/68 (29 Sep 2022 13:54) (93/67 - 113/68)  BP(mean): --  RR: 18 (29 Sep 2022 13:54) (18 - 18)  SpO2: --    LABS:                        9.6    14.49 )-----------( 201      ( 29 Sep 2022 07:09 )             30.1     09-29    138  |  91<L>  |  50<H>  ----------------------------<  95  4.7   |  37<H>  |  1.0    Ca    8.3<L>      29 Sep 2022 07:09  Mg     2.3     09-29    TPro  5.5<L>  /  Alb  2.7<L>  /  TBili  0.6  /  DBili  x   /  AST  56<H>  /  ALT  52<H>  /  AlkPhos  85  09-29                  RADIOLOGY:      PHYSICAL EXAM:  CONSTITUTIONAL: No acute distress, AAOx2, slightly disoriented at times  HEAD: Atraumatic, normocephalic  EYES: EOM intact, PERRLA, conjunctiva and sclera clear  ENT: Supple, no masses, no thyromegaly, no bruits, no JVD; moist mucous membranes  PULMONARY: Decrease BS on L side, no wheezes, rales, or rhonchi  CARDIOVASCULAR: Tahcycardic,  no murmurs, rubs, or gallops  GASTROINTESTINAL: Soft, non-tender, non-distended; bowel sounds present  MUSCULOSKELETAL: 2+ peripheral pulses; no clubbing, no cyanosis, +2 edema   NEUROLOGY: non-focal  SKIN: No rashes or lesions; warm and dry

## 2022-09-29 NOTE — PROGRESS NOTE ADULT - SUBJECTIVE AND OBJECTIVE BOX
Patient is a 87y old  Male who presents with a chief complaint of Hypoxic Respiratory Failure (28 Sep 2022 17:11)        Over Night Events:  Remains on NC O2.  No overt bleeding at the IPC site         ROS:     All ROS are negative except HPI         PHYSICAL EXAM    ICU Vital Signs Last 24 Hrs  T(C): 36.4 (29 Sep 2022 05:00), Max: 36.4 (28 Sep 2022 14:37)  T(F): 97.5 (29 Sep 2022 05:00), Max: 97.5 (28 Sep 2022 14:37)  HR: 113 (29 Sep 2022 07:02) (89 - 115)  BP: 104/69 (29 Sep 2022 07:02) (93/67 - 111/70)  BP(mean): --  ABP: --  ABP(mean): --  RR: 18 (29 Sep 2022 05:00) (18 - 18)  SpO2: --        CONSTITUTIONAL:  In  NAD    ENT:   Airway patent,   Mouth with normal mucosa.       EYES:   Pupils equal,   Round and reactive to light.    CARDIAC:   Normal rate,   Regular rhythm.      RESPIRATORY:   No wheezing  NO BS left lung  Normal chest expansion  Not tachypneic,  No use of accessory muscles    GASTROINTESTINAL:  Abdomen soft,   Non-tender,   No guarding,   + BS    MUSCULOSKELETAL:   Range of motion is not limited,  No clubbing, cyanosis    NEUROLOGICAL:   Alert   No motor  deficits.    SKIN:   Skin normal color for race,   No evidence of rash.        09-28-22 @ 07:01  -  09-29-22 @ 07:00  --------------------------------------------------------  IN:    Oral Fluid: 354 mL  Total IN: 354 mL    OUT:    Voided (mL): 200 mL  Total OUT: 200 mL    Total NET: 154 mL          LABS:                            10.2   11.10 )-----------( 198      ( 28 Sep 2022 02:11 )             32.1                                               09-28    136  |  87<L>  |  43<H>  ----------------------------<  113<H>  5.4<H>   |  45<HH>  |  0.8    Ca    8.0<L>      28 Sep 2022 02:11  Mg     2.3     09-28    TPro  5.4<L>  /  Alb  2.4<L>  /  TBili  0.4  /  DBili  x   /  AST  38  /  ALT  36  /  AlkPhos  80  09-28                                                                                           LIVER FUNCTIONS - ( 28 Sep 2022 02:11 )  Alb: 2.4 g/dL / Pro: 5.4 g/dL / ALK PHOS: 80 U/L / ALT: 36 U/L / AST: 38 U/L / GGT: x                                                                                                                                       MEDICATIONS  (STANDING):  acetylcysteine 10%  Inhalation 4 milliLiter(s) Inhalation daily  ALBUTerol    0.083% 2.5 milliGRAM(s) Nebulizer every 6 hours  aMIOdarone    Tablet 400 milliGRAM(s) Oral every 12 hours  aMIOdarone Infusion 1 mG/Min (33.3 mL/Hr) IV Continuous <Continuous>  aMIOdarone Infusion 0.5 mG/Min (16.7 mL/Hr) IV Continuous <Continuous>  atorvastatin 80 milliGRAM(s) Oral at bedtime  chlorhexidine 2% Cloths 1 Application(s) Topical <User Schedule>  enoxaparin Injectable 40 milliGRAM(s) SubCutaneous every 24 hours  metoprolol tartrate 100 milliGRAM(s) Oral two times a day  pantoprazole    Tablet 40 milliGRAM(s) Oral before breakfast  predniSONE   Tablet 60 milliGRAM(s) Oral once  sodium chloride 3%  Inhalation 4 milliLiter(s) Inhalation every 12 hours    MEDICATIONS  (PRN):  acetaminophen     Tablet .. 650 milliGRAM(s) Oral every 6 hours PRN Temp greater or equal to 38C (100.4F), Mild Pain (1 - 3)  meclizine 25 milliGRAM(s) Oral every 8 hours PRN Dizziness      New X-rays reviewed:                                                                                  ECHO    CXR interpreted by me:

## 2022-09-29 NOTE — PROGRESS NOTE ADULT - SUBJECTIVE AND OBJECTIVE BOX
KEIKO SALDAÑA  87y  Male      Patient is a 87y old  Male who presents with a chief complaint of Hypoxic Respiratory Failure (29 Sep 2022 07:06)      INTERVAL HPI/OVERNIGHT EVENTS:  He was confused and delirious, more tachypneic today.  Vital Signs Last 24 Hrs  T(C): 36.4 (29 Sep 2022 13:54), Max: 36.4 (28 Sep 2022 21:04)  T(F): 97.5 (29 Sep 2022 13:54), Max: 97.5 (28 Sep 2022 21:04)  HR: 110 (29 Sep 2022 13:54) (89 - 115)  BP: 113/68 (29 Sep 2022 13:54) (93/67 - 113/68)  BP(mean): --  RR: 18 (29 Sep 2022 13:54) (18 - 18)  SpO2: --          09-28-22 @ 07:01  -  09-29-22 @ 07:00  --------------------------------------------------------  IN: 354 mL / OUT: 200 mL / NET: 154 mL    09-29-22 @ 07:01  -  09-29-22 @ 15:49  --------------------------------------------------------  IN: 0 mL / OUT: 600 mL / NET: -600 mL            Consultant(s) Notes Reviewed:  [x ] YES  [ ] NO          MEDICATIONS  (STANDING):  acetylcysteine 10%  Inhalation 4 milliLiter(s) Inhalation daily  ALBUTerol    0.083% 2.5 milliGRAM(s) Nebulizer every 6 hours  aMIOdarone    Tablet 400 milliGRAM(s) Oral every 12 hours  aMIOdarone Infusion 1 mG/Min (33.3 mL/Hr) IV Continuous <Continuous>  aMIOdarone Infusion 0.5 mG/Min (16.7 mL/Hr) IV Continuous <Continuous>  atorvastatin 80 milliGRAM(s) Oral at bedtime  chlorhexidine 2% Cloths 1 Application(s) Topical <User Schedule>  enoxaparin Injectable 40 milliGRAM(s) SubCutaneous every 24 hours  metoprolol tartrate 100 milliGRAM(s) Oral two times a day  pantoprazole    Tablet 40 milliGRAM(s) Oral before breakfast  predniSONE   Tablet 60 milliGRAM(s) Oral once  sodium chloride 3%  Inhalation 4 milliLiter(s) Inhalation every 12 hours    MEDICATIONS  (PRN):  acetaminophen     Tablet .. 650 milliGRAM(s) Oral every 6 hours PRN Temp greater or equal to 38C (100.4F), Mild Pain (1 - 3)  meclizine 25 milliGRAM(s) Oral every 8 hours PRN Dizziness      LABS                          9.6    14.49 )-----------( 201      ( 29 Sep 2022 07:09 )             30.1     09-29    138  |  91<L>  |  50<H>  ----------------------------<  95  4.7   |  37<H>  |  1.0    Ca    8.3<L>      29 Sep 2022 07:09  Mg     2.3     09-29    TPro  5.5<L>  /  Alb  2.7<L>  /  TBili  0.6  /  DBili  x   /  AST  56<H>  /  ALT  52<H>  /  AlkPhos  85  09-29          Lactate Trend        CAPILLARY BLOOD GLUCOSE          Culture - Blood (collected 09-23-22 @ 23:30)  Source: .Blood Blood-Peripheral  Final Report (09-29-22 @ 07:01):    No Growth Final    Culture - Blood (collected 09-23-22 @ 07:40)  Source: .Blood Blood  Final Report (09-28-22 @ 19:00):    No Growth Final        RADIOLOGY & ADDITIONAL TESTS:    Imaging Personally Reviewed:  [ ] YES  [ ] NO    HEALTH ISSUES - PROBLEM Dx:          PHYSICAL EXAM:  GENERAL: NAD, well-developed. Hear hearing.   HEAD:  Atraumatic, Normocephalic.  EYES: EOMI, PERRLA, conjunctiva and sclera clear.  NECK: Supple, No JVD.  CHEST/LUNG: decrease breath sounds on left lower lung, pleurx cath in place.   HEART: Irregular rate and rhythm; S1 S2.   ABDOMEN: Soft, Nontender, Nondistended; Bowel sounds present.  EXTREMITIES:  2+ Peripheral Pulses, leg edema 1+  PSYCH: AAOx3.  NEUROLOGY: non-focal.  SKIN: No rashes or lesions.

## 2022-09-29 NOTE — PROGRESS NOTE ADULT - ASSESSMENT
IMPRESSION:    Acute on chronic hypoxic respiratory failure improved   Chronic hypercapnic respiratory failure  MARILEE NSCLC   Large left pleural effusion SP IPC.   Some bleeding at the exit site of the IPC resolved.    Complete atelectasis left lung   HO paroxysmal afib.  Now RVR   HO COPD not in acute exacerbation    PLAN:    CNS: Avoid CNS depressants.    HEENT: Oral care    PULMONARY:  HOB @ 45 degrees. Wean o2 as tolerated.  Encourage NIV use during sleep.  Pulmonary toilet.  IPC drainage in am      CARDIOVASCULAR:  C/w rate control. Avoid overload.     GI: GI prophylaxis.  Feeding    RENAL:  Follow up lytes.  Correct as needed    HEMATOLOGICAL:  DVT prophylaxis.  Continue holding full AC for now.  Dressing change in am.  If no further bleeding, can resume full AC in am.  Monitor CBC and coags      ENDOCRINE:  Follow up FS.  Insulin protocol if needed.    MUSCULOSKELETAL:  OOB with PT OT     Prognosis overall poor

## 2022-09-29 NOTE — PROGRESS NOTE ADULT - ASSESSMENT
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   - Procal 0.06 and blood cx negative monitor off antibiotics  - Respitory status improved, although overall prognosis remains very poor  - possible hospice. Family requesting physical therapy facility prior to hospice facility    #Paroxysmal Atrial Fibrillation with RVR  #NSTEMI Likely Demand Ischemia  - increased metoprolol to [id number]  - s/p Amiodarone infusion, continue Amiodarone 400mg BID, switch to Amiodarone 200mg daily,   - Digoxin held Eliquis on hold, can be resumed     Mechanical Fall in ED  PT evaluation, fall precaution.    87 year old (ex-smoker), history in recently diagnosed NSCLC with large L pleural effusion s/p pleurodesis 8/26/2022 on 3L home o2, PAD, HTN, pAF on eliquis, s/p CEA brought in by son for worsening SOB and increasing oxygen requirements at home up to 5L, initially admitted to MICU, now in sdu      #Acute Hypoxic Hypercapnic Respiratory Failure - improving   #Large Left Pleural Effusion and Trace Right Pleural Effusion with History of Pleurodesis on 08/26/2022   # Non Small Cell Lung Cancer On Home O2 (3LPM NC)  #complete opacification of L thorax  #Suspected History of COPD in Setting of Smoking History  - s/p pleurx placement 9/23 with 2l removed 9/23, on home O2 requirements    - s/p Solu-Medrol IV currently on PO Prednisone taper dose.   - Procal 0.06 and blood cx negative monitor off antibiotics  - f/u pulm for further IPC drainage   - Respiratory status improved, although overall prognosis remains very poor  - possible hospice. Family requesting physical therapy facility prior to hospice facility    #Paroxysmal Atrial Fibrillation with RVR  #NSTEMI Likely Demand Ischemia  #HTN  - increased metoprolol to [id number]  - s/p Amiodarone infusion, continue Amiodarone 400mg BID. Plan to switch to Amiodarone 200mg daily  - Digoxin and Eliquis were previously held, plan to resume after GOC conversation     #Mechanical Fall  - PT evaluation  - fall precautions     #Misc  - DVT Prophylaxis: lovenox   - Diet: DASH, soft and bite sized   - Activity: IAt  - Code Status: DNR/DNI

## 2022-09-29 NOTE — PROGRESS NOTE ADULT - ASSESSMENT
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   Hospice follow up.     Paroxysmal Atrial Fibrillation with Rapid Ventricular Response:   Elevated Troponin: NSTEMI Likely Due to Demand Ischemia Secondary to Afib with RVR  increased metoprolol to [id number]  s/p Amiodarone infusion, continue Amiodarone 400mg BID, switch to Amiodarone 200mg daily, Digoxin held  Eliquis on hold, can be resumed.     Mechanical Fall in ED  PT evaluation, fall precaution.     Elevated Troponin: NSTEMI Likely Due to Demand Ischemia Secondary to Afib with Rapid Ventricular Response  DNR/DNI    #Progress Note Handoff  Pending (specify): improving SOB, placement.   Family discussion: Plan of care discussed with patient's son Jack at bedside, all questions answered  Disposition: SNF with hospice vs STR vs Home.

## 2022-09-30 NOTE — CHART NOTE - NSCHARTNOTEFT_GEN_A_CORE
PleurX drained by bedside, 600cc of serosangunious fluid removed, patient tolerated, no complications.

## 2022-09-30 NOTE — PHYSICAL THERAPY INITIAL EVALUATION ADULT - BED MOBILITY TRAINING, PT EVAL
Pt will participate in supine to sit and reverse using side rails with Max A by discharge to facilitate return to PLOF.

## 2022-09-30 NOTE — DISCHARGE NOTE PROVIDER - HOSPITAL COURSE
Mr. Escoebdo is an 87 year old (ex-smoker) male patient known to have:  - Baseline AOx3; lives with son  - History of MARILEE Non Small Cell Lung Cancer and Large Pleural Effusion s/p Pleurodesis 08/26/2022. On Home O2 (3LPM NC). Follows with Dr Fletcher  - Right Carotid Artery Stenosis s/p CEA in 2016  - Peripheral Artery Disease  - Hypertension  - Paroxysmal Atrial Fibrillation. Forgot name of cardiologist. On eliquis 5mg BID  - Vertigo  - History of cataract surgery  - History of umbilical hernia repair    He was brought to the ED on 09/22 for evaluation of worsening SOB.  History goes back to few days PTP when the patient started complaining of worsening SOB on minimal exertion and at rest on 3LPM home O2.  This has been associated with leg swelling and orthopnea in the absence of new cough, runny nose, sore throat, chest pain, palpitations, diaphoresis, or light headedness.     CT Angio Chest PE Protocol w/ IV Cont (09.23.22 @ 02:07) No evidence of pulmonary embolus Large left pleural effusion with consolidative volume loss of the left lung. This is associated with prevascular suboptimally imaged mass which contributes to narrowing and partial opacification of the left mainstem  bronchus. No definite lymphadenopathy by size criteria. Consolidative process in the right lower lobe may be secondary to  atelectasis as well as an infectious process.    - Patient had a mechanical fall in ED per sign out (no details provided and patient thinks it was mechanical; no light headedness or blacking out; unsure about HT; no LOC or seizure-like activity; no confusion after fall)  - Patient was placed on BiPAP 16/8 FiO2 40% in ED  - For atrial fibrillation with RVR, patient was given IV Cardizem 10mg x3 doses followed by initiation of Cardizem drip  - He received IV Rocephin 1g x1 and Azithromycin 500mg x1 doses   - He also received 1L LR bolus in ED    Pt underwent pleural drainage with 2L of fluid drained. Pt was also started on Rocephin and Azithromycin per ID. Metoprolol was used for HR control.    Was transferred to the CEU for Acute Hypoxic Hypercapnic Respiratory Failure 2/2 Large Left Pleural Effusion and Trace Right Pleural Effusion.   Pt underwent a rapid response, It was found that he has left lung cavity was completely filled with pleural effusion despite 2L removal. patient went for pleurx placement 9/23 with 2l removed 9/23, on home O2 requirements  Was started on Solu-Medrol IV and was tapered on PO Prednisone. Procal 0.06 and blood cx negative monitor off antibiotics. Respiratory status improved, although overall prognosis remains very poor given Lung Cancer.   Possible hospice placement. Family requesting physical therapy facility prior to hospice facility. On 09/30: PleurX drained by bedside, 600cc of serosangunious fluid removed, patient tolerated, no complications. Plan to continue to drain 500cc fluid every other day upon DC.     For afib with RVR, patient was evaluated by EP. Digoxin was held. Started on amiodarone.  s/p Amiodarone infusion, continue Amiodarone 400mg BID. Plan to switch to Amiodarone 200mg daily. Additionally increased metoprolol to [id number]. Eliquis was previously held for thoracentesis but then resumed.     Patient is medically stable and ready for discharge.

## 2022-09-30 NOTE — CHART NOTE - NSCHARTNOTESELECT_GEN_ALL_CORE
TELE/Transfer Note
Transfer Note
Calorie Count/Event Note
PleurX placement/Event Note
Pulmonary/Event Note
RD limited note/Event Note
Rapid Response/Event Note

## 2022-09-30 NOTE — PHYSICAL THERAPY INITIAL EVALUATION ADULT - LEVEL OF INDEPENDENCE: CHAIR TO BED, REHAB EVAL
2/2 generalized weakness/ decrease activity tolerance, recommended Rachel lift A X 2/unable to perform

## 2022-09-30 NOTE — PROGRESS NOTE ADULT - SUBJECTIVE AND OBJECTIVE BOX
BEBO SALDAÑACATRACHO  87y  Male      Patient is a 87y old  Male who presents with a chief complaint of Hypoxic Respiratory Failure (30 Sep 2022 15:43)      INTERVAL HPI/OVERNIGHT EVENTS:  He feels ok, more awake today, still confused.   Vital Signs Last 24 Hrs  T(C): 36.2 (30 Sep 2022 14:06), Max: 36.9 (30 Sep 2022 05:16)  T(F): 97.1 (30 Sep 2022 14:06), Max: 98.4 (30 Sep 2022 05:16)  HR: 111 (30 Sep 2022 14:06) (85 - 111)  BP: 102/58 (30 Sep 2022 14:06) (97/69 - 171/70)  BP(mean): --  RR: 18 (30 Sep 2022 14:06) (18 - 18)  SpO2: 96% (30 Sep 2022 05:16) (96% - 96%)          09-29-22 @ 07:01  -  09-30-22 @ 07:00  --------------------------------------------------------  IN: 0 mL / OUT: 600 mL / NET: -600 mL            Consultant(s) Notes Reviewed:  [x ] YES  [ ] NO          MEDICATIONS  (STANDING):  acetylcysteine 10%  Inhalation 4 milliLiter(s) Inhalation daily  ALBUTerol    0.083% 2.5 milliGRAM(s) Nebulizer every 6 hours  aMIOdarone    Tablet 200 milliGRAM(s) Oral daily  aMIOdarone Infusion 1 mG/Min (33.3 mL/Hr) IV Continuous <Continuous>  aMIOdarone Infusion 0.5 mG/Min (16.7 mL/Hr) IV Continuous <Continuous>  apixaban 5 milliGRAM(s) Oral every 12 hours  atorvastatin 80 milliGRAM(s) Oral at bedtime  chlorhexidine 2% Cloths 1 Application(s) Topical <User Schedule>  metoprolol tartrate 100 milliGRAM(s) Oral two times a day  pantoprazole    Tablet 40 milliGRAM(s) Oral before breakfast  predniSONE   Tablet 50 milliGRAM(s) Oral once  sodium chloride 3%  Inhalation 4 milliLiter(s) Inhalation every 12 hours    MEDICATIONS  (PRN):  acetaminophen     Tablet .. 650 milliGRAM(s) Oral every 6 hours PRN Temp greater or equal to 38C (100.4F), Mild Pain (1 - 3)  meclizine 25 milliGRAM(s) Oral every 8 hours PRN Dizziness      LABS                          9.6    14.49 )-----------( 201      ( 29 Sep 2022 07:09 )             30.1     09-29    138  |  91<L>  |  50<H>  ----------------------------<  95  4.7   |  37<H>  |  1.0    Ca    8.3<L>      29 Sep 2022 07:09  Mg     2.3     09-29    TPro  5.5<L>  /  Alb  2.7<L>  /  TBili  0.6  /  DBili  x   /  AST  56<H>  /  ALT  52<H>  /  AlkPhos  85  09-29          Lactate Trend        CAPILLARY BLOOD GLUCOSE          Culture - Blood (collected 09-23-22 @ 23:30)  Source: .Blood Blood-Peripheral  Final Report (09-29-22 @ 07:01):    No Growth Final        RADIOLOGY & ADDITIONAL TESTS:    Imaging Personally Reviewed:  [ ] YES  [ ] NO    HEALTH ISSUES - PROBLEM Dx:          PHYSICAL EXAM:  GENERAL: NAD, well-developed. Hear hearing.   HEAD:  Atraumatic, Normocephalic.  EYES: EOMI, PERRLA, conjunctiva and sclera clear.  NECK: Supple, No JVD.  CHEST/LUNG: decrease breath sounds on left lower lung, pleurx cath in place.   HEART: Irregular rate and rhythm; S1 S2.   ABDOMEN: Soft, Nontender, Nondistended; Bowel sounds present.  EXTREMITIES:  2+ Peripheral Pulses, leg edema 1+  PSYCH: AAOx3.  NEUROLOGY: non-focal.  SKIN: No rashes or lesions.

## 2022-09-30 NOTE — DISCHARGE NOTE PROVIDER - NSDCFUSCHEDAPPT_GEN_ALL_CORE_FT
Kinjal Floyd  [facility]  CARDIOLOGY [address]  Scheduled Appointment: 10/03/2022    Keegan Middleton  [facility]  RADMED  [address]  Scheduled Appointment: 10/21/2022    [facility]  PULMMED [address]  Scheduled Appointment: 10/27/2022    Ilir Fletcher  [facility]  PULMMED [address]  Scheduled Appointment: 10/27/2022    Kinjal Floyd  [facility]  CARDIOLOGY [address]  Scheduled Appointment: 11/03/2022    Gennaro Arboleda  [facility]  VASCULAR [address]  Scheduled Appointment: 11/03/2022

## 2022-09-30 NOTE — DISCHARGE NOTE PROVIDER - CARE PROVIDER_API CALL
Ilir Fletcher)  Critical Care Medicine; Pulmonary Disease; Sleep Medicine  [address] [place] [address]  Phone: [phone]  Fax: [phone]  Follow Up Time: 2 weeks

## 2022-09-30 NOTE — PHYSICAL THERAPY INITIAL EVALUATION ADULT - ADDITIONAL COMMENTS
Pt is unreliable historian, unable to obtain PLOF or social history. Pt stated to resides with his parents at this time

## 2022-09-30 NOTE — DISCHARGE NOTE PROVIDER - NSDCMRMEDTOKEN_GEN_ALL_CORE_FT
acetaminophen 325 mg oral tablet: 2 tab(s) orally every 6 hours, As needed, Temp greater or equal to 38C (100.4F), Mild Pain (1 - 3)  amiodarone 200 mg oral tablet: 1 tab(s) orally once a day  atorvastatin 80 mg oral tablet: 1 tab(s) orally once a day  Eliquis Starter Pack for Treatment of DVT and PE 5 mg oral tablet: 1 tab(s) orally once a day please hold eliquis starting friday night  Lasix 40 mg oral tablet: 1 tab(s) orally once a day  meclizine 25 mg oral tablet, chewable: 1 tab(s) orally 3 times a day, As Needed  metoprolol tartrate 100 mg oral tablet: 1 tab(s) orally 2 times a day  pantoprazole 40 mg oral delayed release tablet: 1 tab(s) orally once a day (before a meal)

## 2022-09-30 NOTE — PROGRESS NOTE ADULT - ASSESSMENT
87 year old (ex-smoker), history in recently diagnosed NSCLC with large L pleural effusion s/p pleurodesis 8/26/2022 on 3L home o2, PAD, HTN, pAF on eliquis, s/p CEA brought in by son for worsening SOB and increasing oxygen requirements at home up to 5L, initially admitted to MICU, now in sdu      #Acute Hypoxic Hypercapnic Respiratory Failure - improving   #Large Left Pleural Effusion and Trace Right Pleural Effusion with History of Pleurodesis on 08/26/2022   # Non Small Cell Lung Cancer On Home O2 (3LPM NC)  #complete opacification of L thorax  #Suspected History of COPD in Setting of Smoking History  - s/p pleurx placement 9/23 with 2l removed 9/23, on home O2 requirements    - s/p Solu-Medrol IV currently on PO Prednisone taper dose.   - Procal 0.06 and blood cx negative monitor off antibiotics  - Respiratory status improved, although overall prognosis remains very poor  - possible hospice. Family requesting physical therapy facility prior to hospice facility  - 09/30: PleurX drained by bedside, 600cc of serosangunious fluid removed, patient tolerated, no complications.   - plan to resume AC     #Paroxysmal Atrial Fibrillation with RVR  #NSTEMI Likely Demand Ischemia  #HTN  - evaluated by EP  - increased metoprolol to [id number]  - s/p Amiodarone infusion, continue Amiodarone 400mg BID. Plan to switch to Amiodarone 200mg daily  - holding digoxin per EP   - Resume eliquis 5mg BID    #Mechanical Fall  - PT evaluation  - fall precautions     #Misc  - DVT Prophylaxis: elquis   - Diet: DASH, soft and bite sized   - Activity: IAt  - Code Status: DNR/DNI  - pending: dispo planning with case management    87 year old (ex-smoker), history in recently diagnosed NSCLC with large L pleural effusion s/p pleurodesis 8/26/2022 on 3L home o2, PAD, HTN, pAF on eliquis, s/p CEA brought in by son for worsening SOB and increasing oxygen requirements at home up to 5L, initially admitted to MICU, now in sdu      #Acute Hypoxic Hypercapnic Respiratory Failure - improving   #Large Left Pleural Effusion and Trace Right Pleural Effusion with History of Pleurodesis on 08/26/2022   # Non Small Cell Lung Cancer On Home O2 (3LPM NC)  #complete opacification of L thorax  #Suspected History of COPD in Setting of Smoking History  - s/p pleurx placement 9/23 with 2l removed 9/23, on home O2 requirements    - s/p Solu-Medrol IV currently on PO Prednisone taper dose.   - Procal 0.06 and blood cx negative monitor off antibiotics  - Respiratory status improved, although overall prognosis remains very poor  - possible hospice. Family requesting physical therapy facility prior to hospice facility  - 09/30: PleurX drained by bedside, 600cc of serosangunious fluid removed, patient tolerated, no complications. Plan to continue to drain 500cc fluid every other day upon DC.   - plan to resume AC     #Paroxysmal Atrial Fibrillation with RVR  #NSTEMI Likely Demand Ischemia  #HTN  - evaluated by EP  - increased metoprolol to [id number]  - s/p Amiodarone infusion, continue Amiodarone 400mg BID. Plan to switch to Amiodarone 200mg daily  - holding digoxin per EP   - Resume eliquis 5mg BID    #Mechanical Fall  - PT evaluation  - fall precautions     #Misc  - DVT Prophylaxis: elquis   - Diet: DASH, soft and bite sized   - Activity: IAt  - Code Status: DNR/DNI  - pending: dispo planning with case management

## 2022-09-30 NOTE — PROGRESS NOTE ADULT - ASSESSMENT
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   Hospice follow up. Fluid withdrawal from PlurX cath 500 cc 3 times a week.     Paroxysmal Atrial Fibrillation with Rapid Ventricular Response:   Elevated Troponin: NSTEMI Likely Due to Demand Ischemia Secondary to Afib with RVR  increased metoprolol to [id number]  s/p Amiodarone infusion, continue Amiodarone 400mg BID, switch to Amiodarone 200mg daily, Digoxin held  Eliquis on hold, can be resumed.     Mechanical Fall in ED  PT evaluation, fall precaution.     Elevated Troponin: NSTEMI Likely Due to Demand Ischemia Secondary to Afib with Rapid Ventricular Response  DNR/DNI    #Progress Note Handoff  Pending (specify): improving SOB, placement.   Family discussion: Plan of care discussed with patient's son Jack at bedside, all questions answered  Disposition: SNF in 24 hrs

## 2022-09-30 NOTE — PHYSICAL THERAPY INITIAL EVALUATION ADULT - PERTINENT HX OF CURRENT PROBLEM, REHAB EVAL
87 year old (ex-smoker), history in recently diagnosed NSCLC with large L pleural effusion s/p pleurodesis 8/26/2022 on 3L home o2, PAD, HTN, pAF on eliquis, s/p CEA brought in by son for worsening SOB and increasing oxygen requirements at home up to 5L, initially admitted to MICU, now in sdu

## 2022-09-30 NOTE — PROGRESS NOTE ADULT - SUBJECTIVE AND OBJECTIVE BOX
KEIKO SALDAÑA 87y Male  MRN#: [id number]   Hospital Day: 7d    SUBJECTIVE  Patient is a 87y old Male who presents with a chief complaint of Hypoxic Respiratory Failure (30 Sep 2022 07:27)  Currently admitted to medicine with the primary diagnosis of Pleural effusion      INTERVAL HPI AND OVERNIGHT EVENTS:  Patient was examined and seen at bedside. This morning he is resting comfortably in bed. No issues or overnight events.    OBJECTIVE  PAST MEDICAL & SURGICAL HISTORY  Vertigo    HTN (hypertension)    HLD (hyperlipidemia)    History of CEA (carotid endarterectomy)    S/P cataract surgery    H/O umbilical hernia repair      ALLERGIES:  No Known Allergies    MEDICATIONS:  STANDING MEDICATIONS  acetylcysteine 10%  Inhalation 4 milliLiter(s) Inhalation daily  ALBUTerol    0.083% 2.5 milliGRAM(s) Nebulizer every 6 hours  aMIOdarone    Tablet 400 milliGRAM(s) Oral every 12 hours  aMIOdarone Infusion 1 mG/Min IV Continuous <Continuous>  aMIOdarone Infusion 0.5 mG/Min IV Continuous <Continuous>  atorvastatin 80 milliGRAM(s) Oral at bedtime  chlorhexidine 2% Cloths 1 Application(s) Topical <User Schedule>  enoxaparin Injectable 40 milliGRAM(s) SubCutaneous every 24 hours  metoprolol tartrate 100 milliGRAM(s) Oral two times a day  pantoprazole    Tablet 40 milliGRAM(s) Oral before breakfast  predniSONE   Tablet 50 milliGRAM(s) Oral once  sodium chloride 3%  Inhalation 4 milliLiter(s) Inhalation every 12 hours    PRN MEDICATIONS  acetaminophen     Tablet .. 650 milliGRAM(s) Oral every 6 hours PRN  meclizine 25 milliGRAM(s) Oral every 8 hours PRN      VITAL SIGNS: Last 24 Hours  T(C): 36.9 (30 Sep 2022 05:16), Max: 36.9 (30 Sep 2022 05:16)  T(F): 98.4 (30 Sep 2022 05:16), Max: 98.4 (30 Sep 2022 05:16)  HR: 85 (30 Sep 2022 05:16) (85 - 110)  BP: 171/70 (30 Sep 2022 05:16) (97/69 - 171/70)  BP(mean): --  RR: 18 (30 Sep 2022 05:16) (18 - 18)  SpO2: 96% (30 Sep 2022 05:16) (96% - 96%)    LABS:                        9.6    14.49 )-----------( 201      ( 29 Sep 2022 07:09 )             30.1     09-29    138  |  91<L>  |  50<H>  ----------------------------<  95  4.7   |  37<H>  |  1.0    Ca    8.3<L>      29 Sep 2022 07:09  Mg     2.3     09-29    TPro  5.5<L>  /  Alb  2.7<L>  /  TBili  0.6  /  DBili  x   /  AST  56<H>  /  ALT  52<H>  /  AlkPhos  85  09-29                  RADIOLOGY:      PHYSICAL EXAM:  CONSTITUTIONAL: No acute distress, AAOx2, slightly disoriented at times  HEAD: Atraumatic, normocephalic  EYES: EOM intact, PERRLA, conjunctiva and sclera clear  ENT: Supple, no masses, no thyromegaly, no bruits, no JVD; moist mucous membranes  PULMONARY: Decrease BS on L side, no wheezes, rales, or rhonchi  CARDIOVASCULAR: Tahcycardic,  no murmurs, rubs, or gallops  GASTROINTESTINAL: Soft, non-tender, non-distended; bowel sounds present  MUSCULOSKELETAL: 2+ peripheral pulses; no clubbing, no cyanosis, +2 edema   NEUROLOGY: non-focal  SKIN: No rashes or lesions; warm and dry

## 2022-09-30 NOTE — DISCHARGE NOTE PROVIDER - NSDCCPCAREPLAN_GEN_ALL_CORE_FT
PRINCIPAL DISCHARGE DIAGNOSIS  Diagnosis: Pleural effusion  Assessment and Plan of Treatment: You were found to have recurrent pleural effusion causing shortness of breath. A pleurx catheter was placed. you will continue to get drained every other day. Follow up with pulmonology for removal.      SECONDARY DISCHARGE DIAGNOSES  Diagnosis: Atrial fibrillation  Assessment and Plan of Treatment: Atrial fibrillation is a type of irregular heartbeat (arrhythmia) where the heart quivers continuously in a chaotic pattern that makes the heart unable to pump blood normally. This can increase the risk for stroke, heart failure, and other heart-related conditions. Atrial fibrillation can be caused by a variety of conditions and may be temporary, intermittent, or permanent. Symptoms include feeling that your heart is beating rapidly or irregularly, chest discomfort, shortness of breath, or dizziness/lightheadedness that may be worse with exertion.  Your medicatinos were changed by your cardiac electrophysiologist. Please take medications as instructed.   SEEK IMMEDIATE MEDICAL CARE IF YOU HAVE ANY OF THE FOLLOWING SYMPTOMS: chest pain, shortness of breath, abdominal pain, sweating, vomiting, blood in vomit/bowel movements/urine, dizziness/lightheadedness, weakness or numbness to face/arm/leg, trouble speaking or understanding, facial droop.    Diagnosis: Acute respiratory failure with hypoxia  Assessment and Plan of Treatment:

## 2022-09-30 NOTE — PROGRESS NOTE ADULT - SUBJECTIVE AND OBJECTIVE BOX
Patient is a 87y old  Male who presents with a chief complaint of Hypoxic Respiratory Failure (29 Sep 2022 16:04)        Over Night Events:  on NC>  Off pressors.          ROS:     All ROS are negative except HPI         PHYSICAL EXAM    ICU Vital Signs Last 24 Hrs  T(C): 36.9 (30 Sep 2022 05:16), Max: 36.9 (30 Sep 2022 05:16)  T(F): 98.4 (30 Sep 2022 05:16), Max: 98.4 (30 Sep 2022 05:16)  HR: 85 (30 Sep 2022 05:16) (85 - 110)  BP: 171/70 (30 Sep 2022 05:16) (97/69 - 171/70)  BP(mean): --  ABP: --  ABP(mean): --  RR: 18 (30 Sep 2022 05:16) (18 - 18)  SpO2: 96% (30 Sep 2022 05:16) (96% - 96%)        CONSTITUTIONAL:  Ill appearing in  NAD    ENT:   Airway patent,   Mouth with normal mucosa.       EYES:   Pupils equal,   Round and reactive to light.    CARDIAC:   Normal rate,   Regular rhythm.      RESPIRATORY:   No wheezing  Absent BS Left lung   Normal chest expansion  Not tachypneic,  No use of accessory muscles    GASTROINTESTINAL:  Abdomen soft,   Non-tender,   No guarding,   + BS    MUSCULOSKELETAL:   Range of motion is not limited,  No clubbing, cyanosis    NEUROLOGICAL:   Alert   No motor  deficits.    SKIN:   Skin normal color for race,   No evidence of rash.    PSYCHIATRIC:   No apparent risk to self or others.        09-29-22 @ 07:01  -  09-30-22 @ 07:00  --------------------------------------------------------  IN:  Total IN: 0 mL    OUT:    Voided (mL): 600 mL  Total OUT: 600 mL    Total NET: -600 mL          LABS:                            9.6    14.49 )-----------( 201      ( 29 Sep 2022 07:09 )             30.1                                               09-29    138  |  91<L>  |  50<H>  ----------------------------<  95  4.7   |  37<H>  |  1.0    Ca    8.3<L>      29 Sep 2022 07:09  Mg     2.3     09-29    TPro  5.5<L>  /  Alb  2.7<L>  /  TBili  0.6  /  DBili  x   /  AST  56<H>  /  ALT  52<H>  /  AlkPhos  85  09-29                                                                                           LIVER FUNCTIONS - ( 29 Sep 2022 07:09 )  Alb: 2.7 g/dL / Pro: 5.5 g/dL / ALK PHOS: 85 U/L / ALT: 52 U/L / AST: 56 U/L / GGT: x                                                                                                                                       MEDICATIONS  (STANDING):  acetylcysteine 10%  Inhalation 4 milliLiter(s) Inhalation daily  ALBUTerol    0.083% 2.5 milliGRAM(s) Nebulizer every 6 hours  aMIOdarone    Tablet 400 milliGRAM(s) Oral every 12 hours  aMIOdarone Infusion 1 mG/Min (33.3 mL/Hr) IV Continuous <Continuous>  aMIOdarone Infusion 0.5 mG/Min (16.7 mL/Hr) IV Continuous <Continuous>  atorvastatin 80 milliGRAM(s) Oral at bedtime  chlorhexidine 2% Cloths 1 Application(s) Topical <User Schedule>  enoxaparin Injectable 40 milliGRAM(s) SubCutaneous every 24 hours  metoprolol tartrate 100 milliGRAM(s) Oral two times a day  pantoprazole    Tablet 40 milliGRAM(s) Oral before breakfast  predniSONE   Tablet 50 milliGRAM(s) Oral once  sodium chloride 3%  Inhalation 4 milliLiter(s) Inhalation every 12 hours    MEDICATIONS  (PRN):  acetaminophen     Tablet .. 650 milliGRAM(s) Oral every 6 hours PRN Temp greater or equal to 38C (100.4F), Mild Pain (1 - 3)  meclizine 25 milliGRAM(s) Oral every 8 hours PRN Dizziness      New X-rays reviewed:                                                                                  ECHO

## 2022-09-30 NOTE — PHYSICAL THERAPY INITIAL EVALUATION ADULT - GENERAL OBSERVATIONS, REHAB EVAL
13:00-13:25. chart reviewed. Pt received semi-mcnamara at B/S, confused, oriented X 1, able to follow one step instructions and agreeable to PT evaluation.  [place], + super ear, + O2 3 L via NC, + multiple skin breakdown with dressing. SOB, VSS.

## 2022-09-30 NOTE — PROGRESS NOTE ADULT - ASSESSMENT
IMPRESSION:    Acute on chronic hypoxic respiratory failure improved   Chronic hypercapnic respiratory failure  MARILEE NSCLC   Large left pleural effusion SP IPC.   Some bleeding at the exit site of the IPC resolved.    Complete atelectasis left lung   HO paroxysmal afib.  Now RVR   HO COPD not in acute exacerbation    PLAN:    CNS: Avoid CNS depressants.    HEENT: Oral care    PULMONARY:  HOB @ 45 degrees. Wean o2 as tolerated.  Encourage NIV use during sleep.  Pulmonary toilet.  IPC drainage today.  No need for steroids from pulmonary stand point     CARDIOVASCULAR:  C/w rate control. Avoid overload.     GI: GI prophylaxis.  Feeding    RENAL:  Follow up lytes.  Correct as needed    HEMATOLOGICAL:  DVT prophylaxis.  Monitor CBC and coags      ENDOCRINE:  Follow up FS.  Insulin protocol if needed.    MUSCULOSKELETAL:  OOB with PT OT     Prognosis overall poor

## 2022-09-30 NOTE — PHYSICAL THERAPY INITIAL EVALUATION ADULT - LEVEL OF INDEPENDENCE: SIT/SUPINE, REHAB EVAL
Pt participate in sitting at EOB, unable to maintain sitting position, poor trunk control,  SOB, + 3 L O2/dependent (less than 25% patients effort)

## 2022-09-30 NOTE — PROGRESS NOTE ADULT - REASON FOR ADMISSION
Hypoxic Respiratory Failure

## 2022-10-01 NOTE — DISCHARGE NOTE FOR THE EXPIRED PATIENT - HOSPITAL COURSE
Was transferred to the CEU for Acute Hypoxic Hypercapnic Respiratory Failure 2/2 Large Left Pleural Effusion and Trace Right Pleural Effusion.   Pt underwent a rapid response, It was found that he has left lung cavity was completely filled with pleural effusion despite 2L removal. patient went for pleurx placement 9/23 with 2l removed 9/23, on home O2 requirements  Was started on Solu-Medrol IV and was tapered on PO Prednisone. Procal 0.06 and blood cx negative monitor off antibiotics. Respiratory status improved, although overall prognosis remains very poor given Lung Cancer.   On 09/30: PleurX drained by bedside, 600cc of serosangunious fluid removed, patient tolerated, no complications. Plan to continue to drain 500cc fluid every other day upon DC.     For afib with RVR, patient was evaluated by EP. Digoxin was held. Started on amiodarone.  s/p Amiodarone infusion, continue Amiodarone 400mg BID. Plan to switch to Amiodarone 200mg daily. Additionally increased metoprolol to [id number]. Eliquis was previously held for thoracentesis but then resumed.     Patient was seen to be in asystole this morning on tele, death exam was done and patient pronounced at 7:29

## 2022-10-03 ENCOUNTER — APPOINTMENT (OUTPATIENT)
Dept: CARDIOLOGY | Facility: CLINIC | Age: 87
End: 2022-10-03

## 2022-10-12 DIAGNOSIS — J44.9 CHRONIC OBSTRUCTIVE PULMONARY DISEASE, UNSPECIFIED: ICD-10-CM

## 2022-10-12 DIAGNOSIS — J98.19 OTHER PULMONARY COLLAPSE: ICD-10-CM

## 2022-10-12 DIAGNOSIS — Z98.49 CATARACT EXTRACTION STATUS, UNSPECIFIED EYE: ICD-10-CM

## 2022-10-12 DIAGNOSIS — T85.838A HEMORRHAGE DUE TO OTHER INTERNAL PROSTHETIC DEVICES, IMPLANTS AND GRAFTS, INITIAL ENCOUNTER: ICD-10-CM

## 2022-10-12 DIAGNOSIS — R42 DIZZINESS AND GIDDINESS: ICD-10-CM

## 2022-10-12 DIAGNOSIS — Z66 DO NOT RESUSCITATE: ICD-10-CM

## 2022-10-12 DIAGNOSIS — Z87.891 PERSONAL HISTORY OF NICOTINE DEPENDENCE: ICD-10-CM

## 2022-10-12 DIAGNOSIS — J98.11 ATELECTASIS: ICD-10-CM

## 2022-10-12 DIAGNOSIS — J90 PLEURAL EFFUSION, NOT ELSEWHERE CLASSIFIED: ICD-10-CM

## 2022-10-12 DIAGNOSIS — I10 ESSENTIAL (PRIMARY) HYPERTENSION: ICD-10-CM

## 2022-10-12 DIAGNOSIS — Z99.81 DEPENDENCE ON SUPPLEMENTAL OXYGEN: ICD-10-CM

## 2022-10-12 DIAGNOSIS — I31.39 OTHER PERICARDIAL EFFUSION (NONINFLAMMATORY): ICD-10-CM

## 2022-10-12 DIAGNOSIS — C34.12 MALIGNANT NEOPLASM OF UPPER LOBE, LEFT BRONCHUS OR LUNG: ICD-10-CM

## 2022-10-12 DIAGNOSIS — I73.9 PERIPHERAL VASCULAR DISEASE, UNSPECIFIED: ICD-10-CM

## 2022-10-12 DIAGNOSIS — Z79.01 LONG TERM (CURRENT) USE OF ANTICOAGULANTS: ICD-10-CM

## 2022-10-12 DIAGNOSIS — Z78.1 PHYSICAL RESTRAINT STATUS: ICD-10-CM
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
## 2022-10-12 DIAGNOSIS — I95.9 HYPOTENSION, UNSPECIFIED: ICD-10-CM

## 2022-10-12 DIAGNOSIS — J96.22 ACUTE AND CHRONIC RESPIRATORY FAILURE WITH HYPERCAPNIA: ICD-10-CM

## 2022-10-12 DIAGNOSIS — Z20.822 CONTACT WITH AND (SUSPECTED) EXPOSURE TO COVID-19: ICD-10-CM

## 2022-10-12 DIAGNOSIS — R64 CACHEXIA: ICD-10-CM

## 2022-10-12 DIAGNOSIS — E78.5 HYPERLIPIDEMIA, UNSPECIFIED: ICD-10-CM

## 2022-10-12 DIAGNOSIS — J96.21 ACUTE AND CHRONIC RESPIRATORY FAILURE WITH HYPOXIA: ICD-10-CM
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
## 2022-10-12 DIAGNOSIS — I48.0 PAROXYSMAL ATRIAL FIBRILLATION: ICD-10-CM

## 2022-10-21 ENCOUNTER — APPOINTMENT (OUTPATIENT)
Dept: RADIATION ONCOLOGY | Facility: HOSPITAL | Age: 87
End: 2022-10-21

## 2022-10-27 ENCOUNTER — APPOINTMENT (OUTPATIENT)
Dept: PULMONOLOGY | Facility: CLINIC | Age: 87
End: 2022-10-27

## 2022-10-27 ENCOUNTER — APPOINTMENT (OUTPATIENT)
Age: 87
End: 2022-10-27

## 2022-11-03 ENCOUNTER — APPOINTMENT (OUTPATIENT)
Dept: CARDIOLOGY | Facility: CLINIC | Age: 87
End: 2022-11-03

## 2022-11-03 ENCOUNTER — APPOINTMENT (OUTPATIENT)
Dept: VASCULAR SURGERY | Facility: CLINIC | Age: 87
End: 2022-11-03

## 2022-12-23 NOTE — PATIENT PROFILE ADULT - PATIENT REPRESENTATIVE: ( YOU CAN CHOOSE ANY PERSON THAT CAN ASSIST YOU WITH YOUR HEALTH CARE PREFERENCES, DOES NOT HAVE TO BE A SPOUSE, IMMEDIATE FAMILY OR SIGNIFICANT OTHER/PARTNER)
Ok to establish?  Of note, have not seen  since 01/2019.  
Patient is established with Irena Mckeon.  Patients Surgeon is suggesting to establish with Dr Corbin.  Patients surgeons name is Dr Silva.  Located in [place].  Patients medical history is complicated.  Patients  is a patient of Dr Corbin along with other family members.  Patients  is asking to please take his wife as a patient.  Patients procedure has been cancelled due to blood work and multiple issues.    
Patient scheduled on 12/30 at 2pm.  
Please call pt and schedule a visit for her to see Dr. Corbin next week.  
Sure see me next week  
declines

## 2023-07-27 NOTE — ED PROVIDER NOTE - TOBACCO USE
Acute  -recommended patient avoid high fat foods  -will provide norco prescription  -refer to [facility] which has soonest availability  -pdmp checked, CS signed     
Unknown if ever smoked

## 2023-11-02 NOTE — ED ADULT TRIAGE NOTE - HEIGHT IN INCHES
Your surgery has been scheduled for:_______11/15/23___________________________________    You should report to:  ____[facility], located on the [place] side of the first floor of the           [facility] ([phone])  __X__[facility], located on the [facility] side of the            Second floor of the [facility] ([phone])  ____3rd Floor [facility] located on the [facility] side of the [facility] [phone]  ____[facility]: located at [address] [place] [address]. [address]     Please Note   Tell your doctor if you take Aspirin, products containing Aspirin, herbal medications  or blood thinners, such as Coumadin, Ticlid, or Plavix.  (Consult your provider regarding holding or stopping before surgery).  Arrange for someone to drive you home following surgery.  You will not be allowed to leave the surgical facility alone or drive yourself home following sedation and anesthesia.    Before Surgery  Stop taking all herbal medications, vitamins, and supplements 7 days prior to surgery  No Motrin/Advil (Ibuprofen) 7 days before surgery  No Aleve (Naproxen) 7 days before surgery  Stop Taking Asprin, products containing Aspirin __7___days before surgery   No Goody's/BC Powder 7 days before surgery  Refrain from drinking alcoholic beverages for 24 hours before and after surgery  Stop or limit smoking at least 24 hours prior to surgery  You may take Tylenol for pain    Night before Surgery  Do not eat or drink after midnight  Take a shower or bath (shower is recommended).  Bathe with Hibiclens soap or an antibacterial soap from the neck down.  If not supplied by your surgeon, hibiclens soap will need to be purchased over the counter in pharmacy.  Rinse soap off thoroughly.  Shampoo your hair with your regular shampoo    The Day of Surgery  Take another bath or shower with hibiclens  or any antibacterial soap, to reduce the chance of infection.  Take heart and blood pressure medications with a small sip of water, as advised by the perioperative team.  Do not take fluid pills  You may brush your teeth and rinse your mouth, but do not swallow any additional water.   Do not apply perfumes, powder, body lotions or deodorant on the day of surgery.  Nail polish should be removed.  Do not wear makeup or moisturizer  Wear comfortable clothes, such as a button front shirt and loose fitting pants.  Leave all jewelry, including body piercings, and valuables at home.    Bring any devices you will neeed after surgery such as crutches or canes.  If you have sleep apnea, please bring your CPAP machine  In the event that your physical condition changes including the onset of a cold or respiratory illness, or if you have to delay or cancel your surgery, please notify your surgeon.      7

## 2024-02-06 NOTE — DISCHARGE NOTE NURSING/CASE MANAGEMENT/SOCIAL WORK - HAS THE PATIENT USED TOBACCO IN THE PAST 30 DAYS?
Patient to ED via EMS from [facility]. Patient had a fall Saturday. Patient did hit his head, unknown LOC, patient not on blood thinners. Patient c/o headache 5/10 at this time.    No

## 2024-02-08 NOTE — DISCHARGE NOTE PROVIDER - DID THE PATIENT PRESENT WITH OR WAS TREATED FOR MALNUTRITION DURING THIS ADMISSION
Date: February 8, 2024  Time In: 2:30PM  Time Out: 3:00PM    Patient requested to complete 30 minutes session due to being a new job site and scheduled new appointment during session.    This provider is located at [place], [place] for [facility] ([facility] [address], [place] [address] using a secure [employer] Video Visit through [employer]. Patient is being seen remotely via telehealth at home address in [place] and stated they are in a secure environment for this session. The patient's condition being diagnosed/treated is appropriate for telemedicine. The provider identified herself as well as her credentials. The patient, and/or patients guardian, consent to be seen remotely, and when consent is given they understand that the consent allows for patient identifiable information to be sent to a third party as needed. They may refuse to be seen remotely at any time. The electronic data is encrypted and password protected, and the patient and/or guardian has been advised of the potential risks to privacy not withstanding such measures.     You have chosen to receive care through a telehealth visit.  Do you consent to use a video/audio connection for your medical care today? Yes    PROGRESS NOTE  Data:  Con Kirkpatrick is a 30 y.o. male who presents today for follow up    Chief Complaint: Depression and Anxiety    History of Present Illness: Patient reports fluctuating anxiety and depression through the week. Patient reports missing several appointments with his primary provider and was referred back see doctor. Patient reports having some difficulties with connection and password getting into session. Patient reports trying to help current girlfriend get treatment for substance use which has been difficult as well as her entering several programs. Patient reports she has been dealing Patient reports work slowed down during the winter and he had to  switch to another job in construction. Patient reports some financial stress due to limited hours when working with uncle and having his son staying with him more often.  Patient reports current medications are working well to reduce symptoms and Vraylar has helped stabilize mood.  Patient reports working towards maintaining medication adherence, maintaining stable employment, and finding ways to increase positive social activities.      Clinical Maneuvering/Intervention: CBT, MI, Solution focused    (Scales based on 0 - 10 with 10 being the worst)  Depression: 3 Anxiety: 3       Assisted patient in processing above session content; acknowledged and normalized patient’s thoughts, feelings, and concerns.  Rationalized patient thought process regarding recent stressors and life events. Discussed triggers associated with patient's emotions. Utilized CBT to process through and correct irrational cognitions with emphasis on  impacts of substance use on mental health . Also discussed coping skills for patient to implement. Discussed recently taking girlfriend to multiple treatment centers and attempting to remain in a positive place and not returning to use himself.  Processed through thoughts and emotions surrounding relationship with son's mother, having to change employers due to decreased work, and recent appointments missed with provider.  Discussed working towards short-term goal setting, maintaining employment, limiting interactions with individuals using, engaging in positive social activities, and increasing self-care.    Allowed patient to freely discuss issues without interruption or judgment. Provided safe, confidential environment to facilitate the development of positive therapeutic relationship and encourage open, honest communication. Assisted patient in identifying risk factors which would indicate the need for higher level of care including thoughts to harm self or others and/or self-harming behavior and  encouraged patient to contact this office, call 911, or present to the nearest emergency room should any of these events occur. Discussed crisis intervention services and means to access. Patient adamantly and convincingly denies current suicidal or homicidal ideation or perceptual disturbance.    Assessment:   Assessment   Patient appears to maintain relative stability as compared to their baseline.  However, patient continues to struggle with depression and anxiety, MAINE which continues to cause impairment in important areas of functioning.  A result, they can be reasonably expected to continue to benefit from treatment and would likely be at increased risk for decompensation otherwise.    Mental Status Exam:   Hygiene:   good  Cooperation:  Cooperative  Eye Contact:  Good  Psychomotor Behavior:  Appropriate  Affect:  Full range  Mood: fluctates  Speech:  Normal  Thought Process:  Linear  Thought Content:  Mood congruent  Suicidal:  None  Homicidal:  None  Hallucinations:  None  Delusion:  None  Memory:  Intact  Orientation:  Person, Place, Time and Situation  Reliability:  fair  Insight:  Fair  Judgement:  Fair  Impulse Control:  Fair  Physical/Medical Issues:  No        Patient's Support Network Includes:  extended family    Functional Status: Mild impairment     Progress toward goal: Patient is working towards practicing the ABC's of CBT model while at home to process through and correct or improve cognitions. Patient is making progress on processing thoughts and emotions during sessions, utilizing positive thinking strategies, daily positive affirmations and self care practices.     Prognosis: Good with Ongoing Treatment            Plan:    Patient will continue in individual outpatient therapy with focus on improved functioning and coping skills, maintaining stability, and avoiding decompensation and the need for higher level of care.    Patient will adhere to medication regimen as prescribed and report any  side effects. Patient will contact this office, call 911 or present to the nearest emergency room should suicidal or homicidal ideations occur. Provide Cognitive Behavioral Therapy and Solution Focused Therapy to improve functioning, maintain stability, and avoid decompensation and the need for higher level of care.     Return in about 2 weeks, or earlier if symptoms worsen or fail to improve.           VISIT DIAGNOSIS:     ICD-10-CM ICD-9-CM   1. Major depressive disorder, recurrent episode, moderate  F33.1 296.32   2. Generalized anxiety disorder  F41.1 300.02        Diagnoses and all orders for this visit:    1. Major depressive disorder, recurrent episode, moderate (Primary)    2. Generalized anxiety disorder           [facility] No Show Policy:  We understand unexpected circumstances arise; however, anytime you miss your appointment we are unable to provide you appropriate care.  In addition, each appointment missed could have been used to provide care for others.  We ask that you call at least 24 hours in advance to cancel or reschedule an appointment.  We would like to take this opportunity to remind you of our policy stating patients who miss THREE or more appointments without cancelling or rescheduling 24 hours in advance of the appointment may be subject to cancellation of any further visits with our clinic and recommendation to seek in-person services/visits.    Please call [phone] to reschedule your appointment. If there are reasons that make it difficult for you to keep the appointments, please call and let us know how we can help.  Please understand that medication prescribing will not continue without seeing your provider.      [facility]'s No Show Policy reviewed with patient at today's visit. Patient verbalized understanding of this policy. Discussed with patient that in the event that there are three or more no show visits, it will be recommended  that they pursue in-person services/visits as noncompliance with telehealth visits indicates that patient is not an appropriate candidate for telemedicine and would likely be more appropriate for in-person services/visits. Patient verbalizes understanding and is agreeable to this.       This document has been electronically signed by Rupert Wright III, LCSW  February 8, 2024 14:30 EST      Part of this note may be an electronic transcription/translation of spoken language to printed text using the Dragon Dictation System.         Yes...

## 2025-06-16 NOTE — PROGRESS NOTE ADULT - ASSESSMENT
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